# Patient Record
Sex: FEMALE | Race: WHITE | Employment: OTHER | URBAN - METROPOLITAN AREA
[De-identification: names, ages, dates, MRNs, and addresses within clinical notes are randomized per-mention and may not be internally consistent; named-entity substitution may affect disease eponyms.]

---

## 2018-03-28 ENCOUNTER — OFFICE VISIT (OUTPATIENT)
Dept: VASCULAR SURGERY | Age: 83
End: 2018-03-28
Payer: MEDICARE

## 2018-03-28 VITALS — SYSTOLIC BLOOD PRESSURE: 122 MMHG | HEART RATE: 76 BPM | DIASTOLIC BLOOD PRESSURE: 76 MMHG

## 2018-03-28 DIAGNOSIS — I83.93 VARICOSE VEINS OF BOTH LOWER EXTREMITIES: Chronic | ICD-10-CM

## 2018-03-28 DIAGNOSIS — I87.2 VENOUS INSUFFICIENCY OF BOTH LOWER EXTREMITIES: Chronic | ICD-10-CM

## 2018-03-28 PROCEDURE — 99213 OFFICE O/P EST LOW 20 MIN: CPT | Performed by: SURGERY

## 2018-04-16 PROBLEM — I83.93 VARICOSE VEINS OF BOTH LOWER EXTREMITIES: Chronic | Status: ACTIVE | Noted: 2018-04-16

## 2018-06-13 PROBLEM — I83.93 VARICOSE VEINS OF BOTH LOWER EXTREMITIES: Chronic | Status: ACTIVE | Noted: 2018-06-13

## 2018-06-13 PROBLEM — I87.2 VENOUS INSUFFICIENCY OF BOTH LOWER EXTREMITIES: Chronic | Status: ACTIVE | Noted: 2018-06-13

## 2018-06-21 ENCOUNTER — OFFICE VISIT (OUTPATIENT)
Dept: CARDIOLOGY CLINIC | Age: 83
End: 2018-06-21
Payer: MEDICARE

## 2018-06-21 VITALS
RESPIRATION RATE: 16 BRPM | WEIGHT: 152.2 LBS | DIASTOLIC BLOOD PRESSURE: 58 MMHG | BODY MASS INDEX: 25.36 KG/M2 | HEIGHT: 65 IN | SYSTOLIC BLOOD PRESSURE: 134 MMHG | HEART RATE: 63 BPM

## 2018-06-21 DIAGNOSIS — R00.2 PALPITATION: Primary | ICD-10-CM

## 2018-06-21 PROCEDURE — 93000 ELECTROCARDIOGRAM COMPLETE: CPT | Performed by: INTERNAL MEDICINE

## 2018-06-21 PROCEDURE — 99213 OFFICE O/P EST LOW 20 MIN: CPT | Performed by: INTERNAL MEDICINE

## 2018-06-21 RX ORDER — METOPROLOL SUCCINATE 50 MG/1
50 TABLET, EXTENDED RELEASE ORAL 2 TIMES DAILY
Qty: 180 TABLET | Refills: 3 | Status: SHIPPED | OUTPATIENT
Start: 2018-06-21 | End: 2019-05-28 | Stop reason: SDUPTHER

## 2018-06-21 RX ORDER — ACETAMINOPHEN AND CODEINE PHOSPHATE 300; 30 MG/1; MG/1
TABLET ORAL
COMMUNITY
Start: 2018-06-01 | End: 2018-07-31

## 2018-07-03 ENCOUNTER — TELEPHONE (OUTPATIENT)
Dept: VASCULAR SURGERY | Age: 83
End: 2018-07-03

## 2018-07-31 NOTE — PROGRESS NOTES
Wendy 38 PRE-ADMISSION TESTING GENERAL INSTRUCTIONS- [facility]-phone number:[phone]    GENERAL INSTRUCTIONS  [x] Antibacterial Soap shower Night before and/or AM of Surgery  [] Rei wipe instruction sheet and wipes given. [x] Nothing by mouth after midnight, including gum, candy, mints, or water. [x] You may brush your teeth, gargle, but do NOT swallow water. []Hibiclens shower  the night before and the morning of surgery. Do not use             Hibiclens on your face or head. [x]No smoking, chewing tobacco, illegal drugs, or alcohol within 24 hours of your surgery. [x] Jewelry, valuables or body piercing's should not be brought to the hospital. All body and/or tongue piercing's must be removed prior to arriving to hospital.  ALL hair pins must be removed. [x] Do not wear makeup, lotions, powders, deodorant. Nail polish as directed by the nurse. [x] Arrange transportation to and from the hospital.  Arrange for someone to be with you for the remainder of the day and for 24 hours after your procedure due to having had anesthesia. [x] Bring insurance card and photo ID.  [] Transfusion Bracelet: Please bring with you to hospital, day of surgery  [] Bring urine specimen day of surgery. Any small container is acceptable. [] Use inhalers the morning of surgery and bring with you to hospital.   []Bring copy of living will or healthcare power of  papers to be placed in your electronic record. [] CPAP/BI-PAP: Please bring your machine if you are to spend the night in the hospital.     ENDOSCOPY INSTRUCTIONS:   [] Bowel prep instructions reviewed. [] Nothing by mouth after midnight, including gum, candy, mints, or water.  [] You may brush your teeth, gargle, but do NOT swallow water. [] Do not wear makeup, lotions, powders, deodorant. Nail polish as directed by the nurse.   [] Arrange transportation to and from the hospital.  Arrange for someone to be with you for the

## 2018-08-10 ENCOUNTER — ANESTHESIA (OUTPATIENT)
Dept: OPERATING ROOM | Age: 83
End: 2018-08-10
Payer: MEDICARE

## 2018-08-10 ENCOUNTER — ANESTHESIA EVENT (OUTPATIENT)
Dept: OPERATING ROOM | Age: 83
End: 2018-08-10
Payer: MEDICARE

## 2018-08-10 ENCOUNTER — HOSPITAL ENCOUNTER (OUTPATIENT)
Age: 83
Setting detail: OUTPATIENT SURGERY
Discharge: HOME | End: 2018-08-10
Attending: SURGERY | Admitting: SURGERY
Payer: MEDICARE

## 2018-08-10 VITALS
RESPIRATION RATE: 16 BRPM | BODY MASS INDEX: 26.66 KG/M2 | TEMPERATURE: 98.1 F | SYSTOLIC BLOOD PRESSURE: 170 MMHG | HEART RATE: 65 BPM | OXYGEN SATURATION: 98 % | WEIGHT: 160 LBS | DIASTOLIC BLOOD PRESSURE: 80 MMHG | HEIGHT: 65 IN

## 2018-08-10 VITALS — SYSTOLIC BLOOD PRESSURE: 111 MMHG | OXYGEN SATURATION: 96 % | DIASTOLIC BLOOD PRESSURE: 55 MMHG

## 2018-08-10 DIAGNOSIS — G89.18 POST-OP PAIN: ICD-10-CM

## 2018-08-10 DIAGNOSIS — I83.93 VARICOSE VEINS OF BOTH LOWER EXTREMITIES: Primary | Chronic | ICD-10-CM

## 2018-08-10 LAB
ANION GAP SERPL CALCULATED.3IONS-SCNC: 11 MMOL/L (ref 7–16)
BUN BLDV-MCNC: 19 MG/DL (ref 8–23)
CALCIUM SERPL-MCNC: 9.4 MG/DL (ref 8.6–10.2)
CHLORIDE BLD-SCNC: 99 MMOL/L (ref 98–107)
CO2: 25 MMOL/L (ref 22–29)
CREAT SERPL-MCNC: 0.8 MG/DL (ref 0.5–1)
GFR AFRICAN AMERICAN: >60
GFR NON-AFRICAN AMERICAN: >60 ML/MIN/1.73
GLUCOSE BLD-MCNC: 119 MG/DL (ref 74–109)
HCT VFR BLD CALC: 43.8 % (ref 34–48)
HEMOGLOBIN: 15.3 G/DL (ref 11.5–15.5)
INR BLD: 1
MCH RBC QN AUTO: 31 PG (ref 26–35)
MCHC RBC AUTO-ENTMCNC: 34.9 % (ref 32–34.5)
MCV RBC AUTO: 88.7 FL (ref 80–99.9)
PDW BLD-RTO: 13.7 FL (ref 11.5–15)
PLATELET # BLD: 219 E9/L (ref 130–450)
PMV BLD AUTO: 10.3 FL (ref 7–12)
POTASSIUM REFLEX MAGNESIUM: 4.2 MMOL/L (ref 3.5–5)
PROTHROMBIN TIME: 11.4 SEC (ref 9.3–12.4)
RBC # BLD: 4.94 E12/L (ref 3.5–5.5)
SODIUM BLD-SCNC: 135 MMOL/L (ref 132–146)
WBC # BLD: 10 E9/L (ref 4.5–11.5)

## 2018-08-10 PROCEDURE — 6360000002 HC RX W HCPCS

## 2018-08-10 PROCEDURE — 3700000001 HC ADD 15 MINUTES (ANESTHESIA): Performed by: SURGERY

## 2018-08-10 PROCEDURE — 3700000000 HC ANESTHESIA ATTENDED CARE: Performed by: SURGERY

## 2018-08-10 PROCEDURE — 2709999900 HC NON-CHARGEABLE SUPPLY: Performed by: SURGERY

## 2018-08-10 PROCEDURE — 6360000002 HC RX W HCPCS: Performed by: SURGERY

## 2018-08-10 PROCEDURE — 7100000011 HC PHASE II RECOVERY - ADDTL 15 MIN: Performed by: SURGERY

## 2018-08-10 PROCEDURE — 2580000003 HC RX 258: Performed by: SURGERY

## 2018-08-10 PROCEDURE — C1894 INTRO/SHEATH, NON-LASER: HCPCS | Performed by: SURGERY

## 2018-08-10 PROCEDURE — 7100000010 HC PHASE II RECOVERY - FIRST 15 MIN: Performed by: SURGERY

## 2018-08-10 PROCEDURE — 3600000012 HC SURGERY LEVEL 2 ADDTL 15MIN: Performed by: SURGERY

## 2018-08-10 PROCEDURE — 2580000003 HC RX 258

## 2018-08-10 PROCEDURE — 80048 BASIC METABOLIC PNL TOTAL CA: CPT

## 2018-08-10 PROCEDURE — 85610 PROTHROMBIN TIME: CPT

## 2018-08-10 PROCEDURE — 88304 TISSUE EXAM BY PATHOLOGIST: CPT

## 2018-08-10 PROCEDURE — 37765 STAB PHLEB VEINS XTR 10-20: CPT | Performed by: SURGERY

## 2018-08-10 PROCEDURE — 85027 COMPLETE CBC AUTOMATED: CPT

## 2018-08-10 PROCEDURE — 2500000003 HC RX 250 WO HCPCS

## 2018-08-10 PROCEDURE — 3600000002 HC SURGERY LEVEL 2 BASE: Performed by: SURGERY

## 2018-08-10 PROCEDURE — 36415 COLL VENOUS BLD VENIPUNCTURE: CPT

## 2018-08-10 RX ORDER — OXYCODONE HYDROCHLORIDE AND ACETAMINOPHEN 5; 325 MG/1; MG/1
1 TABLET ORAL EVERY 6 HOURS PRN
Qty: 12 TABLET | Refills: 0 | Status: SHIPPED | OUTPATIENT
Start: 2018-08-10 | End: 2018-08-15

## 2018-08-10 RX ORDER — SODIUM CHLORIDE 9 MG/ML
INJECTION, SOLUTION INTRAVENOUS CONTINUOUS PRN
Status: DISCONTINUED | OUTPATIENT
Start: 2018-08-10 | End: 2018-08-10 | Stop reason: SDUPTHER

## 2018-08-10 RX ORDER — PROPOFOL 10 MG/ML
INJECTION, EMULSION INTRAVENOUS PRN
Status: DISCONTINUED | OUTPATIENT
Start: 2018-08-10 | End: 2018-08-10 | Stop reason: SDUPTHER

## 2018-08-10 RX ORDER — SODIUM CHLORIDE 0.9 % (FLUSH) 0.9 %
10 SYRINGE (ML) INJECTION EVERY 12 HOURS SCHEDULED
Status: DISCONTINUED | OUTPATIENT
Start: 2018-08-10 | End: 2018-08-10 | Stop reason: HOSPADM

## 2018-08-10 RX ORDER — PROPOFOL 10 MG/ML
INJECTION, EMULSION INTRAVENOUS CONTINUOUS PRN
Status: DISCONTINUED | OUTPATIENT
Start: 2018-08-10 | End: 2018-08-10 | Stop reason: SDUPTHER

## 2018-08-10 RX ORDER — FENTANYL CITRATE 50 UG/ML
INJECTION, SOLUTION INTRAMUSCULAR; INTRAVENOUS PRN
Status: DISCONTINUED | OUTPATIENT
Start: 2018-08-10 | End: 2018-08-10 | Stop reason: SDUPTHER

## 2018-08-10 RX ORDER — MIDAZOLAM HYDROCHLORIDE 1 MG/ML
INJECTION INTRAMUSCULAR; INTRAVENOUS PRN
Status: DISCONTINUED | OUTPATIENT
Start: 2018-08-10 | End: 2018-08-10 | Stop reason: SDUPTHER

## 2018-08-10 RX ORDER — SODIUM CHLORIDE 0.9 % (FLUSH) 0.9 %
10 SYRINGE (ML) INJECTION PRN
Status: DISCONTINUED | OUTPATIENT
Start: 2018-08-10 | End: 2018-08-10 | Stop reason: HOSPADM

## 2018-08-10 RX ADMIN — MIDAZOLAM HYDROCHLORIDE 1 MG: 1 INJECTION, SOLUTION INTRAMUSCULAR; INTRAVENOUS at 07:32

## 2018-08-10 RX ADMIN — PROPOFOL 30 MG: 10 INJECTION, EMULSION INTRAVENOUS at 07:50

## 2018-08-10 RX ADMIN — Medication 2 G: at 07:47

## 2018-08-10 RX ADMIN — PROPOFOL 40 MG: 10 INJECTION, EMULSION INTRAVENOUS at 07:35

## 2018-08-10 RX ADMIN — SODIUM CHLORIDE: 9 INJECTION, SOLUTION INTRAVENOUS at 07:32

## 2018-08-10 RX ADMIN — FENTANYL CITRATE 50 MCG: 50 INJECTION, SOLUTION INTRAMUSCULAR; INTRAVENOUS at 07:40

## 2018-08-10 RX ADMIN — PROPOFOL 50 MCG/KG/MIN: 10 INJECTION, EMULSION INTRAVENOUS at 07:30

## 2018-08-10 RX ADMIN — FENTANYL CITRATE 25 MCG: 50 INJECTION, SOLUTION INTRAMUSCULAR; INTRAVENOUS at 07:55

## 2018-08-10 ASSESSMENT — PULMONARY FUNCTION TESTS
PIF_VALUE: 0
PIF_VALUE: 1
PIF_VALUE: 1
PIF_VALUE: 0
PIF_VALUE: 1
PIF_VALUE: 0
PIF_VALUE: 1
PIF_VALUE: 0

## 2018-08-10 ASSESSMENT — PAIN - FUNCTIONAL ASSESSMENT: PAIN_FUNCTIONAL_ASSESSMENT: 0-10

## 2018-08-10 NOTE — ANESTHESIA PRE PROCEDURE
Department of Anesthesiology  Preprocedure Note       Name:  Sharran Severs   Age:  82 y.o.  :  1935                                          MRN:  [id number]         Date:  8/10/2018      Surgeon: Katina Walls):  Jenn Hu MD    Procedure: Procedure(s):  RIGHT GREASTER SAPHENOUS VEIN ABLATION RADIOFREQUENCY  WITH STAB PHLEBECTOMIES    Medications prior to admission:   Prior to Admission medications    Medication Sig Start Date End Date Taking? Authorizing Provider   metoprolol succinate (TOPROL XL) 50 MG extended release tablet Take 1 tablet by mouth 2 times daily 18  Yes Jerry Bee MD   calcitonin (MIACALCIN) 200 UNIT/ACT nasal spray 1 spray by Nasal route daily  16  Yes Historical Provider, MD   levothyroxine (SYNTHROID) 75 MCG tablet Take 75 mcg by mouth Daily    Yes Historical Provider, MD   amitriptyline (ELAVIL) 50 MG tablet Take 25 mg by mouth nightly    Yes Historical Provider, MD   Calcium-Magnesium-Vitamin D (CITRACAL CALCIUM+D PO) Take 500 mg by mouth 2 times daily. Historical Provider, MD   vitamin D (CHOLECALCIFEROL) 1000 UNIT TABS tablet Take 1,000 Int'l Units by mouth daily Takes two tabs daily    Historical Provider, MD       Current medications:    Current Facility-Administered Medications   Medication Dose Route Frequency Provider Last Rate Last Dose    sodium chloride flush 0.9 % injection 10 mL  10 mL Intravenous 2 times per day Jenn Hu MD        sodium chloride flush 0.9 % injection 10 mL  10 mL Intravenous PRN Jenn Hu MD        ceFAZolin (ANCEF) 2 g in dextrose 5 % 50 mL IVPB  2 g Intravenous On Call to [address], MD           Allergies:     Allergies   Allergen Reactions    Iv Dye [Iodides] Hives    Pcn [Penicillins] Rash    Aspirin Other (See Comments)     tingling    Bactrim Other (See Comments)     Yeast infection    Drixoral [Dexbrompheniramine-Pseudoeph] Other (See Comments)     hyper    Morphine Other (See 3 Encounters:   08/10/18 160 lb (72.6 kg)   06/21/18 152 lb 3.2 oz (69 kg)   06/08/17 171 lb 12.8 oz (77.9 kg)     Body mass index is 26.63 kg/m². CBC:   Lab Results   Component Value Date    WBC 10.0 08/10/2018    RBC 4.94 08/10/2018    HGB 15.3 08/10/2018    HCT 43.8 08/10/2018    MCV 88.7 08/10/2018    RDW 13.7 08/10/2018     08/10/2018       CMP:   Lab Results   Component Value Date     08/10/2018    K 4.2 08/10/2018    CL 99 08/10/2018    CO2 25 08/10/2018    BUN 19 08/10/2018    CREATININE 0.8 08/10/2018    GFRAA >60 08/10/2018    LABGLOM >60 08/10/2018    GLUCOSE 119 08/10/2018    GLUCOSE 82 05/15/2012    PROT 6.1 05/15/2012    CALCIUM 9.4 08/10/2018    BILITOT 0.4 05/15/2012    ALKPHOS 97 05/15/2012    AST 19 05/15/2012    ALT 14 05/15/2012       POC Tests: No results for input(s): POCGLU, POCNA, POCK, POCCL, POCBUN, POCHEMO, POCHCT in the last 72 hours. Coags:   Lab Results   Component Value Date    PROTIME 11.4 08/10/2018    INR 1.0 08/10/2018    APTT 32.3 02/07/2014       HCG (If Applicable): No results found for: PREGTESTUR, PREGSERUM, HCG, HCGQUANT     ABGs: No results found for: PHART, PO2ART, NMT8NKO, DKD0STA, BEART, P8RTJTXB     Type & Screen (If Applicable):  No results found for: LABABO, [address]    Anesthesia Evaluation  Patient summary reviewed   history of anesthetic complications: difficult airway.   Airway: Mallampati: II  TM distance: >3 FB   Neck ROM: full  Mouth opening: > = 3 FB Dental: normal exam         Pulmonary:Negative Pulmonary ROS breath sounds clear to auscultation                             Cardiovascular:    (+) hypertension:,         Rhythm: regular  Rate: normal                    Neuro/Psych:   (+) headaches:,             GI/Hepatic/Renal: Neg GI/Hepatic/Renal ROS            Endo/Other:    (+) hypothyroidism::., .                 Abdominal:           Vascular:                                        Anesthesia Plan      MAC     ASA 3       Induction:

## 2018-08-10 NOTE — PROGRESS NOTES
Pt admitted to same day surgery. Pt alert/oriented x3. Respirations non-labored. Skin warm/dry. No distress noted. Side rails x2. Call light in reach. Will continue to monitor.   David Gonzalez RN

## 2018-08-10 NOTE — H&P
extraocular eye movements intact, conjunctivae normal  ENT: external ear and ear canal normal bilaterally, nose without deformity  Pulmonary/Chest: clear to auscultation bilaterally- no wheezes, rales or rhonchi, normal air movement, no respiratory distress  Cardiovascular: normal rate, regular rhythm, normal S1 and S2, no murmurs, no carotid bruits  Abdomen: soft, non-tender, non-distended, normal bowel sounds, no masses or organomegaly  Musculoskeletal: normal range of motion, no joint swelling, deformity or tenderness  Neurologic: no cranial nerve deficit, gait, coordination and speech normal  Extremities: Positive bilateral lower extremity varicose veins. She has a palpable old thrombus there is no signs of superficial cellulitis or significant pain or discomfort on palpation.     PULSE EXAM      Right      Left   Brachial 3 3   Radial 3 3   Femoral       Popliteal 3 3   Dorsalis Pedis 2 2   Posterior Tibial 2 2   (3=normal, 2=diminished, 1=barely palpable, 4=widened)     RADIOLOGY:          Problem List Items Addressed This Visit      Venous since (Chronic)     Venous insufficiency of both lower extremities (Chronic)             #1 bilateral lower extremity saphenofemoral junction reflux. At this point she has a thrombosed superficial varicose vein in the mid calf on the right side. She has no evidence of deep venous thrombosis. Both legs cause her some pain and discomfort of which she has responded nicely to compression therapy. At this point I think secondary to her varicose vein/thrombus of the varicose vein and the reflux of the saphenofemoral junction I think it is reasonable to proceed with radiofrequency ablation and phlebectomy if needed.     We will proceed. I discussed this case with the patient at length.  Risk benefits and alternatives including bleeding, infection, arterial venous nerve injury, DVT, pulmonary embolus, recurrence, pain or swelling, some sensory or motor disturbance, limb loss and her

## 2018-08-10 NOTE — PROGRESS NOTES
tumescent custom solution of 50 ml of lidocaine 1% with epi  In 500 ml og 0.9% saline given to field none used and was wasted

## 2018-08-10 NOTE — PROGRESS NOTES
Pt admitted to secondary recovery. Pt alert/oriented x3. Respirations non-labored. Skin warm/dry. No distress noted. Side rails x2. Call light in reach. Will continue to monitor.   Ilene Law, RN

## 2018-08-15 ENCOUNTER — HOSPITAL ENCOUNTER (OUTPATIENT)
Dept: CARDIOLOGY | Age: 83
Discharge: HOME | End: 2018-08-15
Payer: MEDICARE

## 2018-08-15 ENCOUNTER — OFFICE VISIT (OUTPATIENT)
Dept: VASCULAR SURGERY | Age: 83
End: 2018-08-15

## 2018-08-15 DIAGNOSIS — I87.2 VENOUS INSUFFICIENCY OF BOTH LOWER EXTREMITIES: Primary | ICD-10-CM

## 2018-08-15 PROCEDURE — 99024 POSTOP FOLLOW-UP VISIT: CPT | Performed by: NURSE PRACTITIONER

## 2018-08-15 PROCEDURE — 93971 EXTREMITY STUDY: CPT

## 2018-08-15 NOTE — PROGRESS NOTES
[facility] Heart [facility] Vascular Lab - [facility]    This is a pre read worksheet - prior to official physician interpretationFrances Janee Pitt    1935  Date of study: 8/15/18    Indication for study:  Leg pain and swelling, varicose veins  Study : Right Lower Extremity Venous Duplex Examination    Duplex examination of the common, deep, superficial femoral, and the popliteal veins of the RIGHT lower extremity identifies spontaneous flow. All scanned veins are compressible and free of echogenic densities. Additional comments: negative DVT. Flow visualized in gsv ( 0.4 cm x 0.4 cm) to mid- thigh.

## 2018-09-28 ENCOUNTER — TELEPHONE (OUTPATIENT)
Dept: VASCULAR SURGERY | Age: 83
End: 2018-09-28

## 2018-09-28 NOTE — TELEPHONE ENCOUNTER
Patient instructed to continue wearing compression stockings, as sclerotherapy not covered under her insurance for the left leg.

## 2019-05-28 RX ORDER — METOPROLOL SUCCINATE 50 MG/1
50 TABLET, EXTENDED RELEASE ORAL 2 TIMES DAILY
Qty: 180 TABLET | Refills: 3 | Status: SHIPPED
Start: 2019-05-28 | End: 2020-04-06 | Stop reason: SDUPTHER

## 2019-05-28 NOTE — TELEPHONE ENCOUNTER
Patient called and needs a refill on metoprolol succinate (TOPROL XL) 50 MG extended release tablet sent to [facility].

## 2019-07-03 ENCOUNTER — OFFICE VISIT (OUTPATIENT)
Dept: CARDIOLOGY CLINIC | Age: 84
End: 2019-07-03
Payer: MEDICARE

## 2019-07-03 VITALS
BODY MASS INDEX: 27.64 KG/M2 | WEIGHT: 165.9 LBS | RESPIRATION RATE: 16 BRPM | HEART RATE: 65 BPM | HEIGHT: 65 IN | DIASTOLIC BLOOD PRESSURE: 72 MMHG | SYSTOLIC BLOOD PRESSURE: 120 MMHG

## 2019-07-03 DIAGNOSIS — R00.2 PALPITATION: Primary | ICD-10-CM

## 2019-07-03 PROCEDURE — 99213 OFFICE O/P EST LOW 20 MIN: CPT | Performed by: INTERNAL MEDICINE

## 2019-07-03 PROCEDURE — 93000 ELECTROCARDIOGRAM COMPLETE: CPT | Performed by: INTERNAL MEDICINE

## 2019-07-03 NOTE — PROGRESS NOTES
OUTPATIENT CARDIOLOGY FOLLOW-UP    Name: Augustine Cisneros    Age: 82 y.o. Primary Care Physician: Myesha Charles MD    Date of Service: 7/3/2019    Chief Complaint:   Chief Complaint   Patient presents with    Palpitations    Chest Pain    1 Year Follow Up       Interim History:   Mrs. Génesis Chairez is a 45-year-old female history of TIA, symptomatic palpitations, migraines, mild hyperlipidemia, hypothyroidism, history of breast cancer status post the mastectomy and reconstructive surgery is here for follow-up visit. She was seen in the office on 6/21/2018. Since her last visit, she has not had any ER visits or hospitalizations for cardiac related problems. However, she was seen in the emergency room on 8/10/2018 secondary to left leg swelling. She still gets occasional palpitations or skipped heartbeats but well controlled on Toprol-XL. She told me that she had 2 episodes of fluttering in her chest that lasted about few seconds in the month of May since then no other episodes. She did not notice any dizziness, lightheadedness, syncope or presyncope or any chest pain or dyspnea with those episodes of fluttering. Her last lipid profile done on 4/30/2019 showed total cholesterol of 164 LDL 88 triglycerides 74 and HDL 61. No new cardiac complaints since last cardiology evaluation. She denies recent chest pain, SOB, palpitations, lightheadedness, dizziness, syncope, PND, or orthopnea. SR on EKG.     Review of Systems:   Cardiac: As per HPI  General: No fever, chills  Pulmonary: As per HPI  HEENT: No visual disturbances, difficult swallowing  GI: No nausea, vomiting  Endocrine: No thyroid disease or DM  Musculoskeletal: GRANDE x 4, no focal motor deficits  Skin: Intact, no rashes  Neuro/Psych: No headache or seizures    Past Medical History:  Past Medical History:   Diagnosis Date    Arthritis     l knee injections with dr Iram Frazier    Chest pain     resolved    Difficult intubation 1-    document on chart    Migraine headache     Osteopenia     Palpitation     resolved    Plantar fasciitis     Skipped heart beats     Thyroid disease     hypothyroidism       Past Surgical History:  Past Surgical History:   Procedure Laterality Date    BREAST RECONSTRUCTION Bilateral 2004, 2007    COLONOSCOPY      DILATION AND CURETTAGE OF UTERUS      EYE SURGERY Bilateral     cataract removal    INCONTINENCE SURGERY      MASTECTOMY Bilateral 1982    for fibrocystic breast disease; has saline implants    SC ENDOVENOUS RF, 1ST VEIN Right 8/10/2018    RIGHT GREASTER SAPHENOUS VEIN STAB PHLEBECTOMIES performed by Laura Díaz MD at [facility] 22 TONSILLECTOMY         Family History:  Family History   Problem Relation Age of Onset    Heart Disease Father        Social History:  Social History     Socioeconomic History    Marital status:      Spouse name: Not on file    Number of children: Not on file    Years of education: Not on file    Highest education level: Not on file   Occupational History    Not on file   Social Needs    Financial resource strain: Not on file    Food insecurity:     Worry: Not on file     Inability: Not on file    Transportation needs:     Medical: Not on file     Non-medical: Not on file   Tobacco Use    Smoking status: Never Smoker    Smokeless tobacco: Never Used   Substance and Sexual Activity    Alcohol use: No    Drug use: No    Sexual activity: Not on file   Lifestyle    Physical activity:     Days per week: Not on file     Minutes per session: Not on file    Stress: Not on file   Relationships    Social connections:     Talks on phone: Not on file     Gets together: Not on file     Attends Jew service: Not on file     Active member of club or organization: Not on file     Attends meetings of clubs or organizations: Not on file     Relationship status: Not on file    Intimate partner violence:     Fear of current or ex partner: Not on file     Emotionally sounds  Extremities: Moves all extremities x 4, no lower extremity edema  Neurologic: No focal motor deficits apparent, normal mood and affect, alert and oriented x 3  Peripheral Pulses: Intact posterior tibial pulses bilaterally    Laboratory Tests:  Lab Results   Component Value Date    CREATININE 0.8 08/10/2018    BUN 19 08/10/2018     08/10/2018    K 4.2 08/10/2018    CL 99 08/10/2018    CO2 25 08/10/2018     No results found for: MG  Lab Results   Component Value Date    WBC 10.0 08/10/2018    HGB 15.3 08/10/2018    HCT 43.8 08/10/2018    MCV 88.7 08/10/2018     08/10/2018     Lab Results   Component Value Date    ALT 14 05/15/2012    AST 19 05/15/2012    ALKPHOS 97 05/15/2012    BILITOT 0.4 05/15/2012     Lab Results   Component Value Date    CKTOTAL 21 02/08/2014    CKMB 1.3 02/08/2014    TROPONINI <0.01 02/08/2014    TROPONINI <0.01 02/08/2014    TROPONINI <0.01 02/07/2014     Lab Results   Component Value Date    INR 1.0 08/10/2018    INR 1.0 02/07/2014    PROTIME 11.4 08/10/2018    PROTIME 11.8 02/07/2014     Lab Results   Component Value Date    TSH 0.662 05/15/2012     No results found for: LABA1C  No results found for: EAG  Lab Results   Component Value Date    CHOL 162 05/15/2012    CHOL 185 05/03/2011     Lab Results   Component Value Date    TRIG 111 05/15/2012    TRIG 139 05/03/2011     Lab Results   Component Value Date    HDL 46.0 05/15/2012    HDL 50.0 05/03/2011     Lab Results   Component Value Date    LDLCALC 94 05/15/2012    LDLCALC 107 (H) 05/03/2011     No results found for: LABVLDL, VLDL  No results found for: CHOLHDLRATIO    Cardiac Tests:  ECG: normal sinus rhythm, nonspecific T-wave changes otherwise normal EKG. Echocardiogram: 8/15/2011-LVEF 15%, stage I diastolic dysfunction, mild TR. Stress test:  2/8/2014-no reversible ischemia, no defect or infarction. LVEF 77%.       The ASCVD Risk score (Liset Stuart, et al., 2013) failed to calculate for the following reasons:

## 2020-02-19 ENCOUNTER — HOSPITAL ENCOUNTER (OUTPATIENT)
Age: 85
Setting detail: OBSERVATION
Discharge: HOME | End: 2020-02-20
Attending: EMERGENCY MEDICINE | Admitting: INTERNAL MEDICINE
Payer: MEDICARE

## 2020-02-19 ENCOUNTER — APPOINTMENT (OUTPATIENT)
Dept: CT IMAGING | Age: 85
End: 2020-02-19
Payer: MEDICARE

## 2020-02-19 LAB
ANION GAP SERPL CALCULATED.3IONS-SCNC: 11 MMOL/L (ref 7–16)
BASOPHILS ABSOLUTE: 0.03 E9/L (ref 0–0.2)
BASOPHILS RELATIVE PERCENT: 0.4 % (ref 0–2)
BUN BLDV-MCNC: 18 MG/DL (ref 8–23)
CALCIUM SERPL-MCNC: 9.2 MG/DL (ref 8.6–10.2)
CHLORIDE BLD-SCNC: 101 MMOL/L (ref 98–107)
CK MB: 1.4 NG/ML (ref 0–4.3)
CO2: 25 MMOL/L (ref 22–29)
CREAT SERPL-MCNC: 1 MG/DL (ref 0.5–1)
EOSINOPHILS ABSOLUTE: 0.17 E9/L (ref 0.05–0.5)
EOSINOPHILS RELATIVE PERCENT: 2 % (ref 0–6)
GFR AFRICAN AMERICAN: >60
GFR NON-AFRICAN AMERICAN: 53 ML/MIN/1.73
GLUCOSE BLD-MCNC: 140 MG/DL (ref 74–99)
HCT VFR BLD CALC: 40.8 % (ref 34–48)
HEMOGLOBIN: 13.4 G/DL (ref 11.5–15.5)
IMMATURE GRANULOCYTES #: 0.04 E9/L
IMMATURE GRANULOCYTES %: 0.5 % (ref 0–5)
LIPASE: 39 U/L (ref 13–60)
LYMPHOCYTES ABSOLUTE: 2.31 E9/L (ref 1.5–4)
LYMPHOCYTES RELATIVE PERCENT: 27.7 % (ref 20–42)
MCH RBC QN AUTO: 30.5 PG (ref 26–35)
MCHC RBC AUTO-ENTMCNC: 32.8 % (ref 32–34.5)
MCV RBC AUTO: 92.9 FL (ref 80–99.9)
MONOCYTES ABSOLUTE: 0.95 E9/L (ref 0.1–0.95)
MONOCYTES RELATIVE PERCENT: 11.4 % (ref 2–12)
NEUTROPHILS ABSOLUTE: 4.85 E9/L (ref 1.8–7.3)
NEUTROPHILS RELATIVE PERCENT: 58 % (ref 43–80)
PDW BLD-RTO: 13.5 FL (ref 11.5–15)
PLATELET # BLD: 227 E9/L (ref 130–450)
PMV BLD AUTO: 10.2 FL (ref 7–12)
POTASSIUM REFLEX MAGNESIUM: 3.6 MMOL/L (ref 3.5–5)
RBC # BLD: 4.39 E12/L (ref 3.5–5.5)
SODIUM BLD-SCNC: 137 MMOL/L (ref 132–146)
TOTAL CK: 24 U/L (ref 20–180)
TROPONIN: <0.01 NG/ML (ref 0–0.03)
WBC # BLD: 8.4 E9/L (ref 4.5–11.5)

## 2020-02-19 PROCEDURE — 96375 TX/PRO/DX INJ NEW DRUG ADDON: CPT

## 2020-02-19 PROCEDURE — 85025 COMPLETE CBC W/AUTO DIFF WBC: CPT

## 2020-02-19 PROCEDURE — 99285 EMERGENCY DEPT VISIT HI MDM: CPT

## 2020-02-19 PROCEDURE — 82550 ASSAY OF CK (CPK): CPT

## 2020-02-19 PROCEDURE — 82553 CREATINE MB FRACTION: CPT

## 2020-02-19 PROCEDURE — 84484 ASSAY OF TROPONIN QUANT: CPT

## 2020-02-19 PROCEDURE — 36415 COLL VENOUS BLD VENIPUNCTURE: CPT

## 2020-02-19 PROCEDURE — 80048 BASIC METABOLIC PNL TOTAL CA: CPT

## 2020-02-19 PROCEDURE — 71275 CT ANGIOGRAPHY CHEST: CPT

## 2020-02-19 PROCEDURE — 2580000003 HC RX 258: Performed by: EMERGENCY MEDICINE

## 2020-02-19 PROCEDURE — 93005 ELECTROCARDIOGRAM TRACING: CPT | Performed by: EMERGENCY MEDICINE

## 2020-02-19 PROCEDURE — 83690 ASSAY OF LIPASE: CPT

## 2020-02-19 PROCEDURE — 6360000002 HC RX W HCPCS: Performed by: EMERGENCY MEDICINE

## 2020-02-19 PROCEDURE — 2580000003 HC RX 258: Performed by: RADIOLOGY

## 2020-02-19 PROCEDURE — 96374 THER/PROPH/DIAG INJ IV PUSH: CPT

## 2020-02-19 PROCEDURE — 6360000004 HC RX CONTRAST MEDICATION: Performed by: RADIOLOGY

## 2020-02-19 RX ORDER — DIPHENHYDRAMINE HYDROCHLORIDE 50 MG/ML
50 INJECTION INTRAMUSCULAR; INTRAVENOUS ONCE
Status: COMPLETED | OUTPATIENT
Start: 2020-02-19 | End: 2020-02-19

## 2020-02-19 RX ORDER — METHYLPREDNISOLONE SODIUM SUCCINATE 125 MG/2ML
125 INJECTION, POWDER, LYOPHILIZED, FOR SOLUTION INTRAMUSCULAR; INTRAVENOUS ONCE
Status: COMPLETED | OUTPATIENT
Start: 2020-02-19 | End: 2020-02-19

## 2020-02-19 RX ORDER — SODIUM CHLORIDE 0.9 % (FLUSH) 0.9 %
10 SYRINGE (ML) INJECTION 2 TIMES DAILY
Status: DISCONTINUED | OUTPATIENT
Start: 2020-02-19 | End: 2020-02-20 | Stop reason: HOSPADM

## 2020-02-19 RX ORDER — KETOROLAC TROMETHAMINE 30 MG/ML
15 INJECTION, SOLUTION INTRAMUSCULAR; INTRAVENOUS ONCE
Status: COMPLETED | OUTPATIENT
Start: 2020-02-19 | End: 2020-02-19

## 2020-02-19 RX ORDER — 0.9 % SODIUM CHLORIDE 0.9 %
1000 INTRAVENOUS SOLUTION INTRAVENOUS ONCE
Status: COMPLETED | OUTPATIENT
Start: 2020-02-19 | End: 2020-02-19

## 2020-02-19 RX ADMIN — IOPAMIDOL 70 ML: 755 INJECTION, SOLUTION INTRAVENOUS at 23:44

## 2020-02-19 RX ADMIN — DIPHENHYDRAMINE HYDROCHLORIDE 50 MG: 50 INJECTION, SOLUTION INTRAMUSCULAR; INTRAVENOUS at 22:20

## 2020-02-19 RX ADMIN — SODIUM CHLORIDE 1000 ML: 9 INJECTION, SOLUTION INTRAVENOUS at 22:26

## 2020-02-19 RX ADMIN — METHYLPREDNISOLONE SODIUM SUCCINATE 125 MG: 125 INJECTION, POWDER, FOR SOLUTION INTRAMUSCULAR; INTRAVENOUS at 22:20

## 2020-02-19 RX ADMIN — KETOROLAC TROMETHAMINE 15 MG: 30 INJECTION, SOLUTION INTRAMUSCULAR; INTRAVENOUS at 22:20

## 2020-02-19 RX ADMIN — Medication 10 ML: at 23:38

## 2020-02-19 ASSESSMENT — PAIN DESCRIPTION - FREQUENCY: FREQUENCY: INTERMITTENT

## 2020-02-19 ASSESSMENT — PAIN DESCRIPTION - PROGRESSION: CLINICAL_PROGRESSION: GRADUALLY WORSENING

## 2020-02-19 ASSESSMENT — PAIN DESCRIPTION - LOCATION: LOCATION: BACK

## 2020-02-19 ASSESSMENT — PAIN DESCRIPTION - PAIN TYPE: TYPE: ACUTE PAIN

## 2020-02-19 ASSESSMENT — PAIN SCALES - GENERAL
PAINLEVEL_OUTOF10: 1
PAINLEVEL_OUTOF10: 5

## 2020-02-19 ASSESSMENT — PAIN DESCRIPTION - ONSET: ONSET: GRADUAL

## 2020-02-19 ASSESSMENT — PAIN DESCRIPTION - DESCRIPTORS: DESCRIPTORS: ACHING;TIGHTNESS

## 2020-02-19 ASSESSMENT — PAIN DESCRIPTION - ORIENTATION: ORIENTATION: UPPER

## 2020-02-20 ENCOUNTER — APPOINTMENT (OUTPATIENT)
Dept: ULTRASOUND IMAGING | Age: 85
End: 2020-02-20
Payer: MEDICARE

## 2020-02-20 ENCOUNTER — APPOINTMENT (OUTPATIENT)
Dept: NUCLEAR MEDICINE | Age: 85
End: 2020-02-20
Payer: MEDICARE

## 2020-02-20 ENCOUNTER — APPOINTMENT (OUTPATIENT)
Dept: NON INVASIVE DIAGNOSTICS | Age: 85
End: 2020-02-20
Payer: MEDICARE

## 2020-02-20 VITALS
BODY MASS INDEX: 27.32 KG/M2 | RESPIRATION RATE: 16 BRPM | TEMPERATURE: 98.7 F | HEIGHT: 66 IN | WEIGHT: 170 LBS | DIASTOLIC BLOOD PRESSURE: 84 MMHG | SYSTOLIC BLOOD PRESSURE: 186 MMHG | HEART RATE: 97 BPM | OXYGEN SATURATION: 99 %

## 2020-02-20 PROBLEM — I83.93 VARICOSE VEINS OF BOTH LOWER EXTREMITIES: Chronic | Status: RESOLVED | Noted: 2018-06-13 | Resolved: 2020-02-20

## 2020-02-20 LAB
CHOLESTEROL, TOTAL: 213 MG/DL (ref 0–199)
EKG ATRIAL RATE: 82 BPM
EKG P AXIS: 62 DEGREES
EKG P-R INTERVAL: 264 MS
EKG Q-T INTERVAL: 382 MS
EKG QRS DURATION: 88 MS
EKG QTC CALCULATION (BAZETT): 446 MS
EKG R AXIS: 14 DEGREES
EKG T AXIS: 70 DEGREES
EKG VENTRICULAR RATE: 82 BPM
HDLC SERPL-MCNC: 60 MG/DL
LDL CHOLESTEROL CALCULATED: 132 MG/DL (ref 0–99)
LV EF: 63 %
LV EF: 89 %
LVEF MODALITY: NORMAL
LVEF MODALITY: NORMAL
TRIGL SERPL-MCNC: 103 MG/DL (ref 0–149)
TROPONIN: <0.01 NG/ML (ref 0–0.03)
VLDLC SERPL CALC-MCNC: 21 MG/DL

## 2020-02-20 PROCEDURE — 93017 CV STRESS TEST TRACING ONLY: CPT

## 2020-02-20 PROCEDURE — 78452 HT MUSCLE IMAGE SPECT MULT: CPT

## 2020-02-20 PROCEDURE — A9500 TC99M SESTAMIBI: HCPCS | Performed by: RADIOLOGY

## 2020-02-20 PROCEDURE — 6360000002 HC RX W HCPCS: Performed by: INTERNAL MEDICINE

## 2020-02-20 PROCEDURE — 84484 ASSAY OF TROPONIN QUANT: CPT

## 2020-02-20 PROCEDURE — G0378 HOSPITAL OBSERVATION PER HR: HCPCS

## 2020-02-20 PROCEDURE — 99214 OFFICE O/P EST MOD 30 MIN: CPT | Performed by: INTERNAL MEDICINE

## 2020-02-20 PROCEDURE — 80061 LIPID PANEL: CPT

## 2020-02-20 PROCEDURE — 3430000000 HC RX DIAGNOSTIC RADIOPHARMACEUTICAL: Performed by: RADIOLOGY

## 2020-02-20 PROCEDURE — 93010 ELECTROCARDIOGRAM REPORT: CPT | Performed by: INTERNAL MEDICINE

## 2020-02-20 PROCEDURE — 6370000000 HC RX 637 (ALT 250 FOR IP): Performed by: INTERNAL MEDICINE

## 2020-02-20 PROCEDURE — 96372 THER/PROPH/DIAG INJ SC/IM: CPT

## 2020-02-20 PROCEDURE — 93018 CV STRESS TEST I&R ONLY: CPT | Performed by: INTERNAL MEDICINE

## 2020-02-20 PROCEDURE — 36415 COLL VENOUS BLD VENIPUNCTURE: CPT

## 2020-02-20 PROCEDURE — 2580000003 HC RX 258: Performed by: INTERNAL MEDICINE

## 2020-02-20 PROCEDURE — 93880 EXTRACRANIAL BILAT STUDY: CPT

## 2020-02-20 PROCEDURE — APPSS60 APP SPLIT SHARED TIME 46-60 MINUTES: Performed by: NURSE PRACTITIONER

## 2020-02-20 PROCEDURE — 93306 TTE W/DOPPLER COMPLETE: CPT

## 2020-02-20 PROCEDURE — 93016 CV STRESS TEST SUPVJ ONLY: CPT | Performed by: INTERNAL MEDICINE

## 2020-02-20 RX ORDER — ACETAMINOPHEN 325 MG/1
650 TABLET ORAL EVERY 4 HOURS PRN
Status: DISCONTINUED | OUTPATIENT
Start: 2020-02-20 | End: 2020-02-20 | Stop reason: HOSPADM

## 2020-02-20 RX ORDER — PROMETHAZINE HYDROCHLORIDE 25 MG/1
12.5 TABLET ORAL EVERY 6 HOURS PRN
Status: DISCONTINUED | OUTPATIENT
Start: 2020-02-20 | End: 2020-02-20 | Stop reason: HOSPADM

## 2020-02-20 RX ORDER — ATORVASTATIN CALCIUM 40 MG/1
40 TABLET, FILM COATED ORAL NIGHTLY
Status: DISCONTINUED | OUTPATIENT
Start: 2020-02-20 | End: 2020-02-20 | Stop reason: HOSPADM

## 2020-02-20 RX ORDER — CLOPIDOGREL BISULFATE 75 MG/1
75 TABLET ORAL ONCE
Status: COMPLETED | OUTPATIENT
Start: 2020-02-20 | End: 2020-02-20

## 2020-02-20 RX ORDER — CLOPIDOGREL BISULFATE 75 MG/1
75 TABLET ORAL DAILY
Status: DISCONTINUED | OUTPATIENT
Start: 2020-02-21 | End: 2020-02-20 | Stop reason: HOSPADM

## 2020-02-20 RX ORDER — SODIUM CHLORIDE 0.9 % (FLUSH) 0.9 %
10 SYRINGE (ML) INJECTION PRN
Status: DISCONTINUED | OUTPATIENT
Start: 2020-02-20 | End: 2020-02-20 | Stop reason: HOSPADM

## 2020-02-20 RX ORDER — ACETAMINOPHEN 650 MG/1
650 SUPPOSITORY RECTAL EVERY 6 HOURS PRN
Status: DISCONTINUED | OUTPATIENT
Start: 2020-02-20 | End: 2020-02-20 | Stop reason: HOSPADM

## 2020-02-20 RX ORDER — METOPROLOL SUCCINATE 50 MG/1
50 TABLET, EXTENDED RELEASE ORAL 2 TIMES DAILY
Status: DISCONTINUED | OUTPATIENT
Start: 2020-02-20 | End: 2020-02-20 | Stop reason: HOSPADM

## 2020-02-20 RX ORDER — ACETAMINOPHEN 325 MG/1
650 TABLET ORAL EVERY 6 HOURS PRN
Status: DISCONTINUED | OUTPATIENT
Start: 2020-02-20 | End: 2020-02-20 | Stop reason: HOSPADM

## 2020-02-20 RX ORDER — ONDANSETRON 2 MG/ML
4 INJECTION INTRAMUSCULAR; INTRAVENOUS EVERY 6 HOURS PRN
Status: DISCONTINUED | OUTPATIENT
Start: 2020-02-20 | End: 2020-02-20 | Stop reason: HOSPADM

## 2020-02-20 RX ORDER — SODIUM CHLORIDE 0.9 % (FLUSH) 0.9 %
10 SYRINGE (ML) INJECTION EVERY 12 HOURS SCHEDULED
Status: DISCONTINUED | OUTPATIENT
Start: 2020-02-20 | End: 2020-02-20 | Stop reason: HOSPADM

## 2020-02-20 RX ADMIN — REGADENOSON 0.4 MG: 0.08 INJECTION, SOLUTION INTRAVENOUS at 15:25

## 2020-02-20 RX ADMIN — CLOPIDOGREL 75 MG: 75 TABLET, FILM COATED ORAL at 07:51

## 2020-02-20 RX ADMIN — SODIUM CHLORIDE, PRESERVATIVE FREE 10 ML: 5 INJECTION INTRAVENOUS at 08:37

## 2020-02-20 RX ADMIN — METOPROLOL SUCCINATE 50 MG: 50 TABLET, EXTENDED RELEASE ORAL at 17:22

## 2020-02-20 RX ADMIN — Medication 10 MILLICURIE: at 14:15

## 2020-02-20 RX ADMIN — ENOXAPARIN SODIUM 40 MG: 40 INJECTION SUBCUTANEOUS at 08:37

## 2020-02-20 RX ADMIN — Medication 30 MILLICURIE: at 15:30

## 2020-02-20 ASSESSMENT — PAIN DESCRIPTION - LOCATION
LOCATION: BACK
LOCATION: BACK

## 2020-02-20 ASSESSMENT — PAIN DESCRIPTION - ONSET: ONSET: GRADUAL

## 2020-02-20 ASSESSMENT — PAIN DESCRIPTION - DESCRIPTORS
DESCRIPTORS: ACHING
DESCRIPTORS: ACHING

## 2020-02-20 ASSESSMENT — PAIN DESCRIPTION - PROGRESSION
CLINICAL_PROGRESSION: GRADUALLY IMPROVING
CLINICAL_PROGRESSION: GRADUALLY IMPROVING

## 2020-02-20 ASSESSMENT — PAIN DESCRIPTION - FREQUENCY: FREQUENCY: CONTINUOUS

## 2020-02-20 ASSESSMENT — PAIN SCALES - GENERAL
PAINLEVEL_OUTOF10: 0
PAINLEVEL_OUTOF10: 3

## 2020-02-20 ASSESSMENT — PAIN DESCRIPTION - PAIN TYPE
TYPE: ACUTE PAIN
TYPE: ACUTE PAIN

## 2020-02-20 ASSESSMENT — PAIN - FUNCTIONAL ASSESSMENT: PAIN_FUNCTIONAL_ASSESSMENT: ACTIVITIES ARE NOT PREVENTED

## 2020-02-20 NOTE — ED PROVIDER NOTES
4.00 E9/L    Monocytes Absolute 0.95 0.10 - 0.95 E9/L    Eosinophils Absolute 0.17 0.05 - 0.50 E9/L    Basophils Absolute 0.03 0.00 - 0.20 Z9/X   Basic Metabolic Panel w/ Reflex to MG   Result Value Ref Range    Sodium 137 132 - 146 mmol/L    Potassium reflex Magnesium 3.6 3.5 - 5.0 mmol/L    Chloride 101 98 - 107 mmol/L    CO2 25 22 - 29 mmol/L    Anion Gap 11 7 - 16 mmol/L    Glucose 140 (H) 74 - 99 mg/dL    BUN 18 8 - 23 mg/dL    CREATININE 1.0 0.5 - 1.0 mg/dL    GFR Non-African American 53 >=60 mL/min/1.73    GFR African American >60     Calcium 9.2 8.6 - 10.2 mg/dL   Lipase   Result Value Ref Range    Lipase 39 13 - 60 U/L   Troponin   Result Value Ref Range    Troponin <0.01 0.00 - 0.03 ng/mL   CK   Result Value Ref Range    Total CK 24 20 - 180 U/L   CK MB   Result Value Ref Range    CK-MB 1.4 0.0 - 4.3 ng/mL   EKG 12 Lead   Result Value Ref Range    Ventricular Rate 82 BPM    Atrial Rate 82 BPM    P-R Interval 264 ms    QRS Duration 88 ms    Q-T Interval 382 ms    QTc Calculation (Bazett) 446 ms    P Axis 62 degrees    R Axis 14 degrees    T Axis 70 degrees       RADIOLOGY:  Interpreted by Radiologist.  CTA PULMONARY W CONTRAST   Final Result   1. Negative for pulmonary embolus. 2. Borderline mild cardiomegaly. 3. Pulmonary arterial hypertension. 4. Lung base atelectasis. This report has been electronically signed by Cody Ricci MD.          EKG: This EKG is signed and interpreted by the EP. Time: 2130  Rate: 82  Rhythm: Sinus  Interpretation: no acute changes  Comparison: stable as compared to patient's most recent EKG    ------------------------- NURSING NOTES AND VITALS REVIEWED ---------------------------   The nursing notes within the ED encounter and vital signs as below have been reviewed by myself.   BP (!) 169/75   Pulse 71   Temp 98 °F (36.7 °C) (Oral)   Resp 18   Ht 5' 5.5\" (1.664 m)   Wt 166 lb (75.3 kg)   SpO2 96%   BMI 27.20 kg/m²   Oxygen Saturation Interpretation: Normal    The patients available past medical records and past encounters were reviewed. ------------------------------ ED COURSE/MEDICAL DECISION MAKING----------------------  Medications   sodium chloride flush 0.9 % injection 10 mL (10 mLs Intravenous Given 2/19/20 2338)   sodium chloride flush 0.9 % injection 10 mL (has no administration in time range)   sodium chloride flush 0.9 % injection 10 mL (has no administration in time range)   acetaminophen (TYLENOL) tablet 650 mg (has no administration in time range)   enoxaparin (LOVENOX) injection 40 mg (has no administration in time range)   diphenhydrAMINE (BENADRYL) injection 50 mg (50 mg Intravenous Given 2/19/20 2220)   methylPREDNISolone sodium (SOLU-MEDROL) injection 125 mg (125 mg Intravenous Given 2/19/20 2220)   0.9 % sodium chloride bolus (0 mLs Intravenous Stopped 2/19/20 2320)   ketorolac (TORADOL) injection 15 mg (15 mg Intravenous Given 2/19/20 2220)   iopamidol (ISOVUE-370) 76 % injection 70 mL (70 mLs Intravenous Given 2/19/20 2344)       Medical Decision Making:    Pt presents for arm pain. Iopamidol, benadryl, solumedrol, and toradol medication given. Labs and imaging obtained, reviewed;  results discussed with patient. Pt to be admitted. This patient's ED course included: a personal history and physicial examination, re-evaluation prior to disposition and multiple bedside re-evaluations    This patient has remained hemodynamically stable and been closely monitored during their ED course. Re-Evaluations:           Re-evaluation. Patients symptoms are improving    Consultations:             12:28 AM - Spoke with Dr Shea Kiran, accepted patient. Counseling: The emergency provider has spoken with the patient and discussed todays results, in addition to providing specific details for the plan of care and counseling regarding the diagnosis and prognosis. Questions are answered at this time and they are agreeable with the plan. --------------------------------- IMPRESSION AND DISPOSITION ---------------------------------    IMPRESSION  1. Left arm pain New Problem       DISPOSITION  Disposition: Discharge to home  Patient condition is good    2/19/20, 9:57 PM.    This note is prepared by Reina Baxter, acting as Scribe for Patt Garcia MD.    Patt Garcia MD:  The scribe's documentation has been prepared under my direction and personally reviewed by me in its entirety. I confirm that the note above accurately reflects all work, treatment, procedures, and medical decision making performed by me.         Patt Garcia MD  02/20/20 [id number]

## 2020-02-20 NOTE — DISCHARGE SUMMARY
The patient was discharged on the same day as history and physical.  Please see history and physical as well as imaging studies, consult and evaluations, and nurse's notes.     Electronically signed by Liset Aguayo DO on 2/20/2020 at 6:46 PM

## 2020-02-20 NOTE — H&P
Internal Medicine History & Physical     Name: Hetal Alvarez  : 1935  Chief Complaint: Arm Pain (left, intermittent); Back Pain (intermittent between the shoulder blades); and Tingling (face)  Primary Care Physician: See Zuniga MD  Admission date: 2020  Date of service: 2020     History of Present Illness  Sara Trammell is a 82y.o. year old female. She presented to the hospital with a chief complaint of chest pain as well as tingling in the left arm and jaw as well as back pain. She states the pain started yesterday when she was getting dressed for the day. She is never had anything like this before but she did have some chest pressure in  and had a negative stress test.  Nothing in particular seem to make her symptoms better or worse. She denies any other issues or problems at this time other than she is undergoing a lot of stress due to issues medically with her . She has an allergy with aspirin that she describes as tingling in her face. She denied any other issues including no nausea or vomiting, no diaphoresis, no shortness of breath. Symptoms were described as moderate in severity. There were no family or friends present at bedside. History is provided by the patient. She is felt to be good historian. ED course:   Initial blood work and imaging studies performed. Admission recommended by ED physician. Case discussed with ED provider.  Meds in ED consisted of the following:  Medications   sodium chloride flush 0.9 % injection 10 mL (10 mLs Intravenous Given 20 4578)   sodium chloride flush 0.9 % injection 10 mL (has no administration in time range)   sodium chloride flush 0.9 % injection 10 mL (has no administration in time range)   acetaminophen (TYLENOL) tablet 650 mg (has no administration in time range)   enoxaparin (LOVENOX) injection 40 mg (has no administration in time range)   diphenhydrAMINE (BENADRYL) injection 50 mg (50 mg Intravenous Given 20 2220)   methylPREDNISolone sodium (SOLU-MEDROL) injection 125 mg (125 mg Intravenous Given 2/19/20 2220)   0.9 % sodium chloride bolus (0 mLs Intravenous Stopped 2/19/20 2320)   ketorolac (TORADOL) injection 15 mg (15 mg Intravenous Given 2/19/20 2220)   iopamidol (ISOVUE-370) 76 % injection 70 mL (70 mLs Intravenous Given 2/19/20 2344)       Past Medical History:   Diagnosis Date    Arthritis     l knee injections with dr Carlene Rae    Chest pain     resolved    Difficult intubation 1-    document on chart    Hypothyroidism     Migraine headache     Osteopenia     Palpitation     resolved    Plantar fasciitis     Skipped heart beats        Past Surgical History:   Procedure Laterality Date    BREAST RECONSTRUCTION Bilateral 2004, 2007    COLONOSCOPY      DILATION AND CURETTAGE OF UTERUS      EYE SURGERY Bilateral     cataract removal    INCONTINENCE SURGERY      MASTECTOMY Bilateral 1982    for fibrocystic breast disease; has saline implants    [place] ENDOVENOUS RF, 1ST VEIN Right 8/10/2018    RIGHT GREASTER SAPHENOUS VEIN STAB PHLEBECTOMIES performed by Rom Alarcon [facility] at [facility]         Family History   Problem Relation Age of Onset    Heart Disease Father        Social History  Patient lives at home with her . Employment: Retired  Illicit drug use- denies  TOBACCO:   reports that she has never smoked. She has never used smokeless tobacco.  ETOH:   reports no history of alcohol use. Home Medications  Prior to Admission medications    Medication Sig Start Date End Date Taking?  Authorizing Provider   metoprolol succinate (TOPROL XL) 50 MG extended release tablet Take 1 tablet by mouth 2 times daily 5/28/19  Yes Shamar Ladd DO   calcitonin (MIACALCIN) 200 UNIT/ACT nasal spray 1 spray by Nasal route daily  4/6/16  Yes Historical Provider, MD   vitamin D (CHOLECALCIFEROL) 1000 UNIT TABS tablet Take 1,000 Int'l Units by mouth daily Takes two tabs daily   Yes Historical Provider, MD   levothyroxine (SYNTHROID) 75 MCG tablet Take 75 mcg by mouth Daily    Yes Historical Provider, MD   amitriptyline (ELAVIL) 25 MG tablet Take 25 mg by mouth nightly    Yes Historical Provider, MD   Calcium-Magnesium-Vitamin D (CITRACAL CALCIUM+D PO) Take 500 mg by mouth 2 times daily. Historical Provider, MD       Allergies  Allergies   Allergen Reactions    Iv Dye [Iodides] Hives    Pcn [Penicillins] Rash    Aspirin Other (See Comments)     tingling    Bactrim Other (See Comments)     Yeast infection    Drixoral [Dexbrompheniramine-Pseudoeph] Other (See Comments)     hyper    Morphine Other (See Comments)     Iv line vein reddened       Review of Systems  Please see HPI above. All bolded are positive. All un-bolded are negative.   Constitutional Symptoms: fever, chills, fatigue, generalized weakness, diaphoresis, increase in thirst, loss of appetite  Eyes: vision change   Ears, Nose, Mouth, Throat: hearing loss, nasal congestion, sores in the mouth  Cardiovascular: chest pain, chest heaviness, palpitations  Respiratory: shortness of breath, wheezing, coughing  Gastrointestinal: abdominal pain, nausea, vomiting, diarrhea, constipation, melena, hematochezia, hematemesis  Genitourinary: dysuria, hematuria, increase in frequency  Musculoskeletal: lower extremity edema, myalgias, arthralgias, back pain  Integumentary: rashes, itching   Neurological: headache, lightheadedness, dizziness, confusion, syncope, numbness, tingling, focal weakness  Psychiatric: depression, suicidal ideation, anxiety  Endocrine: unintentional weight change  Hematologic/Lymphatic: lymphadenopathy, easy bruising, easy bleeding   Allergic/Immunologic: recurrent infections      Objective  VITALS:  BP (!) 152/68   Pulse 88   Temp 97.8 °F (36.6 °C) (Oral)   Resp 18   Ht 5' 5.5\" (1.664 m)   Wt 170 lb (77.1 kg)   SpO2 99%   BMI 27.86 kg/m²     Physical Exam:  General: awake, alert, oriented to person, place, time, and purpose, appears stated age, cooperative, no acute distress, pleasant, appropriate mood  Eyes: conjunctivae/corneas clear, sclera non icteric, EOMI  Ears: no obvious scars, no lesions, no masses, hearing intact  Mouth: mucous membranes moist, no obvious oral sores  Head: normocephalic, atraumatic  Neck: no JVD, no adenopathy, no thyromegaly, neck is supple, trachea is midline  Back: ROM normal, no CVA tenderness. Chest: no pain on palpation  Lungs: clear to auscultation bilaterally, without rhonchi, crackle, wheezing, or rale, no retractions or use of accessory muscles  Heart: regular rate and regular rhythm, no murmur, normal S1, S2  Abdomen: soft, non-tender; bowel sounds normal; no masses, no organomegaly  : Deferred   Extremities: no lower extremity edema, extremities atraumatic, no cyanosis, no clubbing, 2+ pedal pulses palpated  Skin: normal color, normal texture, normal turgor, no rashes, no lesions  Neurologic:5/5 muscle strength throughout, normal muscle tone throughout, face symmetric, hearing intact, tongue midline, speech appropriate without slurring, sensation to fine touch intact in upper and lower extremities    Labs-   Lab Results   Component Value Date    WBC 8.4 02/19/2020    HGB 13.4 02/19/2020    HCT 40.8 02/19/2020     02/19/2020     02/19/2020    K 3.6 02/19/2020     02/19/2020    CREATININE 1.0 02/19/2020    BUN 18 02/19/2020    CO2 25 02/19/2020    GLUCOSE 140 (H) 02/19/2020    ALT 14 05/15/2012    AST 19 05/15/2012    INR 1.0 08/10/2018     Lab Results   Component Value Date    CKTOTAL 24 02/19/2020    CKMB 1.4 02/19/2020    TROPONINI <0.01 02/20/2020       Recent Radiological Studies:  CTA PULMONARY W CONTRAST   Final Result   1. Negative for pulmonary embolus. 2. Borderline mild cardiomegaly. 3. Pulmonary arterial hypertension. 4. Lung base atelectasis.        This report has been electronically signed by Lucie Caldera MD.          Assessment  Active Hospital Problems    Diagnosis    Chest pain [R07.9]     Priority: High    Hypothyroidism [E03.9]    Left arm pain [M79.602]    Venous insufficiency of both lower extremities [I87.2]    Hyperlipidemia [E78.5]       Patient Active Problem List    Diagnosis Date Noted    Chest pain      Priority: High    Hypothyroidism     Left arm pain     Venous insufficiency of both lower extremities 06/13/2018    Varicose veins of both lower extremities 04/16/2018    Hyperlipidemia     Migraine headache     Plantar fasciitis     Osteopenia        Plan  Arm/back pain--ACS r/o, possible TIA: Observation. Telemetry. CTA chest neg for PE. Cardiology following--defer need for further cardiac testing to cardiology-plans for stress test and echo. CE's. Plavix-- ASA allergy. Lipid panel. Normal neurologic exam.  Carotid ultrasound. · Continue home medications  · Follow labs  · DVT prophylaxis. · Please see orders for further management and care. ·  for discharge planning  · Discharge plan: TBD pending testing     Magdiel Lanza DO  2/20/2020  12:13 PM    I can be reached through [employer]. NOTE:  This report was transcribed using voice recognition software. Every effort was made to ensure accuracy; however, inadvertent computerized transcription errors may be present.

## 2020-02-20 NOTE — CONSULTS
HISTORY:     - Hyperlipidemia   - She denies any history of a TIA   - Hypothyroidism   - Arthritis   - Breast Fibrous s/p bilateral mastectomy         Past Surgical History:    Past Surgical History:   Procedure Laterality Date    BREAST RECONSTRUCTION Bilateral 2004, 2007    COLONOSCOPY      DILATION AND CURETTAGE OF UTERUS      EYE SURGERY Bilateral     cataract removal    INCONTINENCE SURGERY      MASTECTOMY Bilateral 1982    for fibrocystic breast disease; has saline implants    [place] ENDOVENOUS RF, 1ST VEIN Right 8/10/2018    RIGHT GREASTER SAPHENOUS VEIN STAB PHLEBECTOMIES performed by Smith Cooper MD at [facility]         Medications Prior to admit:  Prior to Admission medications    Medication Sig Start Date End Date Taking? Authorizing Provider   metoprolol succinate (TOPROL XL) 50 MG extended release tablet Take 1 tablet by mouth 2 times daily 5/28/19  Yes Mei Greenberg DO   calcitonin (MIACALCIN) 200 UNIT/ACT nasal spray 1 spray by Nasal route daily  4/6/16  Yes Historical Provider, MD   vitamin D (CHOLECALCIFEROL) 1000 UNIT TABS tablet Take 1,000 Int'l Units by mouth daily Takes two tabs daily   Yes Historical Provider, MD   levothyroxine (SYNTHROID) 75 MCG tablet Take 75 mcg by mouth Daily    Yes Historical Provider, MD   amitriptyline (ELAVIL) 25 MG tablet Take 25 mg by mouth nightly    Yes Historical Provider, MD   Calcium-Magnesium-Vitamin D (CITRACAL CALCIUM+D PO) Take 500 mg by mouth 2 times daily.     Historical Provider, MD       Current Medications:    Current Facility-Administered Medications: sodium chloride flush 0.9 % injection 10 mL, 10 mL, Intravenous, 2 times per day  sodium chloride flush 0.9 % injection 10 mL, 10 mL, Intravenous, PRN  acetaminophen (TYLENOL) tablet 650 mg, 650 mg, Oral, Q4H PRN  acetaminophen (TYLENOL) tablet 650 mg, 650 mg, Oral, Q6H PRN  acetaminophen (TYLENOL) suppository 650 mg, 650 mg, Rectal, Q6H PRN  magnesium hydroxide (MILK OF MAGNESIA) 400 MG/5ML suspension 30 mL, 30 mL, Oral, Daily PRN  promethazine (PHENERGAN) tablet 12.5 mg, 12.5 mg, Oral, Q6H PRN  ondansetron (ZOFRAN) injection 4 mg, 4 mg, Intravenous, Q6H PRN  atorvastatin (LIPITOR) tablet 40 mg, 40 mg, Oral, Nightly  enoxaparin (LOVENOX) injection 40 mg, 40 mg, Subcutaneous, Daily  [COMPLETED] clopidogrel (PLAVIX) tablet 75 mg, 75 mg, Oral, Once **FOLLOWED BY** [START ON 2/21/2020] clopidogrel (PLAVIX) tablet 75 mg, 75 mg, Oral, Daily  sodium chloride flush 0.9 % injection 10 mL, 10 mL, Intravenous, BID    Allergies: Iv dye [iodides]; Pcn [penicillins]; Aspirin; Bactrim; Drixoral [dexbrompheniramine-pseudoeph]; and Morphine    Social History:    ETOH- denies  Tobacco- denies  Illicit- denies      Family History: Not pertinent d/t advanced age     REVIEW OF SYSTEMS:     · Constitutional: Denies fatigue, fevers, chills or night sweats. No change in appetite. No recent weight change. · Eyes: Denies visual changes. · ENT: Denies headaches or hearing loss. No epistaxis   · Cardiovascular: See HPI   · Respiratory: Denies RUBIO, cough, orthopnea or PND. No hemoptysis   · Gastrointestinal: Denies hematemesis or anorexia. No hematochezia or melena    · Genitourinary: Denies urgency, dysuria or hematuria. · Musculoskeletal: Denies gait disturbance, weakness. · Integumentary: Denies rash, hives or pruritis   · Neurological: Denies dizziness, headaches or seizures. · Psychiatric: Denies anxiety or depression. · Endocrine: Denies temperature intolerance. No recent weight change. .  · Hematologic/Lymphatic: Denies abnormal bruising or bleeding. No swollen lymph nodes    PHYSICAL EXAM:   /72   Pulse 64   Temp 98.1 °F (36.7 °C) (Oral)   Resp 18   Ht 5' 5.5\" (1.664 m)   Wt 170 lb (77.1 kg)   SpO2 98%   BMI 27.86 kg/m²   CONST:  Well developed, well nourished female who appears of stated age. Awake, alert and cooperative. No apparent distress.    CHEST: Chest symmetrical and non-tender to palpation. No accessory muscle use or intercostal retractions  RESPIRATORY: Lung sounds - clear throughout fields  CARDIOVASCULAR:  Heart Ausculation-  Regular rate and rhythm,No murmur noted   PV: No lower extremity edema. Feet appear to be well perfused   ABDOMEN: Soft, non-tender to light palpation. Bowel sounds present. MS: Good muscle strength and tone. No atrophy or abnormal movements. : Deferred  SKIN: Warm and dry no statis dermatitis or ulcers   NEURO / PSYCH: Oriented to person, place and time. Speech clear and appropriate. Follows all commands. Pleasant affect     12 lead EKG on presentation: reviewed, see above     Tele: Reviewed-- no significant events noted       Intake/Output Summary (Last 24 hours) at 2/20/2020 0820  Last data filed at 2/19/2020 2320  Gross per 24 hour   Intake 1000 ml   Output --   Net 1000 ml         LABS:  CBC:   Recent Labs     02/19/20 2208   WBC 8.4   HGB 13.4   HCT 40.8        BMP:   Recent Labs     02/19/20 2208      K 3.6   CO2 25   BUN 18   CREATININE 1.0   LABGLOM 53   CALCIUM 9.2     Mag: No results for input(s): MG in the last 72 hours. Phos: No results for input(s): PHOS in the last 72 hours. TSH: No results for input(s): TSH in the last 72 hours. HgA1c: No results found for: LABA1C  proBNP: No results for input(s): PROBNP in the last 72 hours. PT/INR: No results for input(s): PROTIME, INR in the last 72 hours. APTT:No results for input(s): APTT in the last 72 hours. CARDIAC ENZYMES:  Recent Labs     02/19/20 2208 02/20/20  0400   CKTOTAL 24  --    CKMB 1.4  --    TROPONINI <0.01 <0.01     FASTING LIPID PANEL:  Lab Results   Component Value Date    CHOL 213 02/20/2020    HDL 60 02/20/2020    LDLCALC 132 02/20/2020    TRIG 103 02/20/2020     LIVER PROFILE:No results for input(s): AST, ALT, LABALBU in the last 72 hours. IMAGING on presentation:    CTA chest  2/19/2020: Impression   1. Negative for pulmonary embolus.     2.

## 2020-02-20 NOTE — DISCHARGE INSTR - DIET

## 2020-02-24 ENCOUNTER — TELEPHONE (OUTPATIENT)
Dept: ADMINISTRATIVE | Age: 85
End: 2020-02-24

## 2020-02-24 NOTE — TELEPHONE ENCOUNTER
Pt called to schedule her HOSP f/u with Carla Nur. Pt was admitted to [facility] for observation. Pt states she was having some tingling and numbness in her L arm. Pt was dc'd 2/20/20 and instructed to f/u with Dr. Maria De Jesus Plaza.

## 2020-02-24 NOTE — TELEPHONE ENCOUNTER
Per Dr. Chelsey Guo note from 2/20/20, patient can F/U in (1) month. Patient notified of Dr. Chelsey Guo recommendation. F/U scheduled for 3/26/20 at 8:20 a.m.

## 2020-03-25 ENCOUNTER — TELEPHONE (OUTPATIENT)
Dept: ADMINISTRATIVE | Age: 85
End: 2020-03-25

## 2020-03-25 NOTE — TELEPHONE ENCOUNTER
Pt called in, wants to RS appt for tomorrow w/ Dr Guerda Rowan, it is a HFU, please advise pt at [phone]

## 2020-04-01 ENCOUNTER — TELEPHONE (OUTPATIENT)
Dept: CARDIOLOGY CLINIC | Age: 85
End: 2020-04-01

## 2020-04-01 NOTE — TELEPHONE ENCOUNTER
Due to the current COVID-19 pandemic, patient was offered virtual visit using a camera-enabled device (smart phone, tablet or computer) and reliable WiFi. Patient was notified that for purposes of billing, this is a virtual visit with their provider for which we will submit a claim for reimbursement with their insurance company. Patient was notified that they will be responsible for any copays, coinsurance amounts or other amounts not covered by their insurance company. Patient agreed to all of the above and is scheduled.

## 2020-04-06 ENCOUNTER — VIRTUAL VISIT (OUTPATIENT)
Dept: CARDIOLOGY CLINIC | Age: 85
End: 2020-04-06
Payer: MEDICARE

## 2020-04-06 VITALS
WEIGHT: 167 LBS | BODY MASS INDEX: 28.51 KG/M2 | HEIGHT: 64 IN | SYSTOLIC BLOOD PRESSURE: 154 MMHG | DIASTOLIC BLOOD PRESSURE: 69 MMHG | HEART RATE: 70 BPM

## 2020-04-06 PROCEDURE — 99214 OFFICE O/P EST MOD 30 MIN: CPT | Performed by: INTERNAL MEDICINE

## 2020-04-06 RX ORDER — METOPROLOL SUCCINATE 50 MG/1
50 TABLET, EXTENDED RELEASE ORAL 2 TIMES DAILY
Qty: 180 TABLET | Refills: 3 | Status: SHIPPED
Start: 2020-04-06 | End: 2021-05-05 | Stop reason: SDUPTHER

## 2020-04-06 RX ORDER — AMLODIPINE BESYLATE 2.5 MG/1
2.5 TABLET ORAL DAILY
COMMUNITY
Start: 2020-03-20 | End: 2020-04-06

## 2020-04-06 RX ORDER — IBUPROFEN 200 MG
1 CAPSULE ORAL DAILY
COMMUNITY
End: 2020-11-02 | Stop reason: ALTCHOICE

## 2020-04-06 RX ORDER — ATORVASTATIN CALCIUM 20 MG/1
20 TABLET, FILM COATED ORAL DAILY
Qty: 30 TABLET | Refills: 3 | Status: SHIPPED
Start: 2020-04-06 | End: 2020-11-02 | Stop reason: SDUPTHER

## 2020-04-06 RX ORDER — METOPROLOL SUCCINATE 50 MG/1
50 TABLET, EXTENDED RELEASE ORAL 2 TIMES DAILY
Qty: 180 TABLET | Refills: 3 | Status: CANCELLED | OUTPATIENT
Start: 2020-04-06

## 2020-04-06 RX ORDER — ACETAMINOPHEN 650 MG/1
SUPPOSITORY RECTAL
COMMUNITY
Start: 2020-02-20 | End: 2021-03-10

## 2020-04-06 RX ORDER — AMLODIPINE BESYLATE 5 MG/1
2.5 TABLET ORAL DAILY
Qty: 30 TABLET | Refills: 3 | Status: SHIPPED
Start: 2020-04-06 | End: 2020-05-04 | Stop reason: DRUGHIGH

## 2020-04-06 NOTE — PROGRESS NOTES
(MIACALCIN) 200 UNIT/ACT nasal spray 1 spray by Nasal route daily  Yes Historical Provider, MD   vitamin D (CHOLECALCIFEROL) 1000 UNIT TABS tablet Take 1,000 Int'l Units by mouth daily Takes two tabs daily Yes Historical Provider, MD   levothyroxine (SYNTHROID) 75 MCG tablet Take 75 mcg by mouth Daily  Yes Historical Provider, MD   amitriptyline (ELAVIL) 25 MG tablet Take 25 mg by mouth nightly  Yes Historical Provider, MD   Calcium-Magnesium-Vitamin D (CITRACAL CALCIUM+D PO) Take 500 mg by mouth 2 times daily. Historical Provider, MD       Social History     Tobacco Use    Smoking status: Never Smoker    Smokeless tobacco: Never Used   Substance Use Topics    Alcohol use: No    Drug use: No      Blood pressure (!) 154/69, pulse 70, height 5' 4\" (1.626 m), weight 167 lb (75.8 kg). PHYSICAL EXAMINATION:  [ INSTRUCTIONS:  \"[x]\" Indicates a positive item  \"[]\" Indicates a negative item  -- DELETE ALL ITEMS NOT EXAMINED]  Vital Signs: (As obtained by patient/caregiver or practitioner observation)    Blood pressure- 154/69 Heart rate-    Respiratory rate-    Temperature-  Pulse oximetry-     Constitutional: [] Appears well-developed and well-nourished [] No apparent distress      [] Abnormal-   Mental status  [x] Alert and awake  [] Oriented to person/place/time []Able to follow commands      Eyes:  EOM    [x]  Normal  [] Abnormal-  Sclera  []  Normal  [] Abnormal -         Discharge []  None visible  [] Abnormal -    HENT:   [x] Normocephalic, atraumatic.   [] Abnormal   [] Mouth/Throat: Mucous membranes are moist.     External Ears [] Normal  [] Abnormal-     Neck: [x] No visualized mass     Pulmonary/Chest: [x] Respiratory effort normal.  [] No visualized signs of difficulty breathing or respiratory distress        [] Abnormal-      Musculoskeletal:   [] Normal gait with no signs of ataxia         [] Normal range of motion of neck        [] Abnormal-       Neurological:        [x] No Facial Asymmetry (Cranial nerve 7 motor function) (limited exam to video visit)          [] No gaze palsy        [] Abnormal-         Skin:        [x] No significant exanthematous lesions or discoloration noted on facial skin         [] Abnormal-            Psychiatric:       [x] Normal Affect [] No Hallucinations        [] Abnormal-     Other pertinent observable physical exam findings-     Due to this being a TeleHealth encounter, evaluation of the following organ systems is limited: Vitals/Constitutional/EENT/Resp/CV/GI//MS/Neuro/Skin/Heme-Lymph-Imm. ASSESSMENT:   · Atypical chest pain, resolved, normal stress test without any scars, ischemia. Normal LV and RV size and function   · Hypertension not well controlled   · HLD, not well controlled. ASCVD score could not be calculated due to her age. · Hypothyroidism on HRT  · Grief reaction due to her 's death      Plan:   · Echo and stress test results discussed with the patient. · Advised to increase Norvasc to 5 mg po daily due to poorly controlled  · Will add low dose statin with Atorvastatin 20 mg po daily for high cholesterol  · Continue rest of the current medications. · Follow up with me in 3 months. An  electronic signature was used to authenticate this note. --Gaby Obrien MD on 4/6/2020 at 2:29 PM        Pursuant to the emergency declaration under the [facility], [facility] waiver authority and the [employer] Act, this Virtual  Visit was conducted, with patient's consent, to reduce the patient's risk of exposure to COVID-19 and provide continuity of care for an established patient. Services were provided through a video synchronous discussion virtually to substitute for in-person clinic visit.

## 2020-05-04 ENCOUNTER — TELEPHONE (OUTPATIENT)
Dept: CARDIOLOGY CLINIC | Age: 85
End: 2020-05-04

## 2020-05-04 RX ORDER — AMLODIPINE BESYLATE 2.5 MG/1
2.5 TABLET ORAL DAILY
COMMUNITY
End: 2020-11-02

## 2020-05-04 RX ORDER — ISOSORBIDE MONONITRATE 30 MG/1
30 TABLET, EXTENDED RELEASE ORAL DAILY
COMMUNITY
End: 2020-05-04 | Stop reason: SDUPTHER

## 2020-05-04 RX ORDER — ISOSORBIDE MONONITRATE 30 MG/1
30 TABLET, EXTENDED RELEASE ORAL DAILY
Qty: 30 TABLET | Refills: 11 | Status: SHIPPED
Start: 2020-05-04 | End: 2021-04-26 | Stop reason: SDUPTHER

## 2020-05-05 ENCOUNTER — HOSPITAL ENCOUNTER (OUTPATIENT)
Age: 85
Discharge: HOME | End: 2020-05-05
Payer: MEDICARE

## 2020-05-05 LAB
ALBUMIN SERPL-MCNC: 4 G/DL (ref 3.5–5.2)
ALP BLD-CCNC: 69 U/L (ref 35–104)
ALT SERPL-CCNC: 22 U/L (ref 0–32)
ANION GAP SERPL CALCULATED.3IONS-SCNC: 10 MMOL/L (ref 7–16)
AST SERPL-CCNC: 17 U/L (ref 0–31)
BASOPHILS ABSOLUTE: 0 E9/L (ref 0–0.2)
BASOPHILS RELATIVE PERCENT: 0 % (ref 0–2)
BILIRUB SERPL-MCNC: 0.6 MG/DL (ref 0–1.2)
BILIRUBIN DIRECT: <0.2 MG/DL (ref 0–0.3)
BILIRUBIN, INDIRECT: NORMAL MG/DL (ref 0–1)
BUN BLDV-MCNC: 18 MG/DL (ref 8–23)
CALCIUM SERPL-MCNC: 9.1 MG/DL (ref 8.6–10.2)
CHLORIDE BLD-SCNC: 104 MMOL/L (ref 98–107)
CHOLESTEROL, TOTAL: 146 MG/DL (ref 0–199)
CO2: 27 MMOL/L (ref 22–29)
CREAT SERPL-MCNC: 0.9 MG/DL (ref 0.5–1)
EOSINOPHILS ABSOLUTE: 0.08 E9/L (ref 0.05–0.5)
EOSINOPHILS RELATIVE PERCENT: 1 % (ref 0–6)
GFR AFRICAN AMERICAN: >60
GFR NON-AFRICAN AMERICAN: 60 ML/MIN/1.73
GLUCOSE BLD-MCNC: 96 MG/DL (ref 74–99)
HCT VFR BLD CALC: 44.6 % (ref 34–48)
HDLC SERPL-MCNC: 63 MG/DL
HEMOGLOBIN: 14.7 G/DL (ref 11.5–15.5)
IRON: 105 MCG/DL (ref 37–145)
LDL CHOLESTEROL CALCULATED: 61 MG/DL (ref 0–99)
LYMPHOCYTES ABSOLUTE: 1.9 E9/L (ref 1.5–4)
LYMPHOCYTES RELATIVE PERCENT: 25 % (ref 20–42)
MCH RBC QN AUTO: 31.1 PG (ref 26–35)
MCHC RBC AUTO-ENTMCNC: 33 % (ref 32–34.5)
MCV RBC AUTO: 94.5 FL (ref 80–99.9)
MONOCYTES ABSOLUTE: 0.84 E9/L (ref 0.1–0.95)
MONOCYTES RELATIVE PERCENT: 11 % (ref 2–12)
NEUTROPHILS ABSOLUTE: 4.79 E9/L (ref 1.8–7.3)
NEUTROPHILS RELATIVE PERCENT: 63 % (ref 43–80)
PDW BLD-RTO: 13.7 FL (ref 11.5–15)
PLATELET # BLD: 192 E9/L (ref 130–450)
PMV BLD AUTO: 10.1 FL (ref 7–12)
POTASSIUM SERPL-SCNC: 4 MMOL/L (ref 3.5–5)
RBC # BLD: 4.72 E12/L (ref 3.5–5.5)
RBC # BLD: NORMAL 10*6/UL
SEDIMENTATION RATE, ERYTHROCYTE: 7 MM/HR (ref 0–20)
SODIUM BLD-SCNC: 141 MMOL/L (ref 132–146)
T4 TOTAL: 7.1 MCG/DL (ref 4.5–11.7)
TOTAL PROTEIN: 6.6 G/DL (ref 6.4–8.3)
TRIGL SERPL-MCNC: 108 MG/DL (ref 0–149)
TSH SERPL DL<=0.05 MIU/L-ACNC: 0.06 UIU/ML (ref 0.27–4.2)
URIC ACID, SERUM: 4.1 MG/DL (ref 2.4–5.7)
VITAMIN D 25-HYDROXY: 32 NG/ML (ref 30–100)
VLDLC SERPL CALC-MCNC: 22 MG/DL
WBC # BLD: 7.6 E9/L (ref 4.5–11.5)

## 2020-05-05 PROCEDURE — 82306 VITAMIN D 25 HYDROXY: CPT

## 2020-05-05 PROCEDURE — 84436 ASSAY OF TOTAL THYROXINE: CPT

## 2020-05-05 PROCEDURE — 82248 BILIRUBIN DIRECT: CPT

## 2020-05-05 PROCEDURE — 84550 ASSAY OF BLOOD/URIC ACID: CPT

## 2020-05-05 PROCEDURE — 80061 LIPID PANEL: CPT

## 2020-05-05 PROCEDURE — 84443 ASSAY THYROID STIM HORMONE: CPT

## 2020-05-05 PROCEDURE — 85025 COMPLETE CBC W/AUTO DIFF WBC: CPT

## 2020-05-05 PROCEDURE — 85651 RBC SED RATE NONAUTOMATED: CPT

## 2020-05-05 PROCEDURE — 83540 ASSAY OF IRON: CPT

## 2020-05-05 PROCEDURE — 36415 COLL VENOUS BLD VENIPUNCTURE: CPT

## 2020-05-05 PROCEDURE — 80053 COMPREHEN METABOLIC PANEL: CPT

## 2020-09-30 ENCOUNTER — OFFICE VISIT (OUTPATIENT)
Dept: VASCULAR SURGERY | Age: 85
End: 2020-09-30
Payer: MEDICARE

## 2020-09-30 VITALS
HEIGHT: 65 IN | WEIGHT: 171 LBS | SYSTOLIC BLOOD PRESSURE: 120 MMHG | BODY MASS INDEX: 28.49 KG/M2 | RESPIRATION RATE: 16 BRPM | DIASTOLIC BLOOD PRESSURE: 72 MMHG

## 2020-09-30 PROCEDURE — 99213 OFFICE O/P EST LOW 20 MIN: CPT | Performed by: PHYSICIAN ASSISTANT

## 2020-09-30 NOTE — PROGRESS NOTES
Vascular Surgery Outpatient Progress Note      Chief Complaint   Patient presents with    Check-Up     complains of bilateral leg tinging       HISTORY OF PRESENT ILLNESS:                The patient is a 82 y.o. female who returns for evaluation of peripheral vascular disease. She was previously known to our practice for venous insufficiency status post stab phlebectomies of the right lower extremity in 2018. She states she has had a several month history of numbness and tingling to bilateral feet that extends to her distal lower legs. She states walking seems to make it worse. She denies any cramping pain to the legs with walking. She denies lower extremity ulcers, rest pain in feet, or disabling claudication. Patient is not diabetic. She is a non-smoker. She has no previous history of low back chronic pain or surgeries. She states she has been on gabapentin in the past for a different issue and it did seem to help alleviate her symptoms at that time. She has not recently had any gabapentin.     Past Medical History:        Diagnosis Date    Abdominal pain     Arthritis     l knee injections with dr Buster Cross    Chest pain     resolved    Difficult intubation 1-    document on chart    Hypothyroidism     Migraine headache     Osteopenia     Palpitation     resolved    Plantar fasciitis     Skipped heart beats     Tingling of both feet      Past Surgical History:        Procedure Laterality Date    BREAST RECONSTRUCTION Bilateral 2004, 2007    COLONOSCOPY      DILATION AND CURETTAGE OF UTERUS      EYE SURGERY Bilateral     cataract removal    INCONTINENCE SURGERY      MASTECTOMY Bilateral 1982    for fibrocystic breast disease; has saline implants    [place] ENDOVENOUS RF, 1ST VEIN Right 8/10/2018    RIGHT GREASTER SAPHENOUS VEIN STAB PHLEBECTOMIES performed by Roula Dawson MD at [facility]       Current Medications:   Prior to Admission medications    Medication Sig Start Date End Date Taking? Authorizing Provider   amLODIPine (NORVASC) 2.5 MG tablet Take 2.5 mg by mouth 2 times daily    Yes Historical Provider, MD   isosorbide mononitrate (IMDUR) 30 MG extended release tablet Take 1 tablet by mouth daily 5/4/20  Yes Claudene Iles, MD   acetaminophen (TYLENOL) 650 MG suppository Take by mouth 2/20/20  Yes Historical Provider, MD   calcium carbonate (OYSTER SHELL CALCIUM 500 MG) 1250 (500 Ca) MG tablet Take 1 tablet by mouth daily   Yes Historical Provider, MD   atorvastatin (LIPITOR) 20 MG tablet Take 1 tablet by mouth daily 4/6/20  Yes Claudene Iles, MD   metoprolol succinate (TOPROL XL) 50 MG extended release tablet Take 1 tablet by mouth 2 times daily 4/6/20  Yes Claudene Iles, MD   calcitonin (MIACALCIN) 200 UNIT/ACT nasal spray 1 spray by Nasal route daily  4/6/16  Yes Historical Provider, MD   Calcium-Magnesium-Vitamin D (CITRACAL CALCIUM+D PO) Take 500 mg by mouth 2 times daily. Yes Historical Provider, MD   vitamin D (CHOLECALCIFEROL) 1000 UNIT TABS tablet Take 1,000 Int'l Units by mouth daily Takes two tabs daily   Yes Historical Provider, MD   levothyroxine (SYNTHROID) 75 MCG tablet Take 75 mcg by mouth Daily    Yes Historical Provider, MD   amitriptyline (ELAVIL) 25 MG tablet Take 25 mg by mouth nightly    Yes Historical Provider, MD     Allergies: Iv dye [iodides]; Pcn [penicillins]; Aspirin; Bactrim; Drixoral [dexbrompheniramine-pseudoeph];  Morphine; and Sulfamethoxazole-trimethoprim    Social History     Socioeconomic History    Marital status:      Spouse name: Not on file    Number of children: Not on file    Years of education: Not on file    Highest education level: Not on file   Occupational History    Not on file   Social Needs    Financial resource strain: Not on file    Food insecurity     Worry: Not on file     Inability: Not on file    Transportation needs     Medical: Not on file     Non-medical: Not on file   Tobacco Use    Smoking status: Never Smoker    Smokeless tobacco: Never Used   Substance and Sexual Activity    Alcohol use: No    Drug use: No    Sexual activity: Not on file   Lifestyle    Physical activity     Days per week: Not on file     Minutes per session: Not on file    Stress: Not on file   Relationships    Social connections     Talks on phone: Not on file     Gets together: Not on file     Attends [employer] service: Not on file     Active member of club or organization: Not on file     Attends meetings of clubs or organizations: Not on file     Relationship status: Not on file    Intimate partner violence     Fear of current or ex partner: Not on file     Emotionally abused: Not on file     Physically abused: Not on file     Forced sexual activity: Not on file   Other Topics Concern    Not on file   Social History Narrative    Not on file        Family History   Problem Relation Age of Onset    Heart Disease Father        REVIEW OF SYSTEMS (New symptoms):    Eyes:      Blurred vision:  No [x]/Yes []               Diplopia:   No [x]/Yes []               Vision loss:       No [x]/Yes []   Ears, nose, throat:             Hearing loss:    No [x]/Yes []      Vertigo:   No [x]/Yes []                       Swallowing problem:  No [x]/Yes []               Nose bleeds:   No [x]/Yes []      Voice hoarseness:  No [x]/Yes []  Respiratory:             Cough:   No [x]/Yes []      Pleuritic chest pain:  No [x]/Yes []                        Dyspnea:   No [x]/Yes []      Wheezing:   No [x]/Yes []  Cardiovascular:             Angina:   No [x]/Yes []      Palpitations:   No [x]/Yes []          Claudication:    No [x]/Yes []      Leg swelling:   No [x]/Yes []  Gastrointestinal:             Nausea or vomiting:  No [x]/Yes []               Abdominal pain:  No [x]/Yes []                     Intestinal bleeding: No [x]/Yes []  Musculoskeletal:             Leg pain:   No [x]/Yes []      Back pain:   No [x]/Yes [] bilaterally with easily palpable distal pulses. Her symptoms are not due to vascular disease. Her symptoms seem to be consistent with neuropathic pain. Recommend PCP management and referral out as warranted. She may benefit from a trial of gabapentin to see if this helps alleviate her symptoms. I asked her to call PRN and we would be happy to see her back. Pt seen and plan reviewed with Dr. Aleah Feliz. Rosey Babinski, PA-C      Return if symptoms worsen or fail to improve.

## 2020-10-13 ENCOUNTER — HOSPITAL ENCOUNTER (OUTPATIENT)
Age: 85
Discharge: HOME | End: 2020-10-15

## 2020-10-13 PROCEDURE — 88305 TISSUE EXAM BY PATHOLOGIST: CPT

## 2020-11-02 ENCOUNTER — HOSPITAL ENCOUNTER (OUTPATIENT)
Dept: ULTRASOUND IMAGING | Age: 85
Discharge: HOME | End: 2020-11-04
Payer: MEDICARE

## 2020-11-02 ENCOUNTER — TELEPHONE (OUTPATIENT)
Dept: CARDIOLOGY CLINIC | Age: 85
End: 2020-11-02

## 2020-11-02 ENCOUNTER — OFFICE VISIT (OUTPATIENT)
Dept: CARDIOLOGY CLINIC | Age: 85
End: 2020-11-02
Payer: MEDICARE

## 2020-11-02 VITALS
HEART RATE: 70 BPM | RESPIRATION RATE: 16 BRPM | SYSTOLIC BLOOD PRESSURE: 136 MMHG | DIASTOLIC BLOOD PRESSURE: 72 MMHG | BODY MASS INDEX: 27.8 KG/M2 | HEIGHT: 66 IN | WEIGHT: 173 LBS

## 2020-11-02 PROCEDURE — 99214 OFFICE O/P EST MOD 30 MIN: CPT | Performed by: INTERNAL MEDICINE

## 2020-11-02 PROCEDURE — 93000 ELECTROCARDIOGRAM COMPLETE: CPT | Performed by: INTERNAL MEDICINE

## 2020-11-02 PROCEDURE — 93970 EXTREMITY STUDY: CPT

## 2020-11-02 RX ORDER — ATORVASTATIN CALCIUM 20 MG/1
20 TABLET, FILM COATED ORAL DAILY
Qty: 90 TABLET | Refills: 3 | Status: SHIPPED
Start: 2020-11-02 | End: 2021-01-28

## 2020-11-02 NOTE — PROGRESS NOTES
Surgical History:   Procedure Laterality Date    BREAST RECONSTRUCTION Bilateral 2004, 2007    COLONOSCOPY  10/13/2020    DILATION AND CURETTAGE OF UTERUS      EYE SURGERY Bilateral     cataract removal    INCONTINENCE SURGERY      MASTECTOMY Bilateral 1982    for fibrocystic breast disease; has saline implants    [place] ENDOVENOUS RF, 1ST VEIN Right 8/10/2018    RIGHT GREASTER SAPHENOUS VEIN STAB PHLEBECTOMIES [facility]         Family History:  Family History   Problem Relation Age of Onset    Heart Disease Father        Social History:  Social History     Socioeconomic History    Marital status:      Spouse name: Not on file    Number of children: Not on file    Years of education: Not on file    Highest education level: Not on file   Occupational History    Not on file   Social Needs    Financial resource strain: Not on file    Food insecurity     Worry: Not on file     Inability: Not on file    Transportation needs     Medical: Not on file     Non-medical: Not on file   Tobacco Use    Smoking status: Never Smoker    Smokeless tobacco: Never Used   Substance and Sexual Activity    Alcohol use: No    Drug use: No    Sexual activity: Not on file   Lifestyle    Physical activity     Days per week: Not on file     Minutes per session: Not on file    Stress: Not on file   Relationships    Social connections     Talks on phone: Not on file     Gets together: Not on file     Attends Mos[employer] service: Not on file     Active member of club or organization: Not on file     Attends meetings of clubs or organizations: Not on file     Relationship status: Not on file    Intimate partner violence     Fear of current or ex partner: Not on file     Emotionally abused: Not on file     Physically abused: Not on file     Forced sexual activity: Not on file   Other Topics Concern    Not on file   Social History Narrative    Not on file Allergies: Allergies   Allergen Reactions    Iv Dye [Iodides] Hives    Pcn [Penicillins] Rash    Aspirin Other (See Comments)     tingling    Bactrim Other (See Comments)     Yeast infection    Drixoral [Dexbrompheniramine-Pseudoeph] Other (See Comments)     hyper    Morphine Other (See Comments)     Iv line vein reddened    Sulfamethoxazole-Trimethoprim Other (See Comments)       Current Medications:  Current Outpatient Medications   Medication Sig Dispense Refill    amLODIPine (NORVASC) 2.5 MG tablet Take 2.5 mg by mouth daily       isosorbide mononitrate (IMDUR) 30 MG extended release tablet Take 1 tablet by mouth daily 30 tablet 11    atorvastatin (LIPITOR) 20 MG tablet Take 1 tablet by mouth daily 30 tablet 3    metoprolol succinate (TOPROL XL) 50 MG extended release tablet Take 1 tablet by mouth 2 times daily 180 tablet 3    Calcium-Magnesium-Vitamin D (CITRACAL CALCIUM+D PO) Take 600 mg by mouth 2 times daily       vitamin D (CHOLECALCIFEROL) 1000 UNIT TABS tablet Take 1,000 Int'l Units by mouth daily Takes two tabs daily      levothyroxine (SYNTHROID) 75 MCG tablet Take 75 mcg by mouth Daily       amitriptyline (ELAVIL) 25 MG tablet Take 25 mg by mouth nightly       acetaminophen (TYLENOL) 650 MG suppository Take by mouth      calcium carbonate (OYSTER SHELL CALCIUM 500 MG) 1250 (500 Ca) MG tablet Take 1 tablet by mouth daily      calcitonin (MIACALCIN) 200 UNIT/ACT nasal spray 1 spray by Nasal route daily        No current facility-administered medications for this visit.         Physical Exam:  /72   Pulse 70   Resp 16   Ht 5' 5.5\" (1.664 m)   Wt 173 lb (78.5 kg)   BMI 28.35 kg/m²   Wt Readings from Last 3 Encounters:   11/02/20 173 lb (78.5 kg)   09/30/20 171 lb (77.6 kg)   04/06/20 167 lb (75.8 kg)     Appearance: Awake, alert and oriented x 3, no acute respiratory distress  Skin: Intact, no rash  Head: Normocephalic, atraumatic  Eyes: EOMI, no conjunctival erythema  ENMT: No pharyngeal erythema, MMM, no rhinorrhea  Neck: Supple, no elevated JVP, no carotid bruits  Lungs: Clear to auscultation bilaterally. No wheezes, rales, or rhonchi.   Cardiac: Regular rate and rhythm, +S1S2, no murmurs apparent  Abdomen: Soft, nontender, +bowel sounds  Extremities: Moves all extremities x 4, trace to 1+ lower extremity edema right more than left  Neurologic: No focal motor deficits apparent, normal mood and affect, alert and oriented x 3  Peripheral Pulses: Intact posterior tibial pulses bilaterally    Laboratory Tests:  Lab Results   Component Value Date    CREATININE 0.9 05/05/2020    BUN 18 05/05/2020     05/05/2020    K 4.0 05/05/2020     05/05/2020    CO2 27 05/05/2020     No results found for: MG  Lab Results   Component Value Date    WBC 7.6 05/05/2020    HGB 14.7 05/05/2020    HCT 44.6 05/05/2020    MCV 94.5 05/05/2020     05/05/2020     Lab Results   Component Value Date    ALT 22 05/05/2020    AST 17 05/05/2020    ALKPHOS 69 05/05/2020    BILITOT 0.6 05/05/2020     Lab Results   Component Value Date    CKTOTAL 24 02/19/2020    CKMB 1.4 02/19/2020    TROPONINI <0.01 02/20/2020    TROPONINI <0.01 02/19/2020    TROPONINI <0.01 02/08/2014     Lab Results   Component Value Date    INR 1.0 08/10/2018    INR 1.0 02/07/2014    PROTIME 11.4 08/10/2018    PROTIME 11.8 02/07/2014     Lab Results   Component Value Date    TSH 0.060 (L) 05/05/2020     No results found for: LABA1C  No results found for: EAG  Lab Results   Component Value Date    CHOL 146 05/05/2020    CHOL 213 (H) 02/20/2020    CHOL 162 05/15/2012     Lab Results   Component Value Date    TRIG 108 05/05/2020    TRIG 103 02/20/2020    TRIG 111 05/15/2012     Lab Results   Component Value Date    HDL 63 05/05/2020    HDL 60 02/20/2020    HDL 46.0 05/15/2012     Lab Results   Component Value Date    LDLCALC 61 05/05/2020    LDLCALC 132 (H) 02/20/2020    LDLCALC 94 05/15/2012     Lab Results   Component Value Date    LABVLDL 22 05/05/2020    LABVLDL 21 02/20/2020     No results found for: CHOLHDREBECCA    Cardiac Tests:  ECG: normal sinus rhythm,  normal EKG. Echocardiogram: 8/15/2011-LVEF 32%, stage I diastolic dysfunction, mild TR. Stress test:  2/8/2014-no reversible ischemia, no defect or infarction. LVEF 77%. The ASCVD Risk score (Wendy Antonio, et al., 2013) failed to calculate for the following reasons: The 2013 ASCVD risk score is only valid for ages 36 to 78    Lipid panel done on 4/30/2019: Total cholesterol 164, LDL 88, HDL 61, triglycerides 74, renal functions were normal with a creatinine of 0.8 and CBC was normal.    ASSESSMENT:  1. Palpitations most likely secondary to premature atrial complexes well controlled on Toprol  2. Chest pain resolved after starting on isosorbide  3. Hyperlipidemia stable and on statins, LDL is well controlled at goal level. 4. History of TIA  5. Hypothyroidism on hormone replacement therapy  6. Peripheral edema right more than left rule out DVT. Plan:   1. Continue metoprolol 50 mg twice a day and no further work at this time. 2. Continue regular exercise   3. Schedule for a bilateral venous Dopplers to rule out DVT  4. We will discontinue Norvasc due to dizziness and peripheral edema. She was advised to monitor blood pressure at home on a regular basis at least once a day and call me if the blood pressure is more than 130/90.  5. She was advised to continue using compression stockings  6. Follow up with me in 3 months. The patient's current medication list, allergies, problem list and results of all previously ordered testing were reviewed at today's visit.   Jennifer Lamb MD  [facility]) Cardiology

## 2020-11-02 NOTE — TELEPHONE ENCOUNTER
Per Dr. Kacie Myles, patient needs LE Duplex U/S re: pain, swelling - R/O DVT. No auth required. Spoke with [facility] in [address] [place] [address]. B/L LE Duplex U/S scheduled for this morning ([address]). Patient to be notified to proceed now and they will fit her in; wear mask; bring list of meds/photo ID/ins cards. Patient notified of appt date/time/location/instructions.

## 2020-11-02 NOTE — PATIENT INSTRUCTIONS
1. Continue metoprolol 50 mg twice a day and no further work at this time. 2. Continue regular exercise   3. Schedule for bilateral venous Dopplers to rule out DVT  4. We will discontinue Norvasc due to dizziness and peripheral edema. She was advised to monitor blood pressure at home on a regular basis at least once a day and call me if the blood pressure is more than 130/90.  5. She was advised to continue using compression stockings  6. Follow up with me in 3 months.

## 2020-12-30 ENCOUNTER — OFFICE VISIT (OUTPATIENT)
Dept: ENT CLINIC | Age: 85
End: 2020-12-30
Payer: MEDICARE

## 2020-12-30 ENCOUNTER — PROCEDURE VISIT (OUTPATIENT)
Dept: AUDIOLOGY | Age: 85
End: 2020-12-30
Payer: MEDICARE

## 2020-12-30 VITALS
WEIGHT: 171.1 LBS | HEIGHT: 66 IN | RESPIRATION RATE: 20 BRPM | HEART RATE: 64 BPM | BODY MASS INDEX: 27.5 KG/M2 | SYSTOLIC BLOOD PRESSURE: 158 MMHG | DIASTOLIC BLOOD PRESSURE: 64 MMHG

## 2020-12-30 PROCEDURE — 99203 OFFICE O/P NEW LOW 30 MIN: CPT | Performed by: OTOLARYNGOLOGY

## 2020-12-30 PROCEDURE — 92557 COMPREHENSIVE HEARING TEST: CPT | Performed by: AUDIOLOGIST

## 2020-12-30 PROCEDURE — 92567 TYMPANOMETRY: CPT | Performed by: AUDIOLOGIST

## 2020-12-30 ASSESSMENT — ENCOUNTER SYMPTOMS
COUGH: 0
SWOLLEN GLANDS: 0
NAUSEA: 0
VISUAL CHANGE: 0
ABDOMINAL PAIN: 0
SORE THROAT: 0
VOMITING: 0

## 2020-12-30 NOTE — PROGRESS NOTES
Department of Otolaryngology  Office Consult Note  12/30/20      Subjective:      Patient ID: Jhoana Tsai is a 82 y.o. female. Chief Complaint   Patient presents with    New Patient    Ear Problem     tinnitis both ears        HPI: Patient presents as  new patient for tinnitis. States its in both ears. She was started on amlodipine in February of this year and noticed a \"hissing\" at that time. She denies hearing loss. She denies a loud occupation. She does not take ASA. She was also started on atorvastatin in February. She was taking off it in Nov and since has noticed it to be quieter. Ear Problem  This is a new problem. Episode onset: 10 months. The problem occurs constantly. The problem has been waxing and waning. Pertinent negatives include no abdominal pain, chest pain, chills, congestion, coughing, fever, headaches, nausea, neck pain, numbness, sore throat, swollen glands, vertigo, visual change, vomiting or weakness. Exacerbated by: amlodipine. She has tried nothing for the symptoms. The treatment provided no relief. Review of Systems   Constitutional: Negative for chills and fever. HENT: Negative for congestion and sore throat. Respiratory: Negative for cough. Cardiovascular: Negative for chest pain. Gastrointestinal: Negative for abdominal pain, nausea and vomiting. Musculoskeletal: Negative for neck pain. Neurological: Negative for vertigo, weakness, numbness and headaches. All other systems reviewed and are negative.         Past Medical History:   Diagnosis Date    Abdominal pain     Arthritis     l knee injections with dr David Ewing    Chest pain     resolved    Difficult intubation 1-    document on chart    Hypertension     Hypothyroidism     Migraine headache     Osteopenia     Palpitation     resolved    Plantar fasciitis     Skipped heart beats     Tingling of both feet     Tinnitus     hissing bilateral     Past Surgical History: Procedure Laterality Date    BREAST RECONSTRUCTION Bilateral 2004, 2007    COLONOSCOPY  10/13/2020    DILATION AND CURETTAGE OF UTERUS      EYE SURGERY Bilateral     cataract removal    INCONTINENCE SURGERY      MASTECTOMY Bilateral 1982    for fibrocystic breast disease; has saline implants    [place] ENDOVENOUS RF, 1ST VEIN Right 8/10/2018    RIGHT GREASTER SAPHENOUS VEIN STAB PHLEBECTOMIES performed by Damian [facility] MD at [facility]         Current Outpatient Medications:     atorvastatin (LIPITOR) 20 MG tablet, Take 1 tablet by mouth daily, Disp: 90 tablet, Rfl: 3    isosorbide mononitrate (IMDUR) 30 MG extended release tablet, Take 1 tablet by mouth daily, Disp: 30 tablet, Rfl: 11    metoprolol succinate (TOPROL XL) 50 MG extended release tablet, Take 1 tablet by mouth 2 times daily, Disp: 180 tablet, Rfl: 3    calcitonin (MIACALCIN) 200 UNIT/ACT nasal spray, 1 spray by Nasal route daily , Disp: , Rfl:     Calcium-Magnesium-Vitamin D (CITRACAL CALCIUM+D PO), Take 600 mg by mouth 2 times daily , Disp: , Rfl:     vitamin D (CHOLECALCIFEROL) 1000 UNIT TABS tablet, Take 1,000 Int'l Units by mouth daily Takes two tabs daily, Disp: , Rfl:     levothyroxine (SYNTHROID) 75 MCG tablet, Take 75 mcg by mouth Daily , Disp: , Rfl:     amitriptyline (ELAVIL) 25 MG tablet, Take 25 mg by mouth nightly , Disp: , Rfl:     acetaminophen (TYLENOL) 650 MG suppository, Take by mouth, Disp: , Rfl:   Iv dye [iodides], Pcn [penicillins], Aspirin, Bactrim, Drixoral [dexbrompheniramine-pseudoeph], Morphine, and Sulfamethoxazole-trimethoprim  Social History     Tobacco Use    Smoking status: Never Smoker    Smokeless tobacco: Never Used   Substance Use Topics    Alcohol use: No    Drug use: No     Family History   Problem Relation Age of Onset    Heart Disease Father            Objective: BP (!) 158/64   Pulse 64   Resp 20   Ht 5' 5.5\" (1.664 m)   Wt 171 lb 1.6 oz (77.6 kg)   Breastfeeding No   BMI 28.04 kg/m²     Physical Exam  Constitutional:       General: She is not in acute distress. Appearance: Normal appearance. She is not ill-appearing or diaphoretic. HENT:      Head: Normocephalic and atraumatic. Right Ear: Tympanic membrane, ear canal and external ear normal.      Left Ear: Tympanic membrane, ear canal and external ear normal.      Nose: Nose normal. No congestion or rhinorrhea. Mouth/Throat:      Mouth: Mucous membranes are moist.      Pharynx: Oropharynx is clear. No oropharyngeal exudate or posterior oropharyngeal erythema. Eyes:      General:         Right eye: No discharge. Left eye: No discharge. Extraocular Movements: Extraocular movements intact. Conjunctiva/sclera: Conjunctivae normal.      Pupils: Pupils are equal, round, and reactive to light. Cardiovascular:      Rate and Rhythm: Normal rate. Pulses: Normal pulses. Pulmonary:      Effort: Pulmonary effort is normal.   Lymphadenopathy:      Cervical: No cervical adenopathy. Skin:     General: Skin is warm and dry. Neurological:      Mental Status: She is alert. Assessment:       Diagnosis Orders   1. Tinnitus of both ears     2. Presbycusis of both ears             Plan:      Audiology results discussed with patient. He symptoms are likely age-related. We discussed preventative measures for her tinnitus. Etiology is unlikely medication related as incidence is <1%. The patient will schedule a follow up appointment with our office PRN.       Electronically signed by Shahana Ye DO on 12/30/20 at10:15 AM ANIVAL [place]  1935 I have discussed the case, including pertinent history and exam findings with the resident. I have seen and examined the patient and the key elements of the encounter have been performed by me. I agree with the assessment, plan and orders as documented by the resident. Patient here for follow up of medical problems. Remainder of medical problems as per resident note.       [id number] [facility], DO  12/30/20

## 2021-01-28 ENCOUNTER — OFFICE VISIT (OUTPATIENT)
Dept: NEUROLOGY | Age: 86
End: 2021-01-28
Payer: MEDICARE

## 2021-01-28 VITALS
WEIGHT: 168 LBS | HEIGHT: 65 IN | DIASTOLIC BLOOD PRESSURE: 60 MMHG | TEMPERATURE: 96.3 F | BODY MASS INDEX: 27.99 KG/M2 | SYSTOLIC BLOOD PRESSURE: 112 MMHG

## 2021-01-28 DIAGNOSIS — R20.2 NUMBNESS AND TINGLING: Primary | ICD-10-CM

## 2021-01-28 DIAGNOSIS — R20.0 NUMBNESS AND TINGLING: Primary | ICD-10-CM

## 2021-01-28 DIAGNOSIS — M54.17 L-S RADICULOPATHY: ICD-10-CM

## 2021-01-28 DIAGNOSIS — M48.061 SPINAL STENOSIS OF LUMBAR REGION WITHOUT NEUROGENIC CLAUDICATION: ICD-10-CM

## 2021-01-28 PROCEDURE — 99203 OFFICE O/P NEW LOW 30 MIN: CPT | Performed by: PHYSICIAN ASSISTANT

## 2021-01-28 RX ORDER — GABAPENTIN 100 MG/1
100 CAPSULE ORAL 3 TIMES DAILY
Qty: 90 CAPSULE | Refills: 0 | Status: SHIPPED
Start: 2021-01-28 | End: 2021-03-03 | Stop reason: SDUPTHER

## 2021-01-28 NOTE — PROGRESS NOTES
[address] [facility]. Jaime Powers M.D., F.A.C.P. Rosalio Leung, DNP, APRN, ACNS-BC  Yamel Grissom. Ever Higuera, MSN, APRN-FNP-C  FLORIDALMA Aguero, PAMarioC  Romayne Dapper, MSN, APRN-FNP-C  [address], [facility]  [place], [address] [address]  Phone: [phone]  Fax: [phone]       Jenny Carreon is a 82 y.o. left handed female       Past Medical History:     Past Medical History:   Diagnosis Date    Abdominal pain     Arthritis     l knee injections with dr Marie Stanford    Chest pain     resolved    Difficult intubation 1-    document on chart    Hypertension     Hypothyroidism     Migraine headache     Osteopenia     Palpitation     resolved    Plantar fasciitis     Skipped heart beats     Tingling of both feet     Tinnitus     hissing bilateral       Past Surgical History:       Past Surgical History:   Procedure Laterality Date    BREAST RECONSTRUCTION Bilateral 2004, 2007    COLONOSCOPY  10/13/2020    DILATION AND CURETTAGE OF UTERUS      EYE SURGERY Bilateral     cataract removal    INCONTINENCE SURGERY      MASTECTOMY Bilateral 1982    for fibrocystic breast disease; has saline implants    DC ENDOVENOUS RF, 1ST VEIN Right 8/10/2018    RIGHT GREASTER SAPHENOUS VEIN STAB PHLEBECTOMIES performed by Yessy Patterson MD at [facility] 22 TONSILLECTOMY         Allergies: Iv dye [iodides], Pcn [penicillins], Aspirin, Bactrim, Drixoral [dexbrompheniramine-pseudoeph], Morphine, and Sulfamethoxazole-trimethoprim    Medications:     Prior to Admission medications    Medication Sig Start Date End Date Taking?  Authorizing Provider   isosorbide mononitrate (IMDUR) 30 MG extended release tablet Take 1 tablet by mouth daily 5/4/20  Yes Lennox Heckler, MD   acetaminophen (TYLENOL) 650 MG suppository Take by mouth 2/20/20  Yes Historical Provider, MD   metoprolol succinate (TOPROL XL) 50 MG extended release tablet Take 1 tablet by mouth 2 times daily 4/6/20  Yes Vance Logan MD   calcitonin (MIACALCIN) 200 UNIT/ACT nasal spray 1 spray by Nasal route daily  4/6/16  Yes Historical Provider, MD   Calcium-Magnesium-Vitamin D (CITRACAL CALCIUM+D PO) Take 600 mg by mouth 2 times daily    Yes Historical Provider, MD   vitamin D (CHOLECALCIFEROL) 1000 UNIT TABS tablet Take 1,000 Int'l Units by mouth daily Takes two tabs daily   Yes Historical Provider, MD   levothyroxine (SYNTHROID) 75 MCG tablet Take 75 mcg by mouth Daily    Yes Historical Provider, MD   amitriptyline (ELAVIL) 25 MG tablet Take 25 mg by mouth nightly    Yes Historical Provider, MD   atorvastatin (LIPITOR) 20 MG tablet Take 1 tablet by mouth daily  Patient not taking: Reported on 1/28/2021 11/2/20   Vance Logna MD       Social History:        reports that she has never smoked. She has never used smokeless tobacco. She reports that she does not drink alcohol or use drugs. She is a retired RN  Review of Systems:     No chest pain or palpitations  No SOB  No vertigo, lightheadedness or loss of consciousness  No falls, tripping or stumbling  No incontinence of bowels or bladder  No itching or bruising appreciated  No focal arm/leg weakness  No back pain, neck pain   No shooting pains in legs or arms   + numbness/tingling/tightness of BLE and occasionally hands    ROS is otherwise negative    Family History:     Family History   Problem Relation Age of Onset    Heart Disease Father         History of Present Illness: The patient presents for additional evaluation of numbness and tingling of the BLE. She is an excellent historian. She first mentioned her symptoms to her PCP in 8/2020. She feels that they started some months before that. She reports numbness, tingling and tightness sensation in her feet that has been progressively worsening over the months -- now involving more of her lower extremities and sometimes her hands. Sitting on a hard surface aggravates her symptoms.  Getting up and moving improves them. She denied weakness, balance difficulties, falls, back pain, neck pain or shooting pains. She was initially sent to vascular for her symptoms and reports her testing was all unrevealing. She then had a MRI of her lumbar spine a couple weeks ago which revealed moderate central canal stenosis and neuroforaminal stenosis. She was subsequently referred to neurology for further evaluation. She currently does not take anything for her symptoms, but was on gabapentin in the past for unsure reason which worked well. Objective:       /60 (Site: Right Upper Arm)   Temp 96.3 °F (35.7 °C)   Ht 5' 5\" (1.651 m)   Wt 168 lb (76.2 kg)   BMI 27.96 kg/m²     General appearance: alert, appears stated age, cooperative and in no distress  Head: normocephalic, without obvious abnormality, atraumatic  Eyes: conjunctivae/corneas clear; no drainage  Neck: Limited ROM. No cervicalgia or spasms   Lungs: clear to auscultation bilaterally  Heart: regular rate and rhythm, S1, S2 normal, no murmur  Extremities: normal, atraumatic, no cyanosis or edema  Pulses: 2+ and symmetric  Skin:  color, texture, turgor normal--no rashes or lesions      Mental Status: alert and oriented.  Thought content appropriate     Appropriate attention/concentration  Intact fundus of knowledge  Repetition intact  Memories intact    Speech: no dysarthria  Language: no aphasias    Cranial Nerves:  I: smell    II: visual acuity     II: visual fields Full    II: pupils NENITA   III,VII: ptosis None   III,IV,VI: extraocular muscles  EOMI without nystagmus   V: mastication Normal   V: facial light touch sensation  Normal   V,VII: corneal reflex     VII: facial muscle function - upper  Normal   VII: facial muscle function - lower Normal   VIII: hearing Normal   IX: soft palate elevation  Normal   IX,X: gag reflex    XI: trapezius strength  5/5   XI: sternocleidomastoid strength 5/5   XI: neck extension strength  5/5   XII: tongue strength  Normal Motor:  5/5 throughout  Normal bulk and tone  No drift   No abnormal movements    Sensory:  LT normal in all extremities   PP decreased in stocking distribution BLE -- normal in arms   Vibration moderately decreased L ankle; markedly R ankle     Coordination:   FN, FFM and MICKY normal  HS normal    Gait:  Normal  Romberg's negative    DTR:   Right Brachioradialis reflex 2+  Left Brachioradialis reflex 2+  Right Biceps reflex 2+  Left Biceps reflex 2+  Right Quadriceps reflex 2+  Left Quadriceps reflex 2+  Right Achilles reflex 1+  Left Achilles reflex 1+    No Babinskis  No Sanchez's    No other pathological reflexes    Laboratory/Radiology:  ry/Radiology: There are no current labs for me to review     MRI L spine report scanned in epic -- moderate central canal stenosis and neuroforaminal narrowing     All labs and images were personally reviewed at the time of this visit    Assessment:     82year old woman with c/o numbness and tingling in the BLE   Based on imaging of lumbar spine with moderate central canal stenosis and neuroforaminal narrowing and c/o worsened sx when sitting on a hard surface for a period of time, I suspect a LS radiculopathy causing her symptoms-- will sent to PT and refer to NSGY    Now with progression to involve the hands, cannot r/o underlying peripheral neuropathy as well and will obtain EMG for evaluation. Possibly from her thyroid disease?  -- will also request copy of recent blood work to review     Plan:     EMG    Obtain labs from PCP   Will add on depending what has been completed     Referral to [address] [place] [address]    Referral to PT     Gabapentin 100 mg TID    Per patient preference, Patient will be scheduled f/u with physician     Call with questions or concerns     Paula Roberts PA-C  9:55 AM  1/28/2021    I spent 35 minutes with this patient obtaining the HPI and discussing the exam with greater than 50% of the time providing counseling and education on medications and other treatment plans. All questions were answered prior to leaving my office.

## 2021-02-02 ENCOUNTER — OFFICE VISIT (OUTPATIENT)
Dept: NEUROLOGY | Age: 86
End: 2021-02-02
Payer: MEDICARE

## 2021-02-02 VITALS
RESPIRATION RATE: 16 BRPM | OXYGEN SATURATION: 97 % | HEART RATE: 65 BPM | DIASTOLIC BLOOD PRESSURE: 62 MMHG | HEIGHT: 65 IN | BODY MASS INDEX: 28.16 KG/M2 | WEIGHT: 169 LBS | SYSTOLIC BLOOD PRESSURE: 151 MMHG | TEMPERATURE: 97.8 F

## 2021-02-02 DIAGNOSIS — R20.2 NUMBNESS AND TINGLING: ICD-10-CM

## 2021-02-02 DIAGNOSIS — R20.0 NUMBNESS AND TINGLING: ICD-10-CM

## 2021-02-02 PROCEDURE — 95912 NRV CNDJ TEST 11-12 STUDIES: CPT | Performed by: PSYCHIATRY & NEUROLOGY

## 2021-02-02 PROCEDURE — 95886 MUSC TEST DONE W/N TEST COMP: CPT | Performed by: PSYCHIATRY & NEUROLOGY

## 2021-03-03 ENCOUNTER — OFFICE VISIT (OUTPATIENT)
Dept: NEUROLOGY | Age: 86
End: 2021-03-03
Payer: MEDICARE

## 2021-03-03 VITALS
SYSTOLIC BLOOD PRESSURE: 160 MMHG | TEMPERATURE: 95.8 F | OXYGEN SATURATION: 99 % | BODY MASS INDEX: 28.32 KG/M2 | DIASTOLIC BLOOD PRESSURE: 67 MMHG | HEART RATE: 73 BPM | WEIGHT: 170 LBS | HEIGHT: 65 IN | RESPIRATION RATE: 16 BRPM

## 2021-03-03 DIAGNOSIS — R20.0 NUMBNESS AND TINGLING: Primary | ICD-10-CM

## 2021-03-03 DIAGNOSIS — M54.16 LUMBAR RADICULOPATHY: ICD-10-CM

## 2021-03-03 DIAGNOSIS — R20.2 NUMBNESS AND TINGLING: Primary | ICD-10-CM

## 2021-03-03 DIAGNOSIS — G62.9 SENSORY NEUROPATHY: ICD-10-CM

## 2021-03-03 DIAGNOSIS — G56.01 CARPAL TUNNEL SYNDROME OF RIGHT WRIST: ICD-10-CM

## 2021-03-03 PROBLEM — E11.9 TYPE 2 DIABETES MELLITUS WITHOUT COMPLICATION, WITHOUT LONG-TERM CURRENT USE OF INSULIN (HCC): Status: ACTIVE | Noted: 2021-03-03

## 2021-03-03 PROCEDURE — 99204 OFFICE O/P NEW MOD 45 MIN: CPT | Performed by: PSYCHIATRY & NEUROLOGY

## 2021-03-03 RX ORDER — GABAPENTIN 100 MG/1
100 CAPSULE ORAL 3 TIMES DAILY
Qty: 90 CAPSULE | Refills: 0 | Status: SHIPPED
Start: 2021-03-03 | End: 2021-03-10 | Stop reason: DRUGHIGH

## 2021-03-03 RX ORDER — M-VIT,TX,IRON,MINS/CALC/FOLIC 27MG-0.4MG
1 TABLET ORAL DAILY
COMMUNITY

## 2021-03-03 ASSESSMENT — ENCOUNTER SYMPTOMS
PHOTOPHOBIA: 0
SHORTNESS OF BREATH: 0
TROUBLE SWALLOWING: 0
VOMITING: 0
NAUSEA: 0

## 2021-03-03 NOTE — PROGRESS NOTES
MASTECTOMY Bilateral 1982    for fibrocystic breast disease; has saline implants    MI ENDOVENOUS RF, 1ST VEIN Right 8/10/2018    RIGHT GREASTER SAPHENOUS VEIN STAB PHLEBECTOMIES performed by Samy Geronimo MD at [address] [place] [address]:   Family History   Problem Relation Age of Onset    Heart Disease Father      SOCIAL HISTORY:   Social History     Socioeconomic History    Marital status:      Spouse name: None    Number of children: None    Years of education: None    Highest education level: None   Occupational History    None   Social Needs    Financial resource strain: None    Food insecurity     Worry: None     Inability: None    Transportation needs     Medical: None     Non-medical: None   Tobacco Use    Smoking status: Never Smoker    Smokeless tobacco: Never Used   Substance and Sexual Activity    Alcohol use: No    Drug use: No    Sexual activity: None   Lifestyle    Physical activity     Days per week: None     Minutes per session: None    Stress: None   Relationships    Social connections     Talks on phone: None     Gets together: None     Attends Mosque service: None     Active member of club or organization: None     Attends meetings of clubs or organizations: None     Relationship status: None    Intimate partner violence     Fear of current or ex partner: None     Emotionally abused: None     Physically abused: None     Forced sexual activity: None   Other Topics Concern    None   Social History Narrative    None      E-Cigarettes/Vaping Use     Questions Responses    E-Cigarette/Vaping Use Never User    Start Date     Passive Exposure     Quit Date     Counseling Given     Comments          Allergy:   Allergies   Allergen Reactions    Iv Dye [Iodides] Hives    Pcn [Penicillins] Rash    Aspirin Other (See Comments)     tingling    Bactrim Other (See Comments)     Yeast infection    Drixoral [Dexbrompheniramine-Pseudoeph] Other (See Comments)     hyper    Morphine Other (See Comments)     Iv line vein reddened    Sulfamethoxazole-Trimethoprim Other (See Comments)     MEDS:   Current Outpatient Medications:     Multiple Vitamins-Minerals (THERAPEUTIC MULTIVITAMIN-MINERALS) tablet, Take 1 tablet by mouth daily, Disp: , Rfl:     gabapentin (NEURONTIN) 100 MG capsule, Take 1 capsule by mouth 3 times daily for 30 days. Intended supply: 30 days, Disp: 90 capsule, Rfl: 0    isosorbide mononitrate (IMDUR) 30 MG extended release tablet, Take 1 tablet by mouth daily, Disp: 30 tablet, Rfl: 11    metoprolol succinate (TOPROL XL) 50 MG extended release tablet, Take 1 tablet by mouth 2 times daily, Disp: 180 tablet, Rfl: 3    calcitonin (MIACALCIN) 200 UNIT/ACT nasal spray, 1 spray by Nasal route daily , Disp: , Rfl:     Calcium-Magnesium-Vitamin D (CITRACAL CALCIUM+D PO), Take 600 mg by mouth 2 times daily , Disp: , Rfl:     vitamin D (CHOLECALCIFEROL) 1000 UNIT TABS tablet, Take 1,000 Int'l Units by mouth daily Takes two tabs daily, Disp: , Rfl:     levothyroxine (SYNTHROID) 75 MCG tablet, Take 75 mcg by mouth Daily , Disp: , Rfl:     amitriptyline (ELAVIL) 25 MG tablet, Take 25 mg by mouth nightly , Disp: , Rfl:     acetaminophen (TYLENOL) 650 MG suppository, Take by mouth, Disp: , Rfl:     REVIEW OF SYSTEMS  Review of Systems   Constitutional: Negative for appetite change, fatigue and unexpected weight change. HENT: Negative for drooling, hearing loss, tinnitus and trouble swallowing. Eyes: Negative for photophobia and visual disturbance. Respiratory: Negative for shortness of breath. Cardiovascular: Negative for palpitations. Gastrointestinal: Negative for nausea and vomiting. Endocrine: Negative for polyuria. Genitourinary: Negative for flank pain. Musculoskeletal: Negative for neck pain and neck stiffness. Skin: Negative for rash. Allergic/Immunologic: Negative for food allergies.    Neurological: Positive for E9/L Final     Hemoglobin   Date Value Ref Range Status   05/05/2020 14.7 11.5 - 15.5 g/dL Final   02/19/2020 13.4 11.5 - 15.5 g/dL Final   08/10/2018 15.3 11.5 - 15.5 g/dL Final     Hematocrit   Date Value Ref Range Status   05/05/2020 44.6 34.0 - 48.0 % Final   02/19/2020 40.8 34.0 - 48.0 % Final   08/10/2018 43.8 34.0 - 48.0 % Final     Platelets   Date Value Ref Range Status   05/05/2020 192 130 - 450 E9/L Corrected     Comment:     Platelet clumps are identified. This may decrease the automated platelet  count artifactually.      02/19/2020 227 130 - 450 E9/L Final   08/10/2018 219 130 - 450 E9/L Final     Neutrophils %   Date Value Ref Range Status   05/05/2020 63.0 43.0 - 80.0 % Final   02/19/2020 58.0 43.0 - 80.0 % Final     Monocytes %   Date Value Ref Range Status   05/05/2020 11.0 2.0 - 12.0 % Final   02/19/2020 11.4 2.0 - 12.0 % Final     Monocytes   Date Value Ref Range Status   05/15/2012 8 2 - 12 % Final   05/03/2011 9 2 - 12 % Final     Total Protein   Date Value Ref Range Status   05/05/2020 6.6 6.4 - 8.3 g/dL Final   05/15/2012 6.1 5.7 - 8.2 g/dL Final   05/03/2011 6.5 5.7 - 8.2 g/dL Final     Total Bilirubin   Date Value Ref Range Status   05/05/2020 0.6 0.0 - 1.2 mg/dL Final     Bilirubin, Total   Date Value Ref Range Status   05/15/2012 0.4 0.3 - 1.2 mg/dL Final   05/03/2011 0.5 0.3 - 1.2 mg/dL Final     Alkaline Phosphatase   Date Value Ref Range Status   05/05/2020 69 35 - 104 U/L Final   05/15/2012 97 45 - 129 U/L Final   05/03/2011 87 45 - 129 U/L Final     ALT   Date Value Ref Range Status   05/05/2020 22 0 - 32 U/L Final   05/15/2012 14 10 - 49 U/L Final   05/03/2011 25 10 - 49 U/L Final     AST   Date Value Ref Range Status   05/05/2020 17 0 - 31 U/L Final   05/15/2012 19 0 - 33 U/L Final   05/03/2011 27 0 - 33 U/L Final     Lab Results   Component Value Date    INR 1.0 08/10/2018     Lab Results   Component Value Date    TRIG 108 05/05/2020    HDL 63 05/05/2020     No components found for: HGBA1C  No results found for: PROTEINCSF, GLUCCSF, WBCCSF    Controlled Substance Monitoring:    Acute and Chronic Pain Monitoring:   No flowsheet data found. MEDICAL DECISION MAKING  ASSESSMENT/PLAN    Larissa Parker was seen today for numbness. Diagnoses and all orders for this visit:    Numbness and tingling    Lumbar radiculopathy    Carpal tunnel syndrome of right wrist    Sensory neuropathy    -     Vitamin B12 & Folate; Future  -     Vitamin B1; Future  -     Sjogren's Ab (SS-A, SS-B); Future  -     Electrophoresis Protein, Serum without Reflex to Immunofixation; Future  -     Methylmalonic Acid, Serum; Future  -     Hemoglobin A1C; Future  -     C-Reactive Protein; Future  -     CK; Future  -     RADHA; Future  -     Sedimentation Rate; Future  -     Rheumatoid Factor; Future    · The patient is coming in with complaints of numbness and tingling started about 5 years ago in her bilateral feet. She also has tingling and numbness in her hands. Reports that the symptoms radiate to her thighs and legs when she is sitting for long period of time or whenever she is bending down. EMG showed bilateral S1 radiculopathy, sensory neuropathy and right sided mild carpal tunnel syndrome. The patient had MRI lumbar spine which showed multilevel degenerative disc disease with moderate stenosis L3-L4 level and bilateral foraminal stenosis at L4-L5 L5-S1 levels. At this time her underlying symptoms appears to be a combination of her lumbosacral radiculopathy and peripheral neuropathy. For her lumbosacral radiculopathy the patient is currently doing physical therapy and scheduled to see neurosurgery. For the underlying sensory polyneuropathy, check neuropathy panel. Okay to continue with gabapentin. Etiology of the neuropathy is currently undetermined however she has longstanding venous sufficiency and it is possible that that could be playing a role in it.       Follow-up as needed    Thank you for involving me in

## 2021-03-04 ENCOUNTER — INITIAL CONSULT (OUTPATIENT)
Dept: NEUROSURGERY | Age: 86
End: 2021-03-04
Payer: MEDICARE

## 2021-03-04 VITALS
WEIGHT: 170 LBS | DIASTOLIC BLOOD PRESSURE: 53 MMHG | HEIGHT: 65 IN | BODY MASS INDEX: 28.32 KG/M2 | TEMPERATURE: 97.1 F | SYSTOLIC BLOOD PRESSURE: 123 MMHG | HEART RATE: 69 BPM

## 2021-03-04 DIAGNOSIS — M48.061 SPINAL STENOSIS OF LUMBAR REGION WITHOUT NEUROGENIC CLAUDICATION: Primary | ICD-10-CM

## 2021-03-04 PROCEDURE — 99204 OFFICE O/P NEW MOD 45 MIN: CPT | Performed by: PHYSICIAN ASSISTANT

## 2021-03-04 ASSESSMENT — ENCOUNTER SYMPTOMS
RESPIRATORY NEGATIVE: 1
GASTROINTESTINAL NEGATIVE: 1
ALLERGIC/IMMUNOLOGIC NEGATIVE: 1
EYES NEGATIVE: 1

## 2021-03-04 NOTE — PROGRESS NOTES
Subjective:      Patient ID: Temo Gresham is a 82 y.o. female. Leg Pain   Incident onset: 6 months of bilateral leg tingling while sitting. no back or leg pain. There was no injury mechanism. The pain is at a severity of 0/10. The patient is experiencing no pain. Treatments tried: gabapentin. The treatment provided mild relief. Review of Systems   Constitutional: Negative. HENT: Negative. Eyes: Negative. Respiratory: Negative. Cardiovascular: Negative. Gastrointestinal: Negative. Endocrine: Negative. Genitourinary: Negative. Musculoskeletal: Negative. Skin: Negative. Allergic/Immunologic: Negative. Neurological: Negative. Hematological: Negative. Psychiatric/Behavioral: Negative. Objective:   Physical Exam  Constitutional:       Appearance: Normal appearance. HENT:      Head: Normocephalic and atraumatic. Mouth/Throat:      Mouth: Mucous membranes are moist.   Eyes:      Pupils: Pupils are equal, round, and reactive to light. Pulmonary:      Effort: Pulmonary effort is normal.   Abdominal:      General: There is no distension. Skin:     General: Skin is warm and dry. Neurological:      Mental Status: She is alert. GCS: GCS eye subscore is 4. GCS verbal subscore is 5. GCS motor subscore is 6. Cranial Nerves: Cranial nerves are intact. Sensory: Sensation is intact. Motor: Motor function is intact. Gait: Gait is intact. Deep Tendon Reflexes: Reflexes are normal and symmetric. Psychiatric:         Mood and Affect: Mood normal.         Assessment:      82year old female with numbness in both legs only while sitting in a chair. No back or leg pain. Lumbar MRI reveals L3-4, L4-5, and L5-S1 DDD and foraminal stenosis. LE EMG suggests bilateral lumbar radiculopathies and peripheral neuropathy. Plan: We will refer for diagnostic lumbar JESENIA. She may continue with PT.           JOSÉ ANTONIO Wilkerson

## 2021-03-05 DIAGNOSIS — R20.0 NUMBNESS AND TINGLING: ICD-10-CM

## 2021-03-05 DIAGNOSIS — R20.2 NUMBNESS AND TINGLING: ICD-10-CM

## 2021-03-05 LAB
C-REACTIVE PROTEIN: 0.3 MG/DL (ref 0–0.4)
FOLATE: >20 NG/ML (ref 4.8–24.2)
HBA1C MFR BLD: 5.9 % (ref 4–5.6)
RHEUMATOID FACTOR: <10 IU/ML (ref 0–13)
SEDIMENTATION RATE, ERYTHROCYTE: 14 MM/HR (ref 0–20)
TOTAL CK: 83 U/L (ref 20–180)
VITAMIN B-12: 406 PG/ML (ref 211–946)

## 2021-03-08 ENCOUNTER — TELEPHONE (OUTPATIENT)
Dept: NEUROLOGY | Age: 86
End: 2021-03-08

## 2021-03-08 DIAGNOSIS — D47.2 MGUS (MONOCLONAL GAMMOPATHY OF UNKNOWN SIGNIFICANCE): Primary | ICD-10-CM

## 2021-03-08 LAB
ALBUMIN SERPL-MCNC: 3.1 G/DL (ref 3.5–4.7)
ALPHA-1-GLOBULIN: 0.2 G/DL (ref 0.2–0.4)
ALPHA-2-GLOBULIN: 1 G/FL (ref 0.5–1)
ANTI-NUCLEAR ANTIBODY (ANA): NEGATIVE
BETA GLOBULIN: 0.9 G/DL (ref 0.8–1.3)
ELECTROPHORESIS: ABNORMAL
GAMMA GLOBULIN: 1 G/DL (ref 0.7–1.6)
IGA: 118 MG/DL (ref 70–400)
IGG: 779 MG/DL (ref 700–1600)
IGM: 106 MG/DL (ref 40–230)
IMMUNOFIXATION RESULT, SERUM: NORMAL
TOTAL PROTEIN: 6.2 G/DL (ref 6.4–8.3)

## 2021-03-08 NOTE — TELEPHONE ENCOUNTER
----- Message from Eleni Guillaume MD sent at 3/8/2021 10:28 AM EST -----  Please inform the patient that her vitamin B12 rheumatoid factor CRP and ESR levels are normal.  Her hemoglobin A1c is mildly elevated to 5.9, nothing to do from neurological standpoint, follow-up with primary care physician.

## 2021-03-08 NOTE — TELEPHONE ENCOUNTER
Spoke with patient and informed her that her vitamin B12 rheumatoid factor CRP and ESR levels are normal.   Her hemoglobin A1c is mildly elevated to 5.9, nothing to do from neurological standpoint, follow-up with primary care physician.

## 2021-03-09 ENCOUNTER — TELEPHONE (OUTPATIENT)
Dept: NEUROLOGY | Age: 86
End: 2021-03-09

## 2021-03-09 LAB
ENA TO SSA (RO) ANTIBODY: NEGATIVE
ENA TO SSB (LA) ANTIBODY: NEGATIVE

## 2021-03-09 NOTE — TELEPHONE ENCOUNTER
Spoke with patient and informed her that her SPEP and immunofixation profile shows increased protein in her blood. This needs further evaluation by hematology. Dr Jody Restrepo will refer to heme-onc. Patient understood.

## 2021-03-09 NOTE — TELEPHONE ENCOUNTER
----- Message from Britt Hatchet, MD sent at 3/8/2021  4:20 PM EST -----  Her SPEP and immunofixation profile shows increased protein in her blood. This needs further evaluation by hematology. We will refer to heme-onc.

## 2021-03-10 ENCOUNTER — OFFICE VISIT (OUTPATIENT)
Dept: PAIN MANAGEMENT | Age: 86
End: 2021-03-10
Payer: MEDICARE

## 2021-03-10 VITALS
WEIGHT: 170 LBS | DIASTOLIC BLOOD PRESSURE: 68 MMHG | HEIGHT: 65 IN | RESPIRATION RATE: 16 BRPM | HEART RATE: 75 BPM | SYSTOLIC BLOOD PRESSURE: 132 MMHG | BODY MASS INDEX: 28.32 KG/M2 | OXYGEN SATURATION: 99 % | TEMPERATURE: 96.9 F

## 2021-03-10 DIAGNOSIS — M51.9 LUMBAR DISC DISORDER: ICD-10-CM

## 2021-03-10 DIAGNOSIS — M79.2 NEURALGIA AND NEURITIS: ICD-10-CM

## 2021-03-10 DIAGNOSIS — M47.816 LUMBAR SPONDYLOSIS: Primary | ICD-10-CM

## 2021-03-10 DIAGNOSIS — M47.816 LUMBAR FACET ARTHROPATHY: ICD-10-CM

## 2021-03-10 DIAGNOSIS — G89.4 CHRONIC PAIN SYNDROME: ICD-10-CM

## 2021-03-10 DIAGNOSIS — M48.061 SPINAL STENOSIS OF LUMBAR REGION WITHOUT NEUROGENIC CLAUDICATION: ICD-10-CM

## 2021-03-10 LAB
METHYLMALONIC ACID: 0.21 UMOL/L (ref 0–0.4)
VITAMIN B1 WHOLE BLOOD: 160 NMOL/L (ref 70–180)

## 2021-03-10 PROCEDURE — 99204 OFFICE O/P NEW MOD 45 MIN: CPT | Performed by: PAIN MEDICINE

## 2021-03-10 RX ORDER — GABAPENTIN 300 MG/1
300 CAPSULE ORAL 2 TIMES DAILY
Qty: 40 CAPSULE | Refills: 0 | Status: SHIPPED
Start: 2021-03-10 | End: 2021-03-23 | Stop reason: SDUPTHER

## 2021-03-10 NOTE — PROGRESS NOTES
Do you currently have any of the following:    Fever: No  Headache:  No  Cough: No  Shortness of breath: No  Exposed to anyone with these symptoms: No                                                                                                                Lisa Danker presents to the [address] [address] on 3/10/2021. Waldemar Bridges is complaining of pain in both legs and feet. . The pain is constant. The pain is described as tightness around ankles, numbness, and tingling in legs and feet. . Pain is rated on her best day at a 2 on her worst day at a 5, and on average at a 4 on the VAS scale. She took her last dose of Neurontin this AM. Waldemar Bridges does not have issues with constipation. Any procedures since your last visit: No,    She is not on NSAIDS and  is not on anticoagulation medications to include none and is managed by NA. Pacemaker or defibrillator: No Physician managing device is NA. Medication Contract and Consent for Opioid Use Documents Filed      No documents found                   /68   Pulse 75   Temp 96.9 °F (36.1 °C) (Infrared)   Resp 16   Ht 5' 5\" (1.651 m)   Wt 170 lb (77.1 kg)   SpO2 99%   BMI 28.29 kg/m²      No LMP recorded.  Patient is postmenopausal.

## 2021-03-10 NOTE — PROGRESS NOTES
[address]        [address] [facility], [address] [facility]      [phone]          Consult Note      Patient:  DEVIN Dawkins 1935    Date of Service:  3/10/21    Requesting Physician:  JOSÉ ANTONIO Rosenthal    Reason for Consult:      Patient presents with complaints of bilateral leg pain that started 2020 and has been progressively getting worse. HISTORY OF PRESENT ILLNESS:      Pain does not radiate. She  has numbness, tingling of the both lower extremities and does not have bladder or bowel dysfunction. She has not been on anticoagulation medications to include none. The patient  has not been on herbal supplements. The patient is not diabetic. Imaging:   Lumbar spine MRI   Multilevel lumbar spondylosis most pronounced at L3-4, there is diffuse disc bulge with facet hypertrophy resulting in moderate central canal and bilateral foraminal narrowing. Mild right foraminal narrowing at L2-3 and mild bilateral foraminal narrowing at L4-5 and L5-S1. 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     The study also reveals evidence of chronic neurogenic changes in the right S1 myotome indicative of a right chronic S1 radiculopathy. Previous treatments: Physical Therapy and medications. .      Opioid Agreement:  Date enacted:N/A  Renewal date:N/A    Past Medical History:   Diagnosis Date    Abdominal pain     Arthritis     l knee injections with dr Duncan Arms    Chest pain     resolved    Difficult intubation 2007    document on chart    Hypertension     Hypothyroidism     Migraine headache     Osteopenia     Palpitation     resolved    Plantar fasciitis     Skipped heart beats     Tingling of both feet     Tinnitus     hissing bilateral     Past Surgical History:   Procedure Laterality Date    BREAST RECONSTRUCTION Bilateral 2004, 2007    COLONOSCOPY  10/13/2020    DILATION AND CURETTAGE OF UTERUS      EYE SURGERY Bilateral     cataract removal    INCONTINENCE SURGERY      MASTECTOMY Bilateral 1982    for fibrocystic breast disease; has saline implants    [place] ENDOVENOUS RF, 1ST VEIN Right 8/10/2018    RIGHT GREASTER SAPHENOUS VEIN STAB PHLEBECTOMIES performed by Lenore Walker MD at [facility]       Prior to Admission medications    Medication Sig Start Date End Date Taking? Authorizing Provider   Ca & Phos-Vit D-Mag [phone] TABS Take by mouth   Yes Historical Provider, MD   Multiple Vitamins-Minerals (THERAPEUTIC MULTIVITAMIN-MINERALS) tablet Take 1 tablet by mouth daily   Yes Historical Provider, MD   gabapentin (NEURONTIN) 100 MG capsule Take 1 capsule by mouth 3 times daily for 30 days. Intended supply: 30 days  Patient taking differently: Take 100 mg by mouth 2 times daily.  Intended supply: 30 days 3/3/21 4/2/21 Yes Myriam Anderson MD   isosorbide mononitrate (IMDUR) 30 MG extended release tablet Take 1 tablet by mouth daily 5/4/20  Yes Gm Rutherford MD   metoprolol succinate (TOPROL XL) 50 MG extended release tablet Take 1 tablet by mouth 2 times daily 4/6/20  Yes Gm Rutherford MD   calcitonin (MIACALCIN) 200 UNIT/ACT nasal spray 1 spray by Nasal route daily  4/6/16  Yes Historical Provider, MD   Calcium-Magnesium-Vitamin D (CITRACAL CALCIUM+D PO) Take 600 mg by mouth 2 times daily    Yes Historical Provider, MD   vitamin D (CHOLECALCIFEROL) 1000 UNIT TABS tablet Take 1,000 Int'l Units by mouth daily Takes two tabs daily   Yes Historical Provider, MD   levothyroxine (SYNTHROID) 75 MCG tablet Take 75 mcg by mouth Daily    Yes Historical Provider, MD   amitriptyline (ELAVIL) 25 MG tablet Take 25 mg by mouth nightly    Yes Historical Provider, MD Allergies   Allergen Reactions    Iv Dye [Iodides] Hives    Pcn [Penicillins] Rash    Aspirin Other (See Comments)     tingling    Bactrim Other (See Comments)     Yeast infection    Drixoral [Dexbrompheniramine-Pseudoeph] Other (See Comments)     hyper    Morphine Other (See Comments)     Iv line vein reddened    Sulfamethoxazole-Trimethoprim Other (See Comments)     Social History     Socioeconomic History    Marital status:      Spouse name: Not on file    Number of children: Not on file    Years of education: Not on file    Highest education level: Not on file   Occupational History    Not on file   Social Needs    Financial resource strain: Not on file    Food insecurity     Worry: Not on file     Inability: Not on file    Transportation needs     Medical: Not on file     Non-medical: Not on file   Tobacco Use    Smoking status: Never Smoker    Smokeless tobacco: Never Used   Substance and Sexual Activity    Alcohol use: No    Drug use: No    Sexual activity: Not Currently     Partners: Male   Lifestyle    Physical activity     Days per week: Not on file     Minutes per session: Not on file    Stress: Not on file   Relationships    Social connections     Talks on phone: Not on file     Gets together: Not on file     Attends Quaker service: Not on file     Active member of club or organization: Not on file     Attends meetings of clubs or organizations: Not on file     Relationship status: Not on file    Intimate partner violence     Fear of current or ex partner: Not on file     Emotionally abused: Not on file     Physically abused: Not on file     Forced sexual activity: Not on file   Other Topics Concern    Not on file   Social History Narrative    Not on file     Family History   Problem Relation Age of Onset    Kidney Disease Mother     Heart Disease Father      REVIEW OF SYSTEMS:     Patient specifically denies fever/chills, chest pain, shortness of breath, new bowel or bladder complaints. All other review of systems was negative. PHYSICAL EXAMINATION:      /68   Pulse 75   Temp 96.9 °F (36.1 °C) (Infrared)   Resp 16   Ht 5' 5\" (1.651 m)   Wt 170 lb (77.1 kg)   SpO2 99%   BMI 28.29 kg/m²     General:      General appearance:   elderly, pleasant and well-hydrated. , in moderate discomfort and A & O x3  Build:Overweight    HEENT:    Head:normocephalic and atraumatic  Sclera: icterus absent,     Lungs:    Breathing:Breathing Pattern: regular, no distress    Abdomen:    Shape:non-distended and normal  Tenderness:none    Lumbar spine:    Spine inspection:scoliosis   CVA tenderness:No   Palpation:tenderness paravertebral muscles, facet loading, left, right, positive and tenderness. Range of motion:abnormal moderately Lateral bending, flexion, extension rotation bilateral and is  painful. Musculoskeletal:    Trigger points in Paraveteral:absent bilaterally  SI joint tenderness:negative right, negative left              TAY test:negative right, negative             left  Piriformis tenderness:negative right, negative left  Trochanteric bursa tenderness:positive right, positive left  SLR:negative right, negative left, sitting     Extremities:    Tremors:None bilaterally upper and lower  Range of motion: pain with internal rotation of hips negative. Intact:Yes  Edema:Normal    Neurological:    Sensory:normal to light touch bilateral lower extremities  Motor:                       Right Quadriceps5/5          Left Quadriceps5/5           Right Gastrocnemius5/5    Left Gastrocnemius5/5  Right Ant Tibialis5/5  Left Ant Tibialis5/5  Reflexes:    Right Quadriceps reflex2+  Left Quadriceps reflex2+  Right Achilles reflex2+  Left Achilles reflex2+  Gait:normal    Dermatology:    Skin:no unusual rashes and no skin lesions    Impression:  Bilateral leg pain. Plan:  Most of her leg pain appears to be related sensory neuropathy.   Reviewed lumbar spine MRI/EMG of bilateral lower extremities. Continue with Gabapentin but will change to 300 mg BID. Will consider adding Cymbalta to her medications. Will consider diagnostic LESI if medication adjustment fails to improve her pain. Cancel urine screen  OARRS report reviewed 03/2021. Patient encouraged to stay active and to lose weight. Treatment plan discussed with the patient including medications and procedure side effects     We discussed with the patient that combining opioids, benzodiazepines, alcohol, illicit drugs or sleep aids increases the risk of respiratory depression including death. We discussed that these medications may cause drowsiness, sedation or dizziness and have counseled the patient not to drive or operate machinery. We have discussed that these medications will be prescribed only by one provider. We have discussed with the patient about age related risk factors and have thoroughly discussed the importance of taking these medications as prescribed. The patient verbalizes understanding. jacqui Brady M.D.

## 2021-03-11 ENCOUNTER — TELEPHONE (OUTPATIENT)
Dept: NEUROLOGY | Age: 86
End: 2021-03-11

## 2021-03-11 NOTE — TELEPHONE ENCOUNTER
----- Message from Nona Valdez MD sent at 3/11/2021 11:21 AM EST -----  Please inform the patient that her vitamin B1 levels are normal.

## 2021-03-23 ENCOUNTER — OFFICE VISIT (OUTPATIENT)
Dept: PAIN MANAGEMENT | Age: 86
End: 2021-03-23
Payer: MEDICARE

## 2021-03-23 VITALS
SYSTOLIC BLOOD PRESSURE: 150 MMHG | WEIGHT: 170 LBS | RESPIRATION RATE: 18 BRPM | HEART RATE: 62 BPM | DIASTOLIC BLOOD PRESSURE: 70 MMHG | TEMPERATURE: 97.1 F | OXYGEN SATURATION: 97 % | BODY MASS INDEX: 28.32 KG/M2 | HEIGHT: 65 IN

## 2021-03-23 DIAGNOSIS — M48.061 SPINAL STENOSIS OF LUMBAR REGION WITHOUT NEUROGENIC CLAUDICATION: Primary | ICD-10-CM

## 2021-03-23 DIAGNOSIS — M79.2 NEURALGIA AND NEURITIS: ICD-10-CM

## 2021-03-23 DIAGNOSIS — M51.9 LUMBAR DISC DISORDER: ICD-10-CM

## 2021-03-23 DIAGNOSIS — M47.816 LUMBAR FACET ARTHROPATHY: ICD-10-CM

## 2021-03-23 DIAGNOSIS — G89.4 CHRONIC PAIN SYNDROME: ICD-10-CM

## 2021-03-23 DIAGNOSIS — M47.816 LUMBAR SPONDYLOSIS: ICD-10-CM

## 2021-03-23 PROCEDURE — 99214 OFFICE O/P EST MOD 30 MIN: CPT | Performed by: PAIN MEDICINE

## 2021-03-23 PROCEDURE — 99213 OFFICE O/P EST LOW 20 MIN: CPT | Performed by: PAIN MEDICINE

## 2021-03-23 RX ORDER — GABAPENTIN 300 MG/1
300 CAPSULE ORAL 3 TIMES DAILY
Qty: 90 CAPSULE | Refills: 0 | Status: SHIPPED
Start: 2021-03-23 | End: 2021-06-23

## 2021-03-23 NOTE — PROGRESS NOTES
[facility]  [address] [facility], [address] [place] [address]  [phone]    Follow up Note      Jenny Carreon     Date of Visit:  3/23/2021    CC:  Patient presents for follow up   Chief Complaint   Patient presents with    Follow-up     HPI:    Pain is unchanged. Appropriate analgesia with current medications regimen: yes - . Change in quality of symptoms:right thigh pain. Medication side effects:none. Recent diagnostic testing:none. Recent interventional procedures:none. .    She has not been on anticoagulation medications to include none. The patient  has not been on herbal supplements. The patient is not diabetic.     Imaging:   Lumbar spine MRI   Multilevel lumbar spondylosis most pronounced at L3-4, there is diffuse disc bulge with facet hypertrophy resulting in moderate central canal and bilateral foraminal narrowing. Mild right foraminal narrowing at L2-3 and mild bilateral foraminal narrowing at L4-5 and L5-S1.     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     The study also reveals evidence of chronic neurogenic changes in the right S1 myotome indicative of a right chronic S1 radiculopathy.     Previous treatments: Physical Therapy and medications. .         Potential Aberrant Drug-Related Behavior:  No      Urine Drug Screening:  None    OARRS report:  03/2021 consistent    Opioid Agreement:  Date enacted:N/A  Renewal date:N/A    Past Medical History:   Diagnosis Date    Abdominal pain     Arthritis     l knee injections with dr Marie Stanford    Chest pain     resolved    Difficult intubation 1-    document on chart    Hypertension     Hypothyroidism     Migraine headache     Osteopenia     Palpitation     resolved  Plantar fasciitis     Skipped heart beats     Tingling of both feet     Tinnitus     hissing bilateral     Past Surgical History:   Procedure Laterality Date    BREAST RECONSTRUCTION Bilateral 2004, 2007    COLONOSCOPY  10/13/2020    DILATION AND CURETTAGE OF UTERUS      EYE SURGERY Bilateral     cataract removal    INCONTINENCE SURGERY      MASTECTOMY Bilateral 1982    for fibrocystic breast disease; has saline implants    [place] ENDOVENOUS RF, 1ST VEIN Right 8/10/2018    RIGHT GREASTER SAPHENOUS VEIN STAB PHLEBECTOMIES performed by Octaviano Pearson MD at [address]       Prior to Admission medications    Medication Sig Start Date End Date Taking? Authorizing Provider   Ca & Phos-Vit D-Mag [phone] TABS Take by mouth   Yes Historical Provider, MD   gabapentin (NEURONTIN) 300 MG capsule Take 1 capsule by mouth 2 times daily for 20 days.  Intended supply: 90 days 3/10/21 3/30/21 Yes Kayce Espinosa MD   Multiple Vitamins-Minerals (THERAPEUTIC MULTIVITAMIN-MINERALS) tablet Take 1 tablet by mouth daily   Yes Historical Provider, MD   isosorbide mononitrate (IMDUR) 30 MG extended release tablet Take 1 tablet by mouth daily 5/4/20  Yes Era Browning MD   metoprolol succinate (TOPROL XL) 50 MG extended release tablet Take 1 tablet by mouth 2 times daily 4/6/20  Yes Era Browning MD   calcitonin (MIACALCIN) 200 UNIT/ACT nasal spray 1 spray by Nasal route daily  4/6/16  Yes Historical Provider, MD   Calcium-Magnesium-Vitamin D (CITRACAL CALCIUM+D PO) Take 600 mg by mouth 2 times daily    Yes Historical Provider, MD   vitamin D (CHOLECALCIFEROL) 1000 UNIT TABS tablet Take 1,000 Int'l Units by mouth daily Takes two tabs daily   Yes Historical Provider, MD   levothyroxine (SYNTHROID) 75 MCG tablet Take 75 mcg by mouth Daily    Yes Historical Provider, MD   amitriptyline (ELAVIL) 25 MG tablet Take 25 mg by mouth nightly    Yes Historical Provider, MD     Allergies   Allergen Reactions    Iv Dye [Iodides] Hives    Pcn [Penicillins] Rash    Aspirin Other (See Comments)     tingling    Bactrim Other (See Comments)     Yeast infection    Drixoral [Dexbrompheniramine-Pseudoeph] Other (See Comments)     hyper    Morphine Other (See Comments)     Iv line vein reddened    Sulfamethoxazole-Trimethoprim Other (See Comments)     Social History     Socioeconomic History    Marital status:      Spouse name: Not on file    Number of children: Not on file    Years of education: Not on file    Highest education level: Not on file   Occupational History    Not on file   Social Needs    Financial resource strain: Not on file    Food insecurity     Worry: Not on file     Inability: Not on file    Transportation needs     Medical: Not on file     Non-medical: Not on file   Tobacco Use    Smoking status: Never Smoker    Smokeless tobacco: Never Used   Substance and Sexual Activity    Alcohol use: No    Drug use: No    Sexual activity: Not Currently     Partners: Male   Lifestyle    Physical activity     Days per week: Not on file     Minutes per session: Not on file    Stress: Not on file   Relationships    Social connections     Talks on phone: Not on file     Gets together: Not on file     Attends Hinduism service: Not on file     Active member of club or organization: Not on file     Attends meetings of clubs or organizations: Not on file     Relationship status: Not on file    Intimate partner violence     Fear of current or ex partner: Not on file     Emotionally abused: Not on file     Physically abused: Not on file     Forced sexual activity: Not on file   Other Topics Concern    Not on file   Social History Narrative    Not on file     Family History   Problem Relation Age of Onset    Kidney Disease Mother     Heart Disease Father      REVIEW OF SYSTEMS:     Yao Bower denies fever/chills, chest pain, shortness of breath, new bowel or bladder complaints.  All other review of systems was negative. PHYSICAL EXAMINATION:      BP (!) 150/70   Pulse 62   Temp 97.1 °F (36.2 °C)   Resp 18   Ht 5' 5\" (1.651 m)   Wt 170 lb (77.1 kg)   SpO2 97%   BMI 28.29 kg/m²     General:       General appearance:   elderly, pleasant and well-hydrated. , in moderate discomfort and A & O x3  Build:Overweight     HEENT:     Head:normocephalic and atraumatic  Sclera: icterus absent,      Lungs:     Breathing:Breathing Pattern: regular, no distress     Abdomen:     Shape:non-distended and normal  Tenderness:none     Lumbar spine:     Spine inspection:scoliosis   CVA tenderness:No   Palpation:tenderness paravertebral muscles, facet loading, left, right, positive and tenderness. Range of motion:abnormal moderately Lateral bending, flexion, extension rotation bilateral and is  painful.     Musculoskeletal:     Trigger points in Paraveteral:absent bilaterally  SI joint tenderness:negative right, negative left              TAY test:negative right, negative             left  Piriformis tenderness:negative right, negative left  Trochanteric bursa tenderness:positive right, positive left  SLR:negative right, negative left, sitting      Extremities:     Tremors:None bilaterally upper and lower  Range of motion: pain with internal rotation of hips negative. Intact:Yes  Edema:Normal     Neurological:     Sensory:normal to light touch bilateral lower extremities  Motor:                       Right Quadriceps5/5          Left Quadriceps5/5           Right Gastrocnemius5/5    Left Gastrocnemius5/5  Right Ant Tibialis5/5  Left Ant Tibialis5/5  Reflexes:    Right Quadriceps reflex2+  Left Quadriceps reflex2+  Right Achilles reflex2+  Left Achilles reflex2+  Gait:normal     Dermatology:     Skin:no unusual rashes and no skin lesions     Impression:  Bilateral leg pain.   Plan:  Follow up on her bilateral leg pain, patient mentioned that her pain is moving up to the lateral aspect of her right thigh consistent with L5

## 2021-03-23 NOTE — PROGRESS NOTES
Do you currently have any of the following:     Fever: No  Headache:  No  Cough: No  Shortness of breath: No  Exposed to anyone with these symptoms: Marycruz                                                                                                                Andrew Collins presents to the [facility] on 3/23/2021. Ellaree Kawasaki is complaining of pain lower leg pain that intermittently radiates upward to bilateral thighs. The pain is persistent. The pain is described as dull and burning. Pain is rated on her best day at a 1, on her worst day at a 3, and on average at a 2 on the VAS scale. She took her last dose of Neurontin  This am at [address] [place] [address] occasionally have issues with constipation. Any procedures since your last visit: No,She is not on NSAIDS and  is not on anticoagulation medications. Pacemaker or defibrillator: No   Medication Contract and Consent for Opioid Use Documents Filed      No documents found                   BP (!) 150/70   Pulse 62   Temp 97.1 °F (36.2 °C)   Resp 18   Ht 5' 5\" (1.651 m)   Wt 170 lb (77.1 kg)   SpO2 97%   BMI 28.29 kg/m²      No LMP recorded.  Patient is postmenopausal.

## 2021-03-30 ENCOUNTER — HOSPITAL ENCOUNTER (OUTPATIENT)
Age: 86
Discharge: HOME | End: 2021-04-01
Payer: MEDICARE

## 2021-03-30 ENCOUNTER — HOSPITAL ENCOUNTER (OUTPATIENT)
Dept: INFUSION THERAPY | Age: 86
Discharge: HOME | End: 2021-03-30
Payer: MEDICARE

## 2021-03-30 ENCOUNTER — HOSPITAL ENCOUNTER (OUTPATIENT)
Dept: GENERAL RADIOLOGY | Age: 86
Discharge: HOME | End: 2021-04-01
Payer: MEDICARE

## 2021-03-30 ENCOUNTER — OFFICE VISIT (OUTPATIENT)
Dept: ONCOLOGY | Age: 86
End: 2021-03-30
Payer: MEDICARE

## 2021-03-30 VITALS
HEART RATE: 67 BPM | BODY MASS INDEX: 28.32 KG/M2 | HEIGHT: 65 IN | OXYGEN SATURATION: 97 % | TEMPERATURE: 98.1 F | SYSTOLIC BLOOD PRESSURE: 168 MMHG | DIASTOLIC BLOOD PRESSURE: 72 MMHG | WEIGHT: 170 LBS

## 2021-03-30 DIAGNOSIS — D47.2 MONOCLONAL GAMMOPATHY: Primary | ICD-10-CM

## 2021-03-30 DIAGNOSIS — D47.2 MONOCLONAL GAMMOPATHY: ICD-10-CM

## 2021-03-30 LAB
ANION GAP SERPL CALCULATED.3IONS-SCNC: 11 MMOL/L (ref 7–16)
BASOPHILS ABSOLUTE: 0.02 E9/L (ref 0–0.2)
BASOPHILS RELATIVE PERCENT: 0.1 % (ref 0–2)
BUN BLDV-MCNC: 18 MG/DL (ref 8–23)
CALCIUM SERPL-MCNC: 9.9 MG/DL (ref 8.6–10.2)
CHLORIDE BLD-SCNC: 102 MMOL/L (ref 98–107)
CO2: 26 MMOL/L (ref 22–29)
CREAT SERPL-MCNC: 0.8 MG/DL (ref 0.5–1)
EOSINOPHILS ABSOLUTE: 0.02 E9/L (ref 0.05–0.5)
EOSINOPHILS RELATIVE PERCENT: 0.1 % (ref 0–6)
GFR AFRICAN AMERICAN: >60
GFR NON-AFRICAN AMERICAN: >60 ML/MIN/1.73
GLUCOSE BLD-MCNC: 109 MG/DL (ref 74–99)
HCT VFR BLD CALC: 45.3 % (ref 34–48)
HEMOGLOBIN: 14.7 G/DL (ref 11.5–15.5)
IMMATURE GRANULOCYTES #: 0.05 E9/L
IMMATURE GRANULOCYTES %: 0.4 % (ref 0–5)
LYMPHOCYTES ABSOLUTE: 1.14 E9/L (ref 1.5–4)
LYMPHOCYTES RELATIVE PERCENT: 8 % (ref 20–42)
MCH RBC QN AUTO: 29.9 PG (ref 26–35)
MCHC RBC AUTO-ENTMCNC: 32.5 % (ref 32–34.5)
MCV RBC AUTO: 92.1 FL (ref 80–99.9)
MONOCYTES ABSOLUTE: 0.69 E9/L (ref 0.1–0.95)
MONOCYTES RELATIVE PERCENT: 4.9 % (ref 2–12)
NEUTROPHILS ABSOLUTE: 12.26 E9/L (ref 1.8–7.3)
NEUTROPHILS RELATIVE PERCENT: 86.5 % (ref 43–80)
PDW BLD-RTO: 13.8 FL (ref 11.5–15)
PLATELET # BLD: 162 E9/L (ref 130–450)
PMV BLD AUTO: 11 FL (ref 7–12)
POTASSIUM SERPL-SCNC: 4.2 MMOL/L (ref 3.5–5)
RBC # BLD: 4.92 E12/L (ref 3.5–5.5)
SODIUM BLD-SCNC: 139 MMOL/L (ref 132–146)
WBC # BLD: 14.2 E9/L (ref 4.5–11.5)

## 2021-03-30 PROCEDURE — 36415 COLL VENOUS BLD VENIPUNCTURE: CPT

## 2021-03-30 PROCEDURE — 77075 RADEX OSSEOUS SURVEY COMPL: CPT

## 2021-03-30 PROCEDURE — 99214 OFFICE O/P EST MOD 30 MIN: CPT | Performed by: INTERNAL MEDICINE

## 2021-03-30 PROCEDURE — 99205 OFFICE O/P NEW HI 60 MIN: CPT | Performed by: INTERNAL MEDICINE

## 2021-03-30 NOTE — PROGRESS NOTES
Yin So  1935 82 y.o. Referring Physician: Dr Anthony Flores     PCP: Rocío Gamino MD    Vitals:    21 1342   BP: (!) 168/72   Pulse: 67   Temp: 98.1 °F (36.7 °C)   SpO2: 97%        Wt Readings from Last 3 Encounters:   21 170 lb (77.1 kg)   21 170 lb (77.1 kg)   03/10/21 170 lb (77.1 kg)        Body mass index is 28.29 kg/m². Chief Complaint:   Chief Complaint   Patient presents with    New Patient         Cancer Staging  No matching staging information was found for the patient. Prior Radiation Therapy? NO    Concurrent Chemo/radiation? NO    Prior Chemotherapy? NO    Prior Hormonal Therapy? NO    Head and Neck Cancer? No, patient does NOT have HN cancer. LMP: 48    Age at first Menses: 17    : 2    Para: 2          Current Outpatient Medications:     gabapentin (NEURONTIN) 300 MG capsule, Take 1 capsule by mouth 3 times daily for 30 days.  Intended supply: 90 days, Disp: 90 capsule, Rfl: 0    Ca & Phos-Vit D-Mag [phone] TABS, Take by mouth, Disp: , Rfl:     Multiple Vitamins-Minerals (THERAPEUTIC MULTIVITAMIN-MINERALS) tablet, Take 1 tablet by mouth daily, Disp: , Rfl:     isosorbide mononitrate (IMDUR) 30 MG extended release tablet, Take 1 tablet by mouth daily, Disp: 30 tablet, Rfl: 11    metoprolol succinate (TOPROL XL) 50 MG extended release tablet, Take 1 tablet by mouth 2 times daily, Disp: 180 tablet, Rfl: 3    calcitonin (MIACALCIN) 200 UNIT/ACT nasal spray, 1 spray by Nasal route daily , Disp: , Rfl:     vitamin D (CHOLECALCIFEROL) 1000 UNIT TABS tablet, Take 1,000 Int'l Units by mouth daily Takes two tabs daily, Disp: , Rfl:     levothyroxine (SYNTHROID) 75 MCG tablet, Take 75 mcg by mouth Daily , Disp: , Rfl:     amitriptyline (ELAVIL) 25 MG tablet, Take 25 mg by mouth nightly , Disp: , Rfl:     Calcium-Magnesium-Vitamin D (CITRACAL CALCIUM+D PO), Take 600 mg by mouth 2 times daily , Disp: , Rfl:        Past Medical History: club or organization: Not on file     Attends meetings of clubs or organizations: Not on file     Relationship status: Not on file    Intimate partner violence     Fear of current or ex partner: Not on file     Emotionally abused: Not on file     Physically abused: Not on file     Forced sexual activity: Not on file   Other Topics Concern    Not on file   Social History Narrative    Not on file           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 REVIEW OF SYSTEMS: <<For Level 5, 10 or more systems>>     Pacemaker/Defibulator/ICD:  No    Mediport: No           FALLS RISK SCREENING ASSESSMENT    Instructions:  Assess the patient and [place] the appropriate indicators that are present for fall risk identification. Total the numbers circled and assign a fall risk score from Table 2.  Reassess patient at a minimum every 12 weeks or with status change. Assessment   Date  3/30/2021     1. Mental Ability: confusion/cognitively impaired No - 0       2. Elimination Issues: incontinence, frequency Yes - 3       3. Ambulatory: use of assistive devices (walker, cane, off-loading devices), attached to equipment (IV pole, oxygen) No - 0     4. Sensory Limitations: dizziness, vertigo, impaired vision No - 0       5. Age 72 years or greater - 1       10. Medication: diuretics, strong analgesics, hypnotics, sedatives, antihypertensive agents   Yes - 3   7. Falls:  recent history of falls within the last 3 months (not to include slipping or tripping)   No - 0   TOTAL 7    If score of 4 or greater was education given? Yes       TABLE 2   Risk Score Risk Level Plan of Care   0-3 Little or  No Risk 1. Provide assistance as indicated for ambulation activities  2. Reorient confused/cognitively impaired patient  3. Call-light/bell within patient's reach  4. Chair/bed in low position, stretcher/bed with siderails up except when performing patient care activities  5.   Educate patient/family/caregiver on falls prevention  6.  Reassess in 12 weeks or with any noted change in patient condition which places them at a risk for a fall   4-6 Moderate Risk 1. Provide assistance as indicated for ambulation activities  2. Reorient confused/cognitively impaired patient  3. Call-light/bell within patient's reach  4. Chair/bed in low position, stretcher/bed with siderails up except when performing patient care activities  5. Educate patient/family/caregiver on falls prevention  6. Falls risk precaution (Yellow sticker Level II) placed on patient chart   7 or   Higher High Risk 1. Place patient in easily observable treatment room  2. Patient attended at all times by family member or staff  3. Provide assistance as indicated for ambulation activities  4. Reorient confused/cognitively impaired patient  5. Call-light/bell within patient's reach  6. Chair/bed in low position, stretcher/bed with siderails up except when performing patient care activities  7. Educate patient/family/caregiver on falls prevention  8. Falls risk precaution (Yellow sticker Level III) placed on patient chart           MALNUTRITION RISK SCREENING ASSESSMENT    Instructions:  Assess the patient and enter the appropriate indicators that are present for nutrition risk identification. Total the numbers entered and assign a risk score. Follow the appropriate action for total score listed below. Assessment   Date  3/30/2021     1. Have you lost weight without trying? 0- No     2. Have you been eating poorly because of a decreased appetite? 0- No   3. Do you have a diagnosis of head and neck cancer?       0- No                                                                                    TOTAL 0        Score of 0-1: No action  Score 2 or greater:  · For Non-Diabetic Patient: Recommend adding Ensure Enlive 2 x daily and provide patient with Ensure wellness bag with coupons  · For Diabetic Patient: Recommend adding Glucerna Shake 2 x daily and provide patient with Glucerna Wellness bag with coupons  · Route to the dietitian via [address]    · Are you having  difficulty performing daily routine tasks  due to fatigue or weakness (ie: bathing/showering, dressing, housework, meal prep, work, child Murleen Bullocks): No     · Do you have any arm flexibility/ROM restrictions, swelling or pain that limit activity: No     · Any changes in memory, attention/focus that impact daily activities: No     · Do you avoid participation in leisure/social activity due weakness, fatigue or pain: No     ARE ANY OF THE ABOVE ARE ANSWERED YES: No          PT ASSESSMENT FOR REFERRAL    · Have you had any recent falls in past 2 months: No     · Do you have difficulty  going up/down stairs: No     · Are you having difficulty walking: No     · Do you often hold onto furniture/environmental supports or feel off balance when you are walking: No     · Do you need to take rest breaks when you are walking: No     · Any pain on scale of 1-10 that limits your mobility: No 0/10    ARE ANY OF THE ABOVE ARE ANSWERED YES: No                     Dash Gee

## 2021-03-30 NOTE — PROGRESS NOTES
Osteopenia    Varicose veins of both lower extremities    Venous insufficiency of both lower extremities    Hypothyroidism    Left arm pain    Neuralgia and neuritis    Carpal tunnel syndrome of right wrist    Lumbar radiculopathy    Type 2 diabetes mellitus without complication, without long-term current use of insulin (HCC)    Numbness and tingling    Chronic pain syndrome    Lumbar facet arthropathy    Lumbar disc disorder    Spinal stenosis of lumbar region without neurogenic claudication        Past Surgical History:      Procedure Laterality Date    BLADDER SURGERY      mesh    BREAST RECONSTRUCTION Bilateral 2004, 2007    COLONOSCOPY  10/13/2020    DILATION AND CURETTAGE OF UTERUS      EYE SURGERY Bilateral     cataract removal    INCONTINENCE SURGERY      MASTECTOMY Bilateral 1982    for fibrocystic breast disease; has saline implants    [place] ENDOVENOUS RF, 1ST VEIN Right 8/10/2018    RIGHT GREASTER SAPHENOUS VEIN STAB PHLEBECTOMIES performed by Clara Osman MD at [facility] 22 TONSILLECTOMY         Family History:  Family History   Problem Relation Age of Onset    Kidney Disease Mother     Heart Disease Father     Coronary Art Dis Father     Coronary Art Dis Maternal Grandfather     Cancer Paternal Uncle         unknown       Medications:  Reviewed and reconciled. Social History:  Social History     Socioeconomic History    Marital status:      Spouse name: Not on file    Number of children: Not on file    Years of education: Not on file    Highest education level: Not on file   Occupational History    Not on file   Social Needs    Financial resource strain: Not on file    Food insecurity     Worry: Not on file     Inability: Not on file    Transportation needs     Medical: Not on file     Non-medical: Not on file   Tobacco Use    Smoking status: Never Smoker    Smokeless tobacco: Never Used   Substance and Sexual Activity    Alcohol use: No    Drug use:  No  Sexual activity: Not Currently     Partners: Male   Lifestyle    Physical activity     Days per week: Not on file     Minutes per session: Not on file    Stress: Not on file   Relationships    Social connections     Talks on phone: Not on file     Gets together: Not on file     Attends Mormon service: Not on file     Active member of club or organization: Not on file     Attends meetings of clubs or organizations: Not on file     Relationship status: Not on file    Intimate partner violence     Fear of current or ex partner: Not on file     Emotionally abused: Not on file     Physically abused: Not on file     Forced sexual activity: Not on file   Other Topics Concern    Not on file   Social History Narrative    Not on file       Allergies: Allergies   Allergen Reactions    Iv Dye [Iodides] Hives    Pcn [Penicillins] Rash    Aspirin Other (See Comments)     tingling    Bactrim Other (See Comments)     Yeast infection    Drixoral [Dexbrompheniramine-Pseudoeph] Other (See Comments)     hyper    Morphine Other (See Comments)     Iv line vein reddened    Sulfamethoxazole-Trimethoprim Other (See Comments)       Physical Exam:  BP (!) 168/72 (Site: Left Upper Arm, Position: Sitting, Cuff Size: Medium Adult)   Pulse 67   Temp 98.1 °F (36.7 °C) (Temporal)   Ht 5' 5\" (1.651 m)   Wt 170 lb (77.1 kg)   SpO2 97%   BMI 28.29 kg/m²   GENERAL: Alert, oriented x 3, not in acute distress. HEENT: PERRLA; EOMI. Oropharynx clear. NECK: Supple. No palpable cervical or supraclavicular lymphadenopathy. LUNGS: Good air entry bilaterally. No wheezing, crackles or rhonchi. CARDIOVASCULAR: Regular rate. No murmurs, rubs or gallops. ABDOMEN: Soft. Non-tender, non-distended. Positive bowel sounds. EXTREMITIES: Without clubbing, cyanosis, or edema. NEUROLOGIC: No focal deficits.    ECOG PS 1    Impression/Plan:        The patient is a very pleasant 29-year-old lady, with a past medical history significant

## 2021-04-01 LAB
ALBUMIN SERPL-MCNC: 3.3 G/DL (ref 3.5–4.7)
ALPHA-1-GLOBULIN: 0.2 G/DL (ref 0.2–0.4)
ALPHA-2-GLOBULIN: 0.8 G/FL (ref 0.5–1)
BETA GLOBULIN: 1.1 G/DL (ref 0.8–1.3)
BETA-2 MICROGLOBULIN: 2.2 MG/L (ref 0.6–2.4)
ELECTROPHORESIS: ABNORMAL
GAMMA GLOBULIN: 1.7 G/DL (ref 0.7–1.6)
IGA: 131 MG/DL (ref 70–400)
IGG: 907 MG/DL (ref 700–1600)
IGM: 114 MG/DL (ref 40–230)
IMMUNOFIXATION RESULT, SERUM: NORMAL
KAPPA FREE LIGHT CHAINS QNT: 18.76 MG/L (ref 3.3–19.4)
KAPPA/LAMBDA FREE LIGHT CHAIN RATIO: 1.52 (ref 0.26–1.65)
LAMBDA FREE LIGHT CHAINS QNT: 12.34 MG/L (ref 5.71–26.3)
TOTAL PROTEIN: 7.1 G/DL (ref 6.4–8.3)

## 2021-04-05 ENCOUNTER — TELEPHONE (OUTPATIENT)
Dept: CARDIOLOGY CLINIC | Age: 86
End: 2021-04-05

## 2021-04-05 DIAGNOSIS — I10 HYPERTENSION, UNSPECIFIED TYPE: Primary | ICD-10-CM

## 2021-04-05 NOTE — TELEPHONE ENCOUNTER
Patient notified of Dr. Benji Waters recommendation. Patient states she has already tried Norvasc and it was discontinued due to LE edema. Please advise.

## 2021-04-05 NOTE — TELEPHONE ENCOUNTER
Patient called with complaints of hypertension for the last month or so. BP running 150-160s/60-70s. Patient denies any chest pain, shortness of breath or LE edema. Scheduled to F/U on 5/5/21 at 3:00 p.m. Please advise.

## 2021-04-06 RX ORDER — LOSARTAN POTASSIUM 50 MG/1
50 TABLET ORAL DAILY
Qty: 30 TABLET | Refills: 11 | Status: SHIPPED
Start: 2021-04-06 | End: 2021-04-27 | Stop reason: DRUGHIGH

## 2021-04-06 NOTE — TELEPHONE ENCOUNTER
Patient notified of Dr. Malcolm's recommendations. Losartan e-scribed to pharmacy. Chart amended to list intolerance to Norvasc. Order placed for BMP. Patient will call with BP/P readings on 4/14/21.

## 2021-04-12 ENCOUNTER — HOSPITAL ENCOUNTER (OUTPATIENT)
Age: 86
Setting detail: OUTPATIENT SURGERY
Discharge: HOME | End: 2021-04-12
Attending: PAIN MEDICINE | Admitting: PAIN MEDICINE
Payer: MEDICARE

## 2021-04-12 ENCOUNTER — HOSPITAL ENCOUNTER (OUTPATIENT)
Dept: OPERATING ROOM | Age: 86
Setting detail: OUTPATIENT SURGERY
Discharge: HOME | End: 2021-04-12
Attending: PAIN MEDICINE
Payer: MEDICARE

## 2021-04-12 VITALS
DIASTOLIC BLOOD PRESSURE: 57 MMHG | RESPIRATION RATE: 16 BRPM | WEIGHT: 170 LBS | BODY MASS INDEX: 27.32 KG/M2 | SYSTOLIC BLOOD PRESSURE: 153 MMHG | HEIGHT: 66 IN | OXYGEN SATURATION: 98 % | HEART RATE: 64 BPM

## 2021-04-12 DIAGNOSIS — M54.16 LUMBAR RADICULOPATHY: ICD-10-CM

## 2021-04-12 PROCEDURE — 2500000003 HC RX 250 WO HCPCS: Performed by: PAIN MEDICINE

## 2021-04-12 PROCEDURE — 62323 NJX INTERLAMINAR LMBR/SAC: CPT | Performed by: PAIN MEDICINE

## 2021-04-12 PROCEDURE — 3600000005 HC SURGERY LEVEL 5 BASE: Performed by: PAIN MEDICINE

## 2021-04-12 PROCEDURE — 7100000010 HC PHASE II RECOVERY - FIRST 15 MIN: Performed by: PAIN MEDICINE

## 2021-04-12 PROCEDURE — A9579 GAD-BASE MR CONTRAST NOS,1ML: HCPCS | Performed by: PAIN MEDICINE

## 2021-04-12 PROCEDURE — 6360000002 HC RX W HCPCS: Performed by: PAIN MEDICINE

## 2021-04-12 PROCEDURE — 2709999900 HC NON-CHARGEABLE SUPPLY: Performed by: PAIN MEDICINE

## 2021-04-12 PROCEDURE — 3209999900 FLUORO FOR SURGICAL PROCEDURES

## 2021-04-12 PROCEDURE — 7100000011 HC PHASE II RECOVERY - ADDTL 15 MIN: Performed by: PAIN MEDICINE

## 2021-04-12 PROCEDURE — 6360000004 HC RX CONTRAST MEDICATION: Performed by: PAIN MEDICINE

## 2021-04-12 RX ORDER — LIDOCAINE HYDROCHLORIDE 5 MG/ML
INJECTION, SOLUTION INFILTRATION; INTRAVENOUS PRN
Status: DISCONTINUED | OUTPATIENT
Start: 2021-04-12 | End: 2021-04-12 | Stop reason: ALTCHOICE

## 2021-04-12 NOTE — OP NOTE
and lateral fluoroscopic imaging is performed to verify that the epidural needle is properly placed. Negative aspiration of blood and CSF was confirmed. 0.5 ml of omnipaque 240 was used for confirmation of even epidural spread under both live and AP fluoroscopy. After negative aspiration, a solution of 0.5 % Lidocaine 2 ml and 80 mg DepoMedrol was easily injected. The needle was gently removed intact . The patient back was cleaned and a Band-Aid was placed over the needle insertion point. Disposition the patient tolerated the procedure well and there were no complications . Vital signs remained stable throughout the procedure. The patient was escorted to the recovery area where they remained until discharge and written discharge instructions for the procedure were given. Plan: Daya Eaton will return to our pain management center as scheduled.      Malika Zamora MD

## 2021-04-12 NOTE — H&P
Stress: Not on file   Relationships    Social connections     Talks on phone: Not on file     Gets together: Not on file     Attends Latter-day service: Not on file     Active member of club or organization: Not on file     Attends meetings of clubs or organizations: Not on file     Relationship status: Not on file    Intimate partner violence     Fear of current or ex partner: Not on file     Emotionally abused: Not on file     Physically abused: Not on file     Forced sexual activity: Not on file   Other Topics Concern    Not on file   Social History Narrative    Not on file       Family History   Problem Relation Age of Onset    Kidney Disease Mother     Heart Disease Father     Coronary Art Dis Father     Coronary Art Dis Maternal Grandfather     Cancer Paternal Uncle         unknown         REVIEW OF SYSTEMS:    CONSTITUTIONAL:  negative for  fevers, chills, sweats and fatigue    RESPIRATORY:  negative for  dry cough, cough with sputum, dyspnea, wheezing and chest pain    CARDIOVASCULAR:  negative for chest pain, dyspnea, palpitations, syncope    GASTROINTESTINAL:  negative for nausea, vomiting, change in bowel habits, diarrhea, constipation and abdominal pain    MUSCULOSKELETAL: negative for muscle weakness    SKIN: negative for itching or rashes. BEHAVIOR/PSYCH:  negative for poor appetite, increased appetite, decreased sleep and poor concentration    All other systems negative      PHYSICAL EXAM:    VITALS:  /62   Pulse 68   Resp 18   Ht 5' 5.5\" (1.664 m)   Wt 170 lb (77.1 kg)   SpO2 99%   BMI 27.86 kg/m²     CONSTITUTIONAL:  awake, alert, cooperative, no apparent distress, and appears stated age    EYES: PERRLA, EOMI    LUNGS:  No increased work of breathing, no audible wheezing    CARDIOVASCULAR:  regular rate and rhythm    ABDOMEN:  Soft non tender non distended     EXTREMITIES: no signs of clubbing or cyanosis.     MUSCULOSKELETAL: negative for flaccid muscle tone or spastic

## 2021-04-14 ENCOUNTER — TELEPHONE (OUTPATIENT)
Dept: CARDIOLOGY CLINIC | Age: 86
End: 2021-04-14

## 2021-04-14 DIAGNOSIS — I10 HYPERTENSION, UNSPECIFIED TYPE: ICD-10-CM

## 2021-04-14 LAB
ANION GAP SERPL CALCULATED.3IONS-SCNC: 12 MMOL/L (ref 7–16)
BUN BLDV-MCNC: 21 MG/DL (ref 8–23)
CALCIUM SERPL-MCNC: 9.5 MG/DL (ref 8.6–10.2)
CHLORIDE BLD-SCNC: 106 MMOL/L (ref 98–107)
CO2: 25 MMOL/L (ref 22–29)
CREAT SERPL-MCNC: 0.9 MG/DL (ref 0.5–1)
GFR AFRICAN AMERICAN: >60
GFR NON-AFRICAN AMERICAN: 60 ML/MIN/1.73
GLUCOSE BLD-MCNC: 92 MG/DL (ref 74–99)
POTASSIUM SERPL-SCNC: 4.4 MMOL/L (ref 3.5–5)
SODIUM BLD-SCNC: 143 MMOL/L (ref 132–146)

## 2021-04-14 NOTE — TELEPHONE ENCOUNTER
Patient notified of BP/P results and Dr. Malcolm's recommendation. HCTZ e-scribed to pharmacy. She will monitor BP at home and call in about a week. If she is able to tolerate we will e-scribe combination pill.

## 2021-04-14 NOTE — TELEPHONE ENCOUNTER
Please add hydrochlorothiazide 12.5 mg p.o. daily. We can combine both losartan and hydrochlorothiazide into a single combination with her next prescriptions.

## 2021-04-14 NOTE — TELEPHONE ENCOUNTER
Patient called with BP at Dr. Ashley Craven request.   Average in the a.m. is 156/67. Average mid day is 138/61. HR 60s. BMP drawn this morning and is pending. Please advise.

## 2021-04-15 RX ORDER — HYDROCHLOROTHIAZIDE 12.5 MG/1
12.5 CAPSULE, GELATIN COATED ORAL EVERY MORNING
Qty: 30 CAPSULE | Refills: 0 | Status: SHIPPED
Start: 2021-04-15 | End: 2021-05-05 | Stop reason: ALTCHOICE

## 2021-04-23 ENCOUNTER — OFFICE VISIT (OUTPATIENT)
Dept: PAIN MANAGEMENT | Age: 86
End: 2021-04-23
Payer: MEDICARE

## 2021-04-23 VITALS
SYSTOLIC BLOOD PRESSURE: 118 MMHG | RESPIRATION RATE: 16 BRPM | BODY MASS INDEX: 27.32 KG/M2 | HEART RATE: 68 BPM | OXYGEN SATURATION: 97 % | TEMPERATURE: 96.9 F | WEIGHT: 170 LBS | HEIGHT: 66 IN | DIASTOLIC BLOOD PRESSURE: 52 MMHG

## 2021-04-23 DIAGNOSIS — M51.9 LUMBAR DISC DISORDER: ICD-10-CM

## 2021-04-23 DIAGNOSIS — M47.816 LUMBAR FACET ARTHROPATHY: Primary | ICD-10-CM

## 2021-04-23 DIAGNOSIS — M47.816 LUMBAR SPONDYLOSIS: ICD-10-CM

## 2021-04-23 DIAGNOSIS — M79.2 NEURALGIA AND NEURITIS: ICD-10-CM

## 2021-04-23 DIAGNOSIS — G89.4 CHRONIC PAIN SYNDROME: ICD-10-CM

## 2021-04-23 DIAGNOSIS — M48.061 SPINAL STENOSIS OF LUMBAR REGION WITHOUT NEUROGENIC CLAUDICATION: ICD-10-CM

## 2021-04-23 PROCEDURE — 99213 OFFICE O/P EST LOW 20 MIN: CPT | Performed by: PAIN MEDICINE

## 2021-04-23 NOTE — PROGRESS NOTES
[address]  [address] [facility], [address] [place] [address] [place]  [phone]    Follow up Note      Ana Potts     Date of Visit:  4/23/2021    CC:  Patient presents for follow up   Chief Complaint   Patient presents with    Follow-up    Back Pain     low back pain    Toe Pain     both feet pain     HPI:    Pain is tolerable  Appropriate analgesia with current medications regimen: yes - . Change in quality of symptoms:none  Medication side effects:none. Recent diagnostic testing:none. Recent interventional procedures:LESI L5-S1 poor outcome    She has not been on anticoagulation medications to include none. The patient  has not been on herbal supplements. The patient is not diabetic.     Imaging:   Lumbar spine MRI   Multilevel lumbar spondylosis most pronounced at L3-4, there is diffuse disc bulge with facet hypertrophy resulting in moderate central canal and bilateral foraminal narrowing. Mild right foraminal narrowing at L2-3 and mild bilateral foraminal narrowing at L4-5 and L5-S1.     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     The study also reveals evidence of chronic neurogenic changes in the right S1 myotome indicative of a right chronic S1 radiculopathy.     Previous treatments: Physical Therapy and medications. .         Potential Aberrant Drug-Related Behavior:  No      Urine Drug Screening:  None    OARRS report:  04/2021 consistent    Opioid Agreement:  Date enacted:N/A  Renewal date:N/A    Past Medical History:   Diagnosis Date    Abdominal pain     Arthritis     l knee injections with dr Marsha Martinez    Chest pain     resolved    Difficult intubation 1-    document on chart    Hypertension     Hypothyroidism mouth 2 times daily    Yes Historical Provider, MD   vitamin D (CHOLECALCIFEROL) 1000 UNIT TABS tablet Take 1,000 Int'l Units by mouth daily Takes two tabs daily   Yes Historical Provider, MD   levothyroxine (SYNTHROID) 75 MCG tablet Take 75 mcg by mouth Daily    Yes Historical Provider, MD   amitriptyline (ELAVIL) 25 MG tablet Take 25 mg by mouth nightly    Yes Historical Provider, MD   gabapentin (NEURONTIN) 300 MG capsule Take 1 capsule by mouth 3 times daily for 30 days.  Intended supply: 90 days 3/23/21 4/22/21  Piedad Valero MD     Allergies   Allergen Reactions    Iv Dye [Iodides] Hives    Pcn [Penicillins] Rash    Norvasc [Amlodipine] Swelling    Aspirin Other (See Comments)     tingling    Bactrim Other (See Comments)     Yeast infection    Drixoral [Dexbrompheniramine-Pseudoeph] Other (See Comments)     hyper    Morphine Other (See Comments)     Iv line vein reddened     Social History     Socioeconomic History    Marital status:      Spouse name: Not on file    Number of children: Not on file    Years of education: Not on file    Highest education level: Not on file   Occupational History    Not on file   Social Needs    Financial resource strain: Not on file    Food insecurity     Worry: Not on file     Inability: Not on file    Transportation needs     Medical: Not on file     Non-medical: Not on file   Tobacco Use    Smoking status: Never Smoker    Smokeless tobacco: Never Used   Substance and Sexual Activity    Alcohol use: No    Drug use: No    Sexual activity: Not Currently     Partners: Male   Lifestyle    Physical activity     Days per week: Not on file     Minutes per session: Not on file    Stress: Not on file   Relationships    Social connections     Talks on phone: Not on file     Gets together: Not on file     Attends Hoahaoism service: Not on file     Active member of club or organization: Not on file     Attends meetings of clubs or organizations: Not on file Relationship status: Not on file    Intimate partner violence     Fear of current or ex partner: Not on file     Emotionally abused: Not on file     Physically abused: Not on file     Forced sexual activity: Not on file   Other Topics Concern    Not on file   Social History Narrative    Not on file     Family History   Problem Relation Age of Onset    Kidney Disease Mother     Heart Disease Father     Coronary Art Dis Father     Coronary Art Dis Maternal Grandfather     Cancer Paternal Uncle         unknown     REVIEW OF SYSTEMS:     Codi Castillo denies fever/chills, chest pain, shortness of breath, new bowel or bladder complaints. All other review of systems was negative. PHYSICAL EXAMINATION:      BP (!) 118/52   Pulse 68   Temp 96.9 °F (36.1 °C) (Temporal)   Resp 16   Ht 5' 5.5\" (1.664 m)   Wt 170 lb (77.1 kg)   SpO2 97%   BMI 27.86 kg/m²     General:       General appearance:   elderly, pleasant and well-hydrated. , in mild discomfort and A & O x3  Build:Overweight     HEENT:     Head:normocephalic and atraumatic  Sclera: icterus absent,      Lungs:     Breathing:Breathing Pattern: regular, no distress     Abdomen:     Shape:non-distended and normal  Tenderness:none     Lumbar spine:     Spine inspection:scoliosis   CVA tenderness:No   Palpation:tenderness paravertebral muscles, facet loading, left, right, positive and tenderness.   Range of motion:abnormal moderately Lateral bending, flexion, extension rotation bilateral and is  painful.     Musculoskeletal:     Trigger points in Paraveteral:absent bilaterally  SI joint tenderness:negative right, negative left              TAY test:negative right, negative             left  Piriformis tenderness:negative right, negative left  Trochanteric bursa tenderness:positive right, positive left  SLR:negative right, negative left, sitting      Extremities:     Tremors:None bilaterally upper and lower  Range of motion: pain with internal rotation of hips negative. Intact:Yes  Edema:Normal     Neurological:     Sensory:normal to light touch bilateral lower extremities  Motor:                       Right Quadriceps5/5          Left Quadriceps5/5           Right Gastrocnemius5/5    Left Gastrocnemius5/5  Right Ant Tibialis5/5  Left Ant Tibialis5/5  Reflexes:    Right Quadriceps reflex2+  Left Quadriceps reflex2+  Right Achilles reflex2+  Left Achilles reflex2+  Gait:normal     Dermatology:     Skin:no unusual rashes and no skin lesions     Impression:  Bilateral leg pain. Plan:  Follow up on her bilateral leg pain with no acute issues, patient mentioned that her pain is tolerable  Patient is s/p LESI L5-S1 with less than expected response, not a candidate for repeating. Continue with Gabapentin 300 mg TID. OARRS report reviewed 04/2021. Patient encouraged to stay active and to lose weight. Treatment plan discussed with the patient including medications side effects. We discussed with the patient that combining opioids, benzodiazepines, alcohol, illicit drugs or sleep aids increases the risk of respiratory depression including death. We discussed that these medications may cause drowsiness, sedation or dizziness and have counseled the patient not to drive or operate machinery. We have discussed that these medications will be prescribed only by one provider. We have discussed with the patient about age related risk factors and have thoroughly discussed the importance of taking these medications as prescribed. The patient verbalizes understanding. jacqui Conrad M.D.

## 2021-04-23 NOTE — PROGRESS NOTES
Do you currently have any of the following:    Fever: No  Headache:  No  Cough: No  Shortness of breath: No  Exposed to anyone with these symptoms: No                                                                                                                Roslyn Azar presents to the [facility] on 4/23/2021. Amairani Aguero is complaining of pain in her low back and into both legs, feet feel tingly. The pain is intermittent. The pain is described as aching, burning and intermittent. Pain is rated on her best day at a 2, on her worst day at a 5, and on average at a 3 on the VAS scale. She took her last dose of Neurontin this morning. Amairani Aguero does not have issues with constipation. Any procedures since your last visit: Yes, 4-12-21-LUMBAR EPIDURAL STEROID INJECTION L5-S1 RIGHT PARAMEDIAN had some relief for 6 days, then developed the pain again    She is not on NSAIDS and  is not on anticoagulation medications to include none. Pacemaker or defibrillator: No.    Medication Contract and Consent for Opioid Use Documents Filed      No documents found                   BP (!) 118/52   Pulse 68   Temp 96.9 °F (36.1 °C) (Temporal)   Resp 16   Ht 5' 5.5\" (1.664 m)   Wt 170 lb (77.1 kg)   SpO2 97%   BMI 27.86 kg/m²      No LMP recorded.  Patient is postmenopausal.

## 2021-04-26 ENCOUNTER — TELEPHONE (OUTPATIENT)
Dept: CARDIOLOGY CLINIC | Age: 86
End: 2021-04-26

## 2021-04-26 DIAGNOSIS — I10 HYPERTENSION, UNSPECIFIED TYPE: Primary | ICD-10-CM

## 2021-04-26 RX ORDER — ISOSORBIDE MONONITRATE 30 MG/1
30 TABLET, EXTENDED RELEASE ORAL DAILY
Qty: 30 TABLET | Refills: 11 | Status: SHIPPED
Start: 2021-04-26 | End: 2022-01-28 | Stop reason: SDUPTHER

## 2021-04-26 NOTE — TELEPHONE ENCOUNTER
[facility], [place]    Patient called stating BP is averaging 145/66 with an average HR of (63) since starting HCTZ 12.5 mg daily on 4/14/21. Please advise. diet     Access: Port  Dispo: Pending completion of chemotherapy    Christiana Cuba MD  Pediatric Resident, PGY-1  Plan of care discussed with Dr. Rosa  Physician Attestation   I, Al Rosa, [facility] protocol [id number] 

## 2021-04-27 ENCOUNTER — HOSPITAL ENCOUNTER (OUTPATIENT)
Dept: INFUSION THERAPY | Age: 86
Discharge: HOME | End: 2021-04-27
Payer: MEDICARE

## 2021-04-27 ENCOUNTER — OFFICE VISIT (OUTPATIENT)
Dept: ONCOLOGY | Age: 86
End: 2021-04-27
Payer: MEDICARE

## 2021-04-27 VITALS
WEIGHT: 169 LBS | TEMPERATURE: 97.2 F | OXYGEN SATURATION: 96 % | HEART RATE: 64 BPM | BODY MASS INDEX: 28.16 KG/M2 | DIASTOLIC BLOOD PRESSURE: 67 MMHG | SYSTOLIC BLOOD PRESSURE: 153 MMHG | HEIGHT: 65 IN

## 2021-04-27 DIAGNOSIS — D47.2 MONOCLONAL GAMMOPATHY: Primary | ICD-10-CM

## 2021-04-27 PROCEDURE — 99212 OFFICE O/P EST SF 10 MIN: CPT

## 2021-04-27 PROCEDURE — 99214 OFFICE O/P EST MOD 30 MIN: CPT | Performed by: INTERNAL MEDICINE

## 2021-04-27 RX ORDER — LOSARTAN POTASSIUM 100 MG/1
100 TABLET ORAL DAILY
COMMUNITY
End: 2021-04-27 | Stop reason: SDUPTHER

## 2021-04-27 RX ORDER — LOSARTAN POTASSIUM 100 MG/1
100 TABLET ORAL DAILY
Qty: 30 TABLET | Refills: 11 | Status: SHIPPED
Start: 2021-04-27 | End: 2021-05-05 | Stop reason: ALTCHOICE

## 2021-04-27 NOTE — TELEPHONE ENCOUNTER
Chart amended to reflect dose adjustment. Losartan e-scribed to pharmacy. Order placed for BMP and will be done at her visit on 5/5/21.

## 2021-04-27 NOTE — PROGRESS NOTES
[address] MED ONCOLOGY  [address] [facility] [address]  Dept: [phone]  Attending Progress Note      Reason for Visit:   Monoclonal gammopathy. Referring Physician:  Mesfin Fried MD    PCP:  Collin Henderson MD    History of Present Illness: The patient is a very pleasant 33-year-old lady, with a past medical history significant for hypertension, hypothyroidism, migraine headache, she is a retired RN, she was seen by neurology for peripheral neuropathy, she had a work-up done, including SPEP with BERE, revealing minor/faint band/minute amount of IgG kappa monoclonal protein, M spike 0.4G/DL. The patient has pain from peripheral neuropathy, also pain in the lateral aspect of the right thigh, she is following with pain management. She had an injection done recently, but did not help with the pain. No history of anemia, kidney disease or hypercalcemia. Review of Systems;  CONSTITUTIONAL: No fever, chills. Fair appetite and energy level. ENMT: Eyes: No diplopia; Nose: No epistaxis. Mouth: No sore throat. RESPIRATORY: No hemoptysis, shortness of breath, cough. CARDIOVASCULAR: No chest pain, palpitations. GASTROINTESTINAL: No nausea/vomiting, abdominal pain, diarrhea/constipation. GENITOURINARY: No dysuria, urinary frequency, hematuria. NEURO: No syncope, presyncope, pos for migraine headache. Peripheral neuropathy.   Remainder:  ROS NEGATIVE    Past Medical History:      Diagnosis Date    Abdominal pain     Arthritis     l knee injections with dr Miguelito Shirley    Chest pain     resolved    Difficult intubation 1-    document on chart    Hypertension     Hypothyroidism     Low back pain     Migraine headache     Neuropathy     sensory in feet     Osteopenia     Palpitation     resolved    Plantar fasciitis     Skipped heart beats     Tingling of both feet     Tinnitus     hissing bilateral     Patient Active Problem List Diagnosis    Chest pain    Hyperlipidemia    Migraine headache    Plantar fasciitis    Osteopenia    Varicose veins of both lower extremities    Venous insufficiency of both lower extremities    Hypothyroidism    Left arm pain    Neuralgia and neuritis    Carpal tunnel syndrome of right wrist    Lumbar radiculopathy    Type 2 diabetes mellitus without complication, without long-term current use of insulin (HCC)    Numbness and tingling    Chronic pain syndrome    Lumbar facet arthropathy    Lumbar disc disorder    Spinal stenosis of lumbar region without neurogenic claudication        Past Surgical History:      Procedure Laterality Date    BLADDER SURGERY      mesh    BREAST RECONSTRUCTION Bilateral 2004, 2007    COLONOSCOPY  10/13/2020    DILATION AND CURETTAGE OF UTERUS      EYE SURGERY Bilateral     cataract removal    INCONTINENCE SURGERY      MASTECTOMY Bilateral 1982    for fibrocystic breast disease; has saline implants    PAIN MANAGEMENT PROCEDURE Right 4/12/2021    LUMBAR EPIDURAL STEROID INJECTION L5-S1 RIGHT PARAMEDIAN WITH 80 DEPO **ALLERGIC TO IV DYE** performed by Dylan Patrick MD at [address] [place] [address] ENDOVENOUS RF, 1ST VEIN Right 8/10/2018    RIGHT GREASTER SAPHENOUS VEIN STAB PHLEBECTOMIES performed by Brayden Flowers MD at [facility] TONSILLECTOMY         Family History:  Family History   Problem Relation Age of Onset    Kidney Disease Mother     Heart Disease Father     Coronary Art Dis Father     Coronary Art Dis Maternal Grandfather     Cancer Paternal Uncle         unknown       Medications:  Reviewed and reconciled.     Social History:  Social History     Socioeconomic History    Marital status:      Spouse name: Not on file    Number of children: Not on file    Years of education: Not on file    Highest education level: Not on file   Occupational History    Not on file   Social Needs    Financial resource strain: Not on file   [place] Soft. Non-tender, non-distended. Positive bowel sounds. EXTREMITIES: Without clubbing, cyanosis, or edema. NEUROLOGIC: No focal deficits. ECOG PS 1    Impression/Plan:        The patient is a very pleasant 53-year-old lady, with a past medical history significant for hypertension, hypothyroidism, migraine headache, she is a retired RN, she was seen by neurology for peripheral neuropathy, she had a work-up done, including SPEP with BERE, revealing minor/faint band/minute amount of IgG kappa monoclonal protein, M spike 0.4G/DL. IgA 118, IgG 779, IgM 106. Patient with IgG kappa monoclonal gammopathy, the patient does not have anemia, hypercalcemia or kidney dysfunction, a work-up was ordered, the M spike is small, 0.3G/DL, IgA 131, IgG 907, IgM 114. Kappa one 8.76, lambda one 2.34, kappa to lambda ratio 1.52, beta-2 microglobulin is 2.2, a skeletal survey was done on 3/30/2021, revealing osteopenia, no focal lytic lesions, small sclerotic lesion in the right iliac bone, likely a bone island. The results were reviewed with the patient. 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 We will follow her closely, she will have SPEP, immunofixation, serum light chain assay, quantitative immunoglobulins and beta-2 microglobulin done in 3 months. RTC in 3 months, with blood work 1 week prior. Thank you for allowing us to participate in the care of Mrs. Nolberto Morton.     Gallo Ramírez MD   HEMATOLOGY/MEDICAL ONCOLOGY  [address] [place] [address] ONCOLOGY  [address] [facility] [address] [facility] [address]  Dept: [phone]

## 2021-05-05 ENCOUNTER — OFFICE VISIT (OUTPATIENT)
Dept: CARDIOLOGY CLINIC | Age: 86
End: 2021-05-05
Payer: MEDICARE

## 2021-05-05 VITALS
HEART RATE: 62 BPM | HEIGHT: 66 IN | SYSTOLIC BLOOD PRESSURE: 130 MMHG | BODY MASS INDEX: 27.76 KG/M2 | RESPIRATION RATE: 16 BRPM | WEIGHT: 172.7 LBS | DIASTOLIC BLOOD PRESSURE: 52 MMHG

## 2021-05-05 DIAGNOSIS — I83.93 VARICOSE VEINS OF BOTH LOWER EXTREMITIES, UNSPECIFIED WHETHER COMPLICATED: ICD-10-CM

## 2021-05-05 DIAGNOSIS — E78.5 HYPERLIPIDEMIA, UNSPECIFIED HYPERLIPIDEMIA TYPE: ICD-10-CM

## 2021-05-05 DIAGNOSIS — R07.9 CHEST PAIN, UNSPECIFIED TYPE: ICD-10-CM

## 2021-05-05 DIAGNOSIS — I25.5 ISCHEMIC CARDIOMYOPATHY: ICD-10-CM

## 2021-05-05 DIAGNOSIS — I25.10 CORONARY ARTERY DISEASE INVOLVING NATIVE CORONARY ARTERY OF NATIVE HEART WITHOUT ANGINA PECTORIS: ICD-10-CM

## 2021-05-05 DIAGNOSIS — I87.2 VENOUS INSUFFICIENCY OF BOTH LOWER EXTREMITIES: Primary | ICD-10-CM

## 2021-05-05 PROCEDURE — 99214 OFFICE O/P EST MOD 30 MIN: CPT | Performed by: INTERNAL MEDICINE

## 2021-05-05 PROCEDURE — 93000 ELECTROCARDIOGRAM COMPLETE: CPT | Performed by: INTERNAL MEDICINE

## 2021-05-05 RX ORDER — METOPROLOL SUCCINATE 50 MG/1
50 TABLET, EXTENDED RELEASE ORAL 2 TIMES DAILY
Qty: 180 TABLET | Refills: 3 | Status: SHIPPED
Start: 2021-05-05 | End: 2022-01-28 | Stop reason: SDUPTHER

## 2021-05-05 RX ORDER — LOSARTAN POTASSIUM AND HYDROCHLOROTHIAZIDE 12.5; 1 MG/1; MG/1
1 TABLET ORAL DAILY
Qty: 90 TABLET | Refills: 3 | Status: SHIPPED
Start: 2021-05-05 | End: 2022-01-28 | Stop reason: SDUPTHER

## 2021-05-05 NOTE — PROGRESS NOTES
OUTPATIENT CARDIOLOGY FOLLOW-UP    Name: Romel Arce    Age: 82 y.o. Primary Care Physician: Mary Kay Reid MD    Date of Service: 5/5/2021    Chief Complaint:   Chief Complaint   Patient presents with    Hyperlipidemia     3 month ov-       Interim History:   Mrs. Gabi Erazo is a 82-year-old female history of TIA, symptomatic palpitations, migraines, mild hyperlipidemia, hypothyroidism, history of breast cancer status post the mastectomy and reconstructive surgery is here for follow-up visit. She was seen in the office on 11/2/2020. Since her last visit, she has not had any ER visits or hospitalizations for cardiac related problems. She had a colonoscopy and was noted to have a polyp which was resected. She has been having swelling of the right leg more than the left without dyspnea, orthopnea, PND. She is feeling much better in terms of chest tightness and palpitations ever since she was started on Imdur. Her last lipid profile done on 5/5/2020 showed total cholesterol of 146 LDL 61 triglycerides 108 and HDL 63. She stopped atorvastatin in January 2021 due to tingling in her feet but not muscle pains. Now her blood pressures have been well controlled. Her losartan was increased to 100 mg daily along with hydrochlorothiazide 12.5 mg. No new cardiac complaints since last cardiology evaluation. She still gets occasional  Chest pain and that lasts for less than 5 min and did not notice any aggravating relieving factors and the pain goes away spontaneously. This happens both at rest as well as with activity. Last episode of chest pain she had was about 2 weeks back. She denies recent chest pain, SOB, palpitations, lightheadedness, dizziness, syncope, PND, or orthopnea. SR on EKG.     Review of Systems:   Cardiac: As per HPI  General: No fever, chills  Pulmonary: As per HPI  HEENT: No visual disturbances, difficult swallowing  GI: No nausea, vomiting  Endocrine: No thyroid disease or Not on file     Inability: Not on file    Transportation needs     Medical: Not on file     Non-medical: Not on file   Tobacco Use    Smoking status: Never Smoker    Smokeless tobacco: Never Used   Substance and Sexual Activity    Alcohol use: No    Drug use: No    Sexual activity: Not Currently     Partners: Male   Lifestyle    Physical activity     Days per week: Not on file     Minutes per session: Not on file    Stress: Not on file   Relationships    Social connections     Talks on phone: Not on file     Gets together: Not on file     Attends Sikhism service: Not on file     Active member of club or organization: Not on file     Attends meetings of clubs or organizations: Not on file     Relationship status: Not on file    Intimate partner violence     Fear of current or ex partner: Not on file     Emotionally abused: Not on file     Physically abused: Not on file     Forced sexual activity: Not on file   Other Topics Concern    Not on file   Social History Narrative    Not on file       Allergies: Allergies   Allergen Reactions    Iv Dye [Iodides] Hives    Pcn [Penicillins] Rash    Norvasc [Amlodipine] Swelling    Aspirin Other (See Comments)     tingling    Bactrim Other (See Comments)     Yeast infection    Drixoral [Dexbrompheniramine-Pseudoeph] Other (See Comments)     hyper    Morphine Other (See Comments)     Iv line vein reddened       Current Medications:  Current Outpatient Medications   Medication Sig Dispense Refill    losartan (COZAAR) 100 MG tablet Take 1 tablet by mouth daily 30 tablet 11    isosorbide mononitrate (IMDUR) 30 MG extended release tablet Take 1 tablet by mouth daily 30 tablet 11    hydroCHLOROthiazide (MICROZIDE) 12.5 MG capsule Take 1 capsule by mouth every morning 30 capsule 0    gabapentin (NEURONTIN) 300 MG capsule Take 1 capsule by mouth 3 times daily for 30 days.  Intended supply: 90 days 90 capsule 0    Ca & Phos-Vit D-Mag [phone] TABS Take 1 tablet by mouth 2 times daily       Multiple Vitamins-Minerals (THERAPEUTIC MULTIVITAMIN-MINERALS) tablet Take 1 tablet by mouth daily      metoprolol succinate (TOPROL XL) 50 MG extended release tablet Take 1 tablet by mouth 2 times daily 180 tablet 3    calcitonin (MIACALCIN) 200 UNIT/ACT nasal spray 1 spray by Nasal route daily       vitamin D (CHOLECALCIFEROL) 1000 UNIT TABS tablet Take 1,000 Int'l Units by mouth daily Takes two tabs daily      levothyroxine (SYNTHROID) 75 MCG tablet Take 75 mcg by mouth Daily       amitriptyline (ELAVIL) 25 MG tablet Take 25 mg by mouth nightly        No current facility-administered medications for this visit. Physical Exam:  BP (!) 130/52   Pulse 62   Resp 16   Ht 5' 5.5\" (1.664 m)   Wt 172 lb 11.2 oz (78.3 kg)   BMI 28.30 kg/m²   Wt Readings from Last 3 Encounters:   05/05/21 172 lb 11.2 oz (78.3 kg)   04/27/21 169 lb (76.7 kg)   04/23/21 170 lb (77.1 kg)     Appearance: Awake, alert and oriented x 3, no acute respiratory distress  Skin: Intact, no rash  Head: Normocephalic, atraumatic  Eyes: EOMI, no conjunctival erythema  ENMT: No pharyngeal erythema, MMM, no rhinorrhea  Neck: Supple, no elevated JVP, no carotid bruits  Lungs: Clear to auscultation bilaterally. No wheezes, rales, or rhonchi.   Cardiac: Regular rate and rhythm, +S1S2, no murmurs apparent  Abdomen: Soft, nontender, +bowel sounds  Extremities: Moves all extremities x 4, trace to 1+ lower extremity edema right more than left  Neurologic: No focal motor deficits apparent, normal mood and affect, alert and oriented x 3  Peripheral Pulses: Intact posterior tibial pulses bilaterally    Laboratory Tests:  Lab Results   Component Value Date    CREATININE 0.9 04/14/2021    BUN 21 04/14/2021     04/14/2021    K 4.4 04/14/2021     04/14/2021    CO2 25 04/14/2021     No results found for: MG  Lab Results   Component Value Date    WBC 14.2 (H) 03/30/2021    HGB 14.7 03/30/2021    HCT 45.3

## 2021-05-05 NOTE — PATIENT INSTRUCTIONS
1. Continue metoprolol 50 mg twice a day, Imdur 30 mg po daily  2. We will change losartan hydrochlorothiazide to losartan/HCTZ 100/12.5 mg 1 p.o. daily for hypertension. 3. She was advised to restart atorvastatin 20 mg p.o. daily. 4. Continue regular exercise   5. She was advised to continue using compression stockings  6. BMP today. 7. Follow up with me in 3 months.

## 2021-05-07 ENCOUNTER — TELEPHONE (OUTPATIENT)
Dept: ADMINISTRATIVE | Age: 86
End: 2021-05-07

## 2021-05-07 DIAGNOSIS — I10 HYPERTENSION, UNSPECIFIED TYPE: ICD-10-CM

## 2021-05-07 LAB
ANION GAP SERPL CALCULATED.3IONS-SCNC: 10 MMOL/L (ref 7–16)
BUN BLDV-MCNC: 20 MG/DL (ref 6–23)
CALCIUM SERPL-MCNC: 9.8 MG/DL (ref 8.6–10.2)
CHLORIDE BLD-SCNC: 97 MMOL/L (ref 98–107)
CO2: 29 MMOL/L (ref 22–29)
CREAT SERPL-MCNC: 0.9 MG/DL (ref 0.5–1)
GFR AFRICAN AMERICAN: >60
GFR NON-AFRICAN AMERICAN: 60 ML/MIN/1.73
GLUCOSE BLD-MCNC: 83 MG/DL (ref 74–99)
POTASSIUM SERPL-SCNC: 4.3 MMOL/L (ref 3.5–5)
SODIUM BLD-SCNC: 136 MMOL/L (ref 132–146)

## 2021-06-23 ENCOUNTER — OFFICE VISIT (OUTPATIENT)
Dept: PAIN MANAGEMENT | Age: 86
End: 2021-06-23
Payer: MEDICARE

## 2021-06-23 VITALS
OXYGEN SATURATION: 98 % | SYSTOLIC BLOOD PRESSURE: 120 MMHG | TEMPERATURE: 95.5 F | RESPIRATION RATE: 16 BRPM | BODY MASS INDEX: 27.64 KG/M2 | DIASTOLIC BLOOD PRESSURE: 54 MMHG | WEIGHT: 172 LBS | HEART RATE: 62 BPM | HEIGHT: 66 IN

## 2021-06-23 DIAGNOSIS — M79.2 NEURALGIA AND NEURITIS: ICD-10-CM

## 2021-06-23 DIAGNOSIS — G89.4 CHRONIC PAIN SYNDROME: Primary | ICD-10-CM

## 2021-06-23 DIAGNOSIS — M47.816 LUMBAR SPONDYLOSIS: ICD-10-CM

## 2021-06-23 DIAGNOSIS — M47.816 LUMBAR FACET ARTHROPATHY: ICD-10-CM

## 2021-06-23 DIAGNOSIS — M51.9 LUMBAR DISC DISORDER: ICD-10-CM

## 2021-06-23 DIAGNOSIS — M48.061 SPINAL STENOSIS OF LUMBAR REGION WITHOUT NEUROGENIC CLAUDICATION: ICD-10-CM

## 2021-06-23 PROCEDURE — 99213 OFFICE O/P EST LOW 20 MIN: CPT | Performed by: PAIN MEDICINE

## 2021-06-23 RX ORDER — PREDNISONE 50 MG/1
TABLET ORAL
COMMUNITY
Start: 2021-04-27 | End: 2021-06-23

## 2021-06-23 RX ORDER — ATORVASTATIN CALCIUM 20 MG/1
20 TABLET, FILM COATED ORAL DAILY
COMMUNITY
Start: 2021-06-21 | End: 2022-01-28 | Stop reason: SDUPTHER

## 2021-06-23 RX ORDER — METHENAMINE, SODIUM PHOSPHATE, MONOBASIC, MONOHYDRATE, PHENYL SALICYLATE, METHYLENE BLUE, AND HYOSCYAMINE SULFATE 120; 40.8; 36; 10; .12 MG/1; MG/1; MG/1; MG/1; MG/1
CAPSULE ORAL
COMMUNITY
Start: 2021-06-21 | End: 2021-06-23

## 2021-06-23 RX ORDER — GABAPENTIN 400 MG/1
400 CAPSULE ORAL 3 TIMES DAILY
Qty: 90 CAPSULE | Refills: 1 | Status: SHIPPED
Start: 2021-06-23 | End: 2021-08-24 | Stop reason: SDUPTHER

## 2021-06-23 RX ORDER — GABAPENTIN 400 MG/1
400 CAPSULE ORAL 3 TIMES DAILY
Qty: 90 CAPSULE | Refills: 0
Start: 2021-06-23 | End: 2021-06-23 | Stop reason: SDUPTHER

## 2021-06-23 NOTE — PROGRESS NOTES
Do you currently have any of the following:    Fever: No  Headache:  No  Cough: No  Shortness of breath: No  Exposed to anyone with these symptoms: No                                                                                                                Brendolyn Ards presents to the [facility] on 6/23/2021. Aracely Kramer is complaining of pain in her lower back. . The pain is constant. The pain is described as feels like ants crawling up her legs. . Pain is rated on her best day at a 1, on her worst day at a 7, and on average at a 4 on the VAS scale. She took her last dose of Neurontin . Aracely Kramer does not have issues with constipation. Any procedures since your last visit: No,     She is not on NSAIDS and  is not on anticoagulation medications to include none and is managed by NA. Pacemaker or defibrillator: No Physician managing device is NA. Medication Contract and Consent for Opioid Use Documents Filed      No documents found                   BP (!) 120/54   Pulse 62   Temp 95.5 °F (35.3 °C) (Infrared)   Resp 16   Ht 5' 5.5\" (1.664 m)   Wt 172 lb (78 kg)   SpO2 98%   BMI 28.19 kg/m²      No LMP recorded.  Patient is postmenopausal.

## 2021-06-23 NOTE — PROGRESS NOTES
[address]  [address] [facility], [address] [place] [address] [place]  [phone]    Follow up Note      Yarelis Rizzo     Date of Visit:  6/23/2021    CC:  Patient presents for follow up   Chief Complaint   Patient presents with    Lower Back Pain     2 month follow up     HPI:    Pain is tolerable  Appropriate analgesia with current medications regimen: yes - . Change in quality of symptoms:none  Medication side effects:none. Recent diagnostic testing:none. Recent interventional procedures:none    She has not been on anticoagulation medications to include none. The patient  has not been on herbal supplements. The patient is not diabetic.     Imaging:   Lumbar spine MRI   Multilevel lumbar spondylosis most pronounced at L3-4, there is diffuse disc bulge with facet hypertrophy resulting in moderate central canal and bilateral foraminal narrowing. Mild right foraminal narrowing at L2-3 and mild bilateral foraminal narrowing at L4-5 and L5-S1.     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     The study also reveals evidence of chronic neurogenic changes in the right S1 myotome indicative of a right chronic S1 radiculopathy.     Previous treatments: Physical Therapy and medications. .         Potential Aberrant Drug-Related Behavior:  No      Urine Drug Screening:  None    OARRS report:  06/2021 consistent    Opioid Agreement:  Date enacted:N/A  Renewal date:N/A    Past Medical History:   Diagnosis Date    Abdominal pain     Arthritis     l knee injections with dr Davin Quevedo    Chest pain     resolved    Difficult intubation 1-    document on chart    Hypertension     Hypothyroidism     Low back pain     Migraine headache     Neuropathy sensory in feet     Osteopenia     Palpitation     resolved    Plantar fasciitis     Skipped heart beats     Tingling of both feet     Tinnitus     hissing bilateral     Past Surgical History:   Procedure Laterality Date    BLADDER SURGERY      mesh    BREAST RECONSTRUCTION Bilateral 2004, 2007    COLONOSCOPY  10/13/2020    DILATION AND CURETTAGE OF UTERUS      EYE SURGERY Bilateral     cataract removal    INCONTINENCE SURGERY      MASTECTOMY Bilateral 1982    for fibrocystic breast disease; has saline implants    PAIN MANAGEMENT PROCEDURE Right 4/12/2021    LUMBAR EPIDURAL STEROID INJECTION L5-S1 RIGHT PARAMEDIAN WITH 80 DEPO **ALLERGIC TO IV DYE** performed by Alvarez Burdick MD at [facility] ENDOVENOUS RF, 1ST VEIN Right 8/10/2018    RIGHT GREASTER SAPHENOUS VEIN STAB PHLEBECTOMIES performed by Foster Hernandez MD at [facility]       Prior to Admission medications    Medication Sig Start Date End Date Taking?  Authorizing Provider   atorvastatin (LIPITOR) 20 MG tablet  6/21/21  Yes Historical Provider, MD   mirabegron (MYRBETRIQ) 50 MG TB24 Take 50 mg by mouth daily   Yes Historical Provider, MD   losartan-hydroCHLOROthiazide (HYZAAR) 100-12.5 MG per tablet Take 1 tablet by mouth daily 5/5/21  Yes Mirian Urias MD   metoprolol succinate (TOPROL XL) 50 MG extended release tablet Take 1 tablet by mouth 2 times daily 5/5/21  Yes Mirian Urias MD   isosorbide mononitrate (IMDUR) 30 MG extended release tablet Take 1 tablet by mouth daily 4/26/21  Yes Mirian Urias MD   Ca & Phos-Vit D-Mag [phone] TABS Take 1 tablet by mouth 2 times daily    Yes Historical Provider, MD   Multiple Vitamins-Minerals (THERAPEUTIC MULTIVITAMIN-MINERALS) tablet Take 1 tablet by mouth daily   Yes Historical Provider, MD   calcitonin (MIACALCIN) 200 UNIT/ACT nasal spray 1 spray by Nasal route daily  4/6/16  Yes Historical Provider, MD   vitamin D (CHOLECALCIFEROL) 1000 UNIT TABS tablet Take 1,000 Int'l Units by mouth daily Takes two tabs daily   Yes Historical Provider, MD   levothyroxine (SYNTHROID) 75 MCG tablet Take 75 mcg by mouth Daily    Yes Historical Provider, MD   amitriptyline (ELAVIL) 25 MG tablet Take 25 mg by mouth nightly    Yes Historical Provider, MD   gabapentin (NEURONTIN) 300 MG capsule Take 1 capsule by mouth 3 times daily for 30 days. Intended supply: 90 days 3/23/21 5/5/21  Lazarus Joseph, MD     Allergies   Allergen Reactions    Iv Dye [Iodides] Hives    Pcn [Penicillins] Rash    Norvasc [Amlodipine] Swelling    Aspirin Other (See Comments)     tingling    Bactrim Other (See Comments)     Yeast infection    Drixoral [Dexbrompheniramine-Pseudoeph] Other (See Comments)     hyper    Morphine Other (See Comments)     Iv line vein reddened     Social History     Socioeconomic History    Marital status:      Spouse name: Not on file    Number of children: Not on file    Years of education: Not on file    Highest education level: Not on file   Occupational History    Not on file   Tobacco Use    Smoking status: Never Smoker    Smokeless tobacco: Never Used   Vaping Use    Vaping Use: Never used   Substance and Sexual Activity    Alcohol use: No    Drug use: No    Sexual activity: Not Currently     Partners: Male   Other Topics Concern    Not on file   Social History Narrative    Not on file     Social Determinants of Health     Financial Resource Strain:     Difficulty of Paying Living Expenses:    Food Insecurity:     Worried About Running Out of Food in the Last Year:     Ran Out of Food in the Last Year:    Transportation Needs:     Lack of Transportation (Medical):      Lack of Transportation (Non-Medical):    Physical Activity:     Days of Exercise per Week:     Minutes of Exercise per Session:    Stress:     Feeling of Stress :    Social Connections:     Frequency of Communication with Friends and Family:     Frequency of Social Gatherings with Friends and Family:     Attends Orthodoxy Services:     Active Member of Clubs or Organizations:     Attends Club or Organization Meetings:     Marital Status:    Intimate Partner Violence:     Fear of Current or Ex-Partner:     Emotionally Abused:     Physically Abused:     Sexually Abused:      Family History   Problem Relation Age of Onset    Kidney Disease Mother     Heart Disease Father     Coronary Art Dis Father     Coronary Art Dis Maternal Grandfather     Cancer Paternal Uncle         unknown     REVIEW OF SYSTEMS:     Joleen Mcdonnell denies fever/chills, chest pain, shortness of breath, new bowel or bladder complaints. All other review of systems was negative. PHYSICAL EXAMINATION:      BP (!) 120/54   Pulse 62   Temp 95.5 °F (35.3 °C) (Infrared)   Resp 16   Ht 5' 5.5\" (1.664 m)   Wt 172 lb (78 kg)   SpO2 98%   BMI 28.19 kg/m²     General:       General appearance:   elderly, pleasant and well-hydrated. , in mild discomfort and A & O x3  Build:Overweight     HEENT:     Head:normocephalic and atraumatic  Sclera: icterus absent,      Lungs:     Breathing:Breathing Pattern: regular, no distress     Abdomen:     Shape:non-distended and normal  Tenderness:none     Lumbar spine:     Spine inspection:scoliosis   CVA tenderness:No   Palpation:tenderness paravertebral muscles, facet loading, left, right, positive and tenderness. Range of motion:abnormal moderately Lateral bending, flexion, extension rotation bilateral and is  painful.     Musculoskeletal:     Trigger points in Paraveteral:absent bilaterally  SI joint tenderness:negative right, negative left  Trochanteric bursa tenderness:positive right, positive left  SLR:negative right, negative left, sitting      Extremities:     Tremors:None bilaterally upper and lower  Range of motion: pain with internal rotation of hips negative.   Intact:Yes  Edema:+1 pitting bilaterally     Neurological:     Sensory:normal to light touch

## 2021-07-20 DIAGNOSIS — D47.2 MONOCLONAL GAMMOPATHY: ICD-10-CM

## 2021-07-20 LAB
ANION GAP SERPL CALCULATED.3IONS-SCNC: 12 MMOL/L (ref 7–16)
BUN BLDV-MCNC: 24 MG/DL (ref 6–23)
CALCIUM SERPL-MCNC: 9.5 MG/DL (ref 8.6–10.2)
CHLORIDE BLD-SCNC: 99 MMOL/L (ref 98–107)
CO2: 24 MMOL/L (ref 22–29)
CREAT SERPL-MCNC: 0.9 MG/DL (ref 0.5–1)
GFR AFRICAN AMERICAN: >60
GFR NON-AFRICAN AMERICAN: 60 ML/MIN/1.73
GLUCOSE BLD-MCNC: 150 MG/DL (ref 74–99)
POTASSIUM SERPL-SCNC: 4.1 MMOL/L (ref 3.5–5)
SODIUM BLD-SCNC: 135 MMOL/L (ref 132–146)

## 2021-07-21 LAB
ALBUMIN SERPL-MCNC: 3.6 G/DL (ref 3.5–4.7)
ALPHA-1-GLOBULIN: 0.2 G/DL (ref 0.2–0.4)
ALPHA-2-GLOBULIN: 1 G/FL (ref 0.5–1)
BETA GLOBULIN: 0.9 G/DL (ref 0.8–1.3)
ELECTROPHORESIS: NORMAL
GAMMA GLOBULIN: 1.2 G/DL (ref 0.7–1.6)
IGA: 133 MG/DL (ref 70–400)
IGG: 901 MG/DL (ref 700–1600)
IGM: 105 MG/DL (ref 40–230)
IMMUNOFIXATION RESULT, SERUM: NORMAL
TOTAL PROTEIN: 7 G/DL (ref 6.4–8.3)

## 2021-07-22 LAB — BETA-2 MICROGLOBULIN: 2.2 MG/L (ref 0.6–2.4)

## 2021-07-23 LAB
KAPPA FREE LIGHT CHAINS QNT: 21.95 MG/L (ref 3.3–19.4)
KAPPA/LAMBDA FREE LIGHT CHAIN RATIO: 1.55 (ref 0.26–1.65)
LAMBDA FREE LIGHT CHAINS QNT: 14.18 MG/L (ref 5.71–26.3)

## 2021-07-26 DIAGNOSIS — D47.2 MONOCLONAL GAMMOPATHY: Primary | ICD-10-CM

## 2021-07-27 ENCOUNTER — OFFICE VISIT (OUTPATIENT)
Dept: ONCOLOGY | Age: 86
End: 2021-07-27
Payer: MEDICARE

## 2021-07-27 ENCOUNTER — HOSPITAL ENCOUNTER (OUTPATIENT)
Dept: INFUSION THERAPY | Age: 86
Discharge: HOME | End: 2021-07-27
Payer: MEDICARE

## 2021-07-27 VITALS
SYSTOLIC BLOOD PRESSURE: 119 MMHG | HEART RATE: 58 BPM | HEIGHT: 66 IN | BODY MASS INDEX: 28.01 KG/M2 | WEIGHT: 174.3 LBS | TEMPERATURE: 97.3 F | OXYGEN SATURATION: 97 % | RESPIRATION RATE: 16 BRPM | DIASTOLIC BLOOD PRESSURE: 57 MMHG

## 2021-07-27 DIAGNOSIS — D47.2 MONOCLONAL GAMMOPATHY: Primary | ICD-10-CM

## 2021-07-27 DIAGNOSIS — D47.2 MONOCLONAL GAMMOPATHY: ICD-10-CM

## 2021-07-27 LAB
ALBUMIN SERPL-MCNC: 3.9 G/DL (ref 3.5–5.2)
ALP BLD-CCNC: 100 U/L (ref 35–104)
ALT SERPL-CCNC: 19 U/L (ref 0–32)
ANION GAP SERPL CALCULATED.3IONS-SCNC: 7 MMOL/L (ref 7–16)
AST SERPL-CCNC: 16 U/L (ref 0–31)
BASOPHILS ABSOLUTE: 0.04 E9/L (ref 0–0.2)
BASOPHILS RELATIVE PERCENT: 0.4 % (ref 0–2)
BILIRUB SERPL-MCNC: 0.4 MG/DL (ref 0–1.2)
BUN BLDV-MCNC: 24 MG/DL (ref 6–23)
CALCIUM SERPL-MCNC: 9.5 MG/DL (ref 8.6–10.2)
CHLORIDE BLD-SCNC: 101 MMOL/L (ref 98–107)
CO2: 29 MMOL/L (ref 22–29)
CREAT SERPL-MCNC: 1.1 MG/DL (ref 0.5–1)
EOSINOPHILS ABSOLUTE: 0.21 E9/L (ref 0.05–0.5)
EOSINOPHILS RELATIVE PERCENT: 2.1 % (ref 0–6)
GFR AFRICAN AMERICAN: 57
GFR NON-AFRICAN AMERICAN: 47 ML/MIN/1.73
GLUCOSE BLD-MCNC: 83 MG/DL (ref 74–99)
HCT VFR BLD CALC: 38.2 % (ref 34–48)
HEMOGLOBIN: 12.6 G/DL (ref 11.5–15.5)
IMMATURE GRANULOCYTES #: 0.07 E9/L
IMMATURE GRANULOCYTES %: 0.7 % (ref 0–5)
LACTATE DEHYDROGENASE: 168 U/L (ref 135–214)
LYMPHOCYTES ABSOLUTE: 2.69 E9/L (ref 1.5–4)
LYMPHOCYTES RELATIVE PERCENT: 27 % (ref 20–42)
MCH RBC QN AUTO: 30.1 PG (ref 26–35)
MCHC RBC AUTO-ENTMCNC: 33 % (ref 32–34.5)
MCV RBC AUTO: 91.4 FL (ref 80–99.9)
MONOCYTES ABSOLUTE: 1.1 E9/L (ref 0.1–0.95)
MONOCYTES RELATIVE PERCENT: 11 % (ref 2–12)
NEUTROPHILS ABSOLUTE: 5.86 E9/L (ref 1.8–7.3)
NEUTROPHILS RELATIVE PERCENT: 58.8 % (ref 43–80)
PDW BLD-RTO: 13.6 FL (ref 11.5–15)
PLATELET # BLD: 226 E9/L (ref 130–450)
PMV BLD AUTO: 10 FL (ref 7–12)
POTASSIUM SERPL-SCNC: 4.4 MMOL/L (ref 3.5–5)
RBC # BLD: 4.18 E12/L (ref 3.5–5.5)
SODIUM BLD-SCNC: 137 MMOL/L (ref 132–146)
WBC # BLD: 10 E9/L (ref 4.5–11.5)

## 2021-07-27 PROCEDURE — 83615 LACTATE (LD) (LDH) ENZYME: CPT

## 2021-07-27 PROCEDURE — 80053 COMPREHEN METABOLIC PANEL: CPT

## 2021-07-27 PROCEDURE — 99214 OFFICE O/P EST MOD 30 MIN: CPT | Performed by: INTERNAL MEDICINE

## 2021-07-27 PROCEDURE — 82232 ASSAY OF BETA-2 PROTEIN: CPT

## 2021-07-27 PROCEDURE — 82784 ASSAY IGA/IGD/IGG/IGM EACH: CPT

## 2021-07-27 PROCEDURE — 36415 COLL VENOUS BLD VENIPUNCTURE: CPT

## 2021-07-27 PROCEDURE — 86334 IMMUNOFIX E-PHORESIS SERUM: CPT

## 2021-07-27 PROCEDURE — 84165 PROTEIN E-PHORESIS SERUM: CPT

## 2021-07-27 PROCEDURE — 85025 COMPLETE CBC W/AUTO DIFF WBC: CPT

## 2021-07-27 PROCEDURE — 83883 ASSAY NEPHELOMETRY NOT SPEC: CPT

## 2021-07-27 NOTE — PROGRESS NOTES
[address] MED ONCOLOGY  [address] [facility] [address]  Dept: [phone]  Attending Progress Note      Reason for Visit:   Monoclonal gammopathy. Referring Physician:  Reina Wilburn MD    PCP:  Jarod Byers MD    History of Present Illness: The patient is a very pleasant 51-year-old lady, with a past medical history significant for hypertension, hypothyroidism, migraine headache, she is a retired RN, she was seen by neurology for peripheral neuropathy, she had a work-up done, including SPEP with BERE, revealing minor/faint band/minute amount of IgG kappa monoclonal protein, M spike 0.4G/DL. The patient has pain from peripheral neuropathy, overall stable. Also pain in the lateral aspect of the right thigh. No history of anemia, kidney disease or hypercalcemia. New problems. Review of Systems;  CONSTITUTIONAL: No fever, chills. Fair appetite and energy level. ENMT: Eyes: No diplopia; Nose: No epistaxis. Mouth: No sore throat. RESPIRATORY: No hemoptysis, shortness of breath, cough. CARDIOVASCULAR: No chest pain, palpitations. GASTROINTESTINAL: No nausea/vomiting, abdominal pain, diarrhea/constipation. GENITOURINARY: No dysuria, urinary frequency, hematuria. NEURO: No syncope, presyncope, pos for migraine headache. Peripheral neuropathy.   Remainder:  ROS NEGATIVE    Past Medical History:      Diagnosis Date    Abdominal pain     Arthritis     l knee injections with dr Celso Miller    Chest pain     resolved    Difficult intubation 1-    document on chart    Hypertension     Hypothyroidism     Low back pain     Migraine headache     Neuropathy     sensory in feet     Osteopenia     Palpitation     resolved    Plantar fasciitis     Skipped heart beats     Tingling of both feet     Tinnitus     hissing bilateral     Patient Active Problem List   Diagnosis    Chest pain    Hyperlipidemia    Migraine headache    Plantar fasciitis    Osteopenia    Varicose veins of both lower extremities    Venous insufficiency of both lower extremities    Hypothyroidism    Left arm pain    Neuralgia and neuritis    Carpal tunnel syndrome of right wrist    Lumbar radiculopathy    Type 2 diabetes mellitus without complication, without long-term current use of insulin (HCC)    Numbness and tingling    Chronic pain syndrome    Lumbar facet arthropathy    Lumbar disc disorder    Spinal stenosis of lumbar region without neurogenic claudication        Past Surgical History:      Procedure Laterality Date    BLADDER SURGERY      mesh    BREAST RECONSTRUCTION Bilateral 2004, 2007    COLONOSCOPY  10/13/2020    DILATION AND CURETTAGE OF UTERUS      EYE SURGERY Bilateral     cataract removal    INCONTINENCE SURGERY      MASTECTOMY Bilateral 1982    for fibrocystic breast disease; has saline implants    PAIN MANAGEMENT PROCEDURE Right 4/12/2021    LUMBAR EPIDURAL STEROID INJECTION L5-S1 RIGHT PARAMEDIAN WITH 80 DEPO **ALLERGIC TO IV DYE** performed by Lali Babb MD at [address] [place] [address] ENDOVENOUS RF, 1ST VEIN Right 8/10/2018    RIGHT GREASTER SAPHENOUS VEIN STAB PHLEBECTOMIES [facility] TONSILLECTOMY         Family History:  Family History   Problem Relation Age of Onset    Kidney Disease Mother     Heart Disease Father     Coronary Art Dis Father     Coronary Art Dis Maternal Grandfather     Cancer Paternal Uncle         unknown       Medications:  Reviewed and reconciled.     Social History:  Social History     Socioeconomic History    Marital status:      Spouse name: Not on file    Number of children: Not on file    Years of education: Not on file    Highest education level: Not on file   Occupational History    Not on file   Tobacco Use    Smoking status: Never Smoker    Smokeless tobacco: Never Used   Vaping Use    Vaping Use: Never used   Substance and Sexual Positive bowel sounds. EXTREMITIES: Without clubbing, cyanosis, or edema. NEUROLOGIC: No focal deficits. ECOG PS 1    Impression/Plan:        The patient is a very pleasant 74-year-old lady, with a past medical history significant for hypertension, hypothyroidism, migraine headache, she is a retired RN, she was seen by neurology for peripheral neuropathy, she had a work-up done, including SPEP with BERE, revealing minor/faint band/minute amount of IgG kappa monoclonal protein, M spike 0.4G/DL. IgA 118, IgG 779, IgM 106. Patient with IgG kappa monoclonal gammopathy, the patient does not have anemia, hypercalcemia or kidney dysfunction, a work-up was ordered, the M spike is small, 0.3G/DL, IgA 131, IgG 907, IgM 114. Kappa one 8.76, lambda one 2.34, kappa to lambda ratio 1.52, beta-2 microglobulin is 2.2, a skeletal survey was done on 3/30/2021, revealing osteopenia, no focal lytic lesions, small sclerotic lesion in the right iliac bone, likely a bone island. The results were reviewed with the patient. 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 On 7/20/2010 she had repeat SPEP with immunofixation, she has an IgG kappa monoclonal protein, M spike is stable at 0.3G/DL, kappa 21.95, lambda 14.18 with a normal ratio, quantitative immunoglobin's are all within normal range, creatinine is 0.9, calcium 9.5, she does not have anemia, leukocytosis had resolved. Recommended close follow-up, we will follow her closely, she will have SPEP, immunofixation, serum light chain assay, quantitative immunoglobulins and beta-2 microglobulin done in 3 months. RTC in 3 months, with blood work 1 week prior. Thank you for allowing us to participate in the care of Mrs. Kayley Hays.     Emanuel Hendricks MD   HEMATOLOGY/MEDICAL [address] [facility], [address] [place]  MED ONCOLOGY  [address] [phone]  Dept: [phone]

## 2021-07-28 LAB
ALBUMIN SERPL-MCNC: 3.3 G/DL (ref 3.5–4.7)
ALPHA-1-GLOBULIN: 0.2 G/DL (ref 0.2–0.4)
ALPHA-2-GLOBULIN: 0.9 G/FL (ref 0.5–1)
BETA GLOBULIN: 0.8 G/DL (ref 0.8–1.3)
ELECTROPHORESIS: ABNORMAL
GAMMA GLOBULIN: 0.9 G/DL (ref 0.7–1.6)
IGA: 107 MG/DL (ref 70–400)
IGG: 755 MG/DL (ref 700–1600)
IGM: 99 MG/DL (ref 40–230)
IMMUNOFIXATION RESULT, SERUM: NORMAL
TOTAL PROTEIN: 6.7 G/DL (ref 6.4–8.3)

## 2021-07-29 LAB
BETA-2 MICROGLOBULIN: 2.2 MG/L (ref 0.6–2.4)
KAPPA FREE LIGHT CHAINS QNT: 26.11 MG/L (ref 3.3–19.4)
KAPPA/LAMBDA FREE LIGHT CHAIN RATIO: 1.73 (ref 0.26–1.65)
LAMBDA FREE LIGHT CHAINS QNT: 15.05 MG/L (ref 5.71–26.3)

## 2021-07-30 DIAGNOSIS — D47.2 MONOCLONAL GAMMOPATHY: Primary | ICD-10-CM

## 2021-08-23 ENCOUNTER — OFFICE VISIT (OUTPATIENT)
Dept: CARDIOLOGY CLINIC | Age: 86
End: 2021-08-23
Payer: MEDICARE

## 2021-08-23 VITALS
HEIGHT: 65 IN | DIASTOLIC BLOOD PRESSURE: 60 MMHG | RESPIRATION RATE: 16 BRPM | SYSTOLIC BLOOD PRESSURE: 118 MMHG | HEART RATE: 64 BPM | BODY MASS INDEX: 29.29 KG/M2 | WEIGHT: 175.8 LBS

## 2021-08-23 DIAGNOSIS — E78.5 HYPERLIPIDEMIA, UNSPECIFIED HYPERLIPIDEMIA TYPE: Primary | ICD-10-CM

## 2021-08-23 DIAGNOSIS — R07.89 CHEST PAIN, ATYPICAL: ICD-10-CM

## 2021-08-23 PROCEDURE — 93000 ELECTROCARDIOGRAM COMPLETE: CPT | Performed by: INTERNAL MEDICINE

## 2021-08-23 PROCEDURE — 99214 OFFICE O/P EST MOD 30 MIN: CPT | Performed by: INTERNAL MEDICINE

## 2021-08-23 RX ORDER — VIBEGRON 75 MG/1
TABLET, FILM COATED ORAL DAILY
COMMUNITY

## 2021-08-23 NOTE — PROGRESS NOTES
Smoker    Smokeless tobacco: Never Used   Vaping Use    Vaping Use: Never used   Substance and Sexual Activity    Alcohol use: No    Drug use: No    Sexual activity: Not Currently     Partners: Male   Other Topics Concern    Not on file   Social History Narrative    Not on file     Social Determinants of Health     Financial Resource Strain:     Difficulty of Paying Living Expenses:    Food Insecurity:     Worried About Running Out of Food in the Last Year:     [address] in the Last Year:    Transportation Needs:     Lack of Transportation (Medical):  Lack of Transportation (Non-Medical):    Physical Activity:     Days of Exercise per Week:     Minutes of Exercise per Session:    Stress:     Feeling of Stress :    Social Connections:     Frequency of Communication with Friends and Family:     Frequency of Social Gatherings with Friends and Family:     Attends Jewish Services:     Active Member of Clubs or Organizations:     Attends Club or Organization Meetings:     Marital Status:    Intimate Partner Violence:     Fear of Current or Ex-Partner:     Emotionally Abused:     Physically Abused:     Sexually Abused: Allergies: Allergies   Allergen Reactions    Iv Dye [Iodides] Hives    Pcn [Penicillins] Rash    Norvasc [Amlodipine] Swelling    Aspirin Other (See Comments)     tingling    Bactrim Other (See Comments)     Yeast infection    Drixoral [Dexbrompheniramine-Pseudoeph] Other (See Comments)     hyper    Morphine Other (See Comments)     Iv line vein reddened       Current Medications:  Current Outpatient Medications   Medication Sig Dispense Refill    Vibegron (GEMTESA) 75 MG TABS Take by mouth daily      atorvastatin (LIPITOR) 20 MG tablet 20 mg daily       NEURONTIN 400 MG capsule Take 1 capsule by mouth 3 times daily for 30 days.  90 capsule 1    losartan-hydroCHLOROthiazide (HYZAAR) 100-12.5 MG per tablet Take 1 tablet by mouth daily 90 tablet 3    metoprolol succinate (TOPROL XL) 50 MG extended release tablet Take 1 tablet by mouth 2 times daily 180 tablet 3    isosorbide mononitrate (IMDUR) 30 MG extended release tablet Take 1 tablet by mouth daily 30 tablet 11    Ca & Phos-Vit D-Mag [phone] TABS Take 1 tablet by mouth 2 times daily       Multiple Vitamins-Minerals (THERAPEUTIC MULTIVITAMIN-MINERALS) tablet Take 1 tablet by mouth daily      calcitonin (MIACALCIN) 200 UNIT/ACT nasal spray 1 spray by Nasal route daily       vitamin D (CHOLECALCIFEROL) 1000 UNIT TABS tablet Take 1,000 Int'l Units by mouth daily Takes two tabs daily      levothyroxine (SYNTHROID) 75 MCG tablet Take 75 mcg by mouth Daily       amitriptyline (ELAVIL) 25 MG tablet Take 25 mg by mouth nightly       mirabegron (MYRBETRIQ) 50 MG TB24 Take 50 mg by mouth daily (Patient not taking: Reported on 8/23/2021)       No current facility-administered medications for this visit. Physical Exam:  /60   Pulse 64   Resp 16   Ht 5' 5\" (1.651 m)   Wt 175 lb 12.8 oz (79.7 kg)   BMI 29.25 kg/m²   Wt Readings from Last 3 Encounters:   08/23/21 175 lb 12.8 oz (79.7 kg)   07/27/21 174 lb 4.8 oz (79.1 kg)   06/23/21 172 lb (78 kg)     Appearance: Awake, alert and oriented x 3, no acute respiratory distress  Skin: Intact, no rash  Head: Normocephalic, atraumatic  Eyes: EOMI, no conjunctival erythema  ENMT: No pharyngeal erythema, MMM, no rhinorrhea  Neck: Supple, no elevated JVP, no carotid bruits  Lungs: Clear to auscultation bilaterally. No wheezes, rales, or rhonchi.   Cardiac: Regular rate and rhythm, +S1S2, no murmurs apparent  Abdomen: Soft, nontender, +bowel sounds  Extremities: Moves all extremities x 4, trace to 1+ lower extremity edema right more than left  Neurologic: No focal motor deficits apparent, normal mood and affect, alert and oriented x 3  Peripheral Pulses: Intact posterior tibial pulses bilaterally    Laboratory Tests:  Lab Results   Component Value Date CREATININE 1.1 (H) 07/27/2021    BUN 24 (H) 07/27/2021     07/27/2021    K 4.4 07/27/2021     07/27/2021    CO2 29 07/27/2021     No results found for: MG  Lab Results   Component Value Date    WBC 10.0 07/27/2021    HGB 12.6 07/27/2021    HCT 38.2 07/27/2021    MCV 91.4 07/27/2021     07/27/2021     Lab Results   Component Value Date    ALT 19 07/27/2021    AST 16 07/27/2021    ALKPHOS 100 07/27/2021    BILITOT 0.4 07/27/2021     Lab Results   Component Value Date    CKTOTAL 83 03/05/2021    CKMB 1.4 02/19/2020    TROPONINI <0.01 02/20/2020    TROPONINI <0.01 02/19/2020    TROPONINI <0.01 02/08/2014     Lab Results   Component Value Date    INR 1.0 08/10/2018    INR 1.0 02/07/2014    PROTIME 11.4 08/10/2018    PROTIME 11.8 02/07/2014     Lab Results   Component Value Date    TSH 0.060 (L) 05/05/2020     Lab Results   Component Value Date    LABA1C 5.9 (H) 03/05/2021     No results found for: EAG  Lab Results   Component Value Date    CHOL 146 05/05/2020    CHOL 213 (H) 02/20/2020    CHOL 162 05/15/2012     Lab Results   Component Value Date    TRIG 108 05/05/2020    TRIG 103 02/20/2020    TRIG 111 05/15/2012     Lab Results   Component Value Date    HDL 63 05/05/2020    HDL 60 02/20/2020    HDL 46.0 05/15/2012     Lab Results   Component Value Date    LDLCALC 61 05/05/2020    LDLCALC 132 (H) 02/20/2020    LDLCALC 94 05/15/2012     Lab Results   Component Value Date    LABVLDL 22 05/05/2020    LABVLDL 21 02/20/2020     No results found for: CHOLHDLRATIO    Cardiac Tests:  ECG: normal sinus rhythm, first-degree AV block,  normal EKG. Echocardiogram: 8/15/2011-LVEF 40%, stage I diastolic dysfunction, mild TR.    TTE-2/20/2020:  Normal left ventricle size and systolic function. Ejection fraction is visually estimated at 60-65%. No regional wall motion abnormalities seen. Normal left ventricle wall thickness. Normal diastolic function. Normal right ventricular size and function.  TAPSE 18 mm.   No hemodynamically significant aortic stenosis is present. No significant valvular abnormalities. RVSP is 20 mmHg. Normal estimated PA systolic pressure. No evidence for hemodynamically significant pericardial effusion. No previous echo for comparison    Stress test:  2/8/2014-no reversible ischemia, no defect or infarction. LVEF 77%. Lexiscan nuclear stress test-2/20/2020: LVEF 89%, no reversible perfusion defect, normal wall motion. The ASCVD Risk score (Lyndsey Mason., et al., 2013) failed to calculate for the following reasons: The 2013 ASCVD risk score is only valid for ages 36 to 78    Lipid panel done on 4/30/2019: Total cholesterol 164, LDL 88, HDL 61, triglycerides 74, renal functions were normal with a creatinine of 0.8 and CBC was normal.    ASSESSMENT:  1. Palpitations most likely secondary to premature atrial complexes well controlled on Toprol  2. Atypical chest pain, rule out ischemia. 3. Hyperlipidemia stable and on statins, LDL is well controlled at goal level. 4. Hypertension, well controlled. 5. History of TIA  6. Hypothyroidism on hormone replacement therapy  7. Peripheral edema right more than left rule out DVT. 8. Allergic aspirin. Plan:   1. Continue metoprolol 50 mg twice a day, Imdur 30 mg po daily  2. Continue  losartan hydrochlorothiazide to losartan/HCTZ 100/12.5 mg 1 p.o. daily for hypertension. 3. Continue atorvastatin 20 mg p.o. daily. 4. Continue regular exercise   5. She was advised to continue using compression stockings  6. Echo results were reviewed with the patient and showed normal LV function without any significant valvular heart disease. 7. We will schedule for another Lexiscan nuclear stress test to rule out ischemia due to recurrent episodes of chest pain. 8. She was advised to get lipid panel from primary doctor. 9. Follow up with me in 6 months.     The patient's current medication list, allergies, problem list and results of all previously ordered testing were reviewed at today's visit.   Kian Hill MD  [facility] ([facility]) Cardiology

## 2021-08-23 NOTE — PATIENT INSTRUCTIONS
1. Continue metoprolol 50 mg twice a day, Imdur 30 mg po daily  2. Continue  losartan hydrochlorothiazide to losartan/HCTZ 100/12.5 mg 1 p.o. daily for hypertension. 3. Continue atorvastatin 20 mg p.o. daily. 4. Continue regular exercise   5. She was advised to continue using compression stockings  6. We will schedule for another Lexiscan nuclear stress test to rule out ischemia due to recurrent episodes of chest pain. 7. She was advised to get lipid panel from primary doctor. 8. Follow up with me in 6 months.

## 2021-08-24 ENCOUNTER — TELEPHONE (OUTPATIENT)
Dept: CARDIOLOGY | Age: 86
End: 2021-08-24

## 2021-08-24 ENCOUNTER — OFFICE VISIT (OUTPATIENT)
Dept: PAIN MANAGEMENT | Age: 86
End: 2021-08-24
Payer: MEDICARE

## 2021-08-24 VITALS
SYSTOLIC BLOOD PRESSURE: 120 MMHG | OXYGEN SATURATION: 97 % | WEIGHT: 175 LBS | HEART RATE: 65 BPM | DIASTOLIC BLOOD PRESSURE: 52 MMHG | TEMPERATURE: 97.5 F | HEIGHT: 65 IN | BODY MASS INDEX: 29.16 KG/M2 | RESPIRATION RATE: 18 BRPM

## 2021-08-24 DIAGNOSIS — G89.4 CHRONIC PAIN SYNDROME: ICD-10-CM

## 2021-08-24 DIAGNOSIS — M47.816 LUMBAR SPONDYLOSIS: ICD-10-CM

## 2021-08-24 DIAGNOSIS — M48.061 SPINAL STENOSIS OF LUMBAR REGION WITHOUT NEUROGENIC CLAUDICATION: ICD-10-CM

## 2021-08-24 DIAGNOSIS — M51.9 LUMBAR DISC DISORDER: ICD-10-CM

## 2021-08-24 DIAGNOSIS — M79.2 NEURALGIA AND NEURITIS: ICD-10-CM

## 2021-08-24 DIAGNOSIS — M47.816 LUMBAR FACET ARTHROPATHY: Primary | ICD-10-CM

## 2021-08-24 PROCEDURE — 99213 OFFICE O/P EST LOW 20 MIN: CPT | Performed by: PAIN MEDICINE

## 2021-08-24 RX ORDER — GABAPENTIN 400 MG/1
400 CAPSULE ORAL 3 TIMES DAILY
Qty: 90 CAPSULE | Refills: 3 | Status: SHIPPED
Start: 2021-08-24 | End: 2021-12-17 | Stop reason: SDUPTHER

## 2021-08-24 NOTE — PROGRESS NOTES
Do you currently have any of the following:    Fever: No  Headache:  No  Cough: No  Shortness of breath: No  Exposed to anyone with these symptoms: No                                                                                                                Cristina Marx presents to the [address] on 8/24/2021. Pauline Clark is complaining of pain 2  . The pain is constant. The pain is described as tingling. Pain is rated on her best day at a 3, on her worst day at a 5, and on average at a 3 on the VAS scale. She took her last dose of Neurontin this am around 9. Pauline Clark does have issues with constipation. Any procedures since your last visit: No    She is not on NSAIDS and  is not on anticoagulation medications to include none and is managed by NA. Pacemaker or defibrillator: No Physician managing device is NA. Medication Contract and Consent for Opioid Use Documents Filed      No documents found                   BP (!) 120/52   Pulse 65   Temp 97.5 °F (36.4 °C)   Resp 18   Ht 5' 5\" (1.651 m)   Wt 175 lb (79.4 kg)   SpO2 97%   BMI 29.12 kg/m²      No LMP recorded.  Patient is postmenopausal.

## 2021-08-24 NOTE — PROGRESS NOTES
[address]  [address] [facility], [address] [place] [address]  [phone]    Follow up Note      Murali Caldera     Date of Visit:  8/24/2021    CC:  Patient presents for follow up   Chief Complaint   Patient presents with    Leg Pain     bilateral extremities     HPI:    Pain is tolerable  Appropriate analgesia with current medications regimen: yes - . Change in quality of symptoms:none  Medication side effects:none. Recent diagnostic testing:none. Recent interventional procedures:none    She has not been on anticoagulation medications to include none. The patient  has not been on herbal supplements. The patient is not diabetic.     Imaging:   Lumbar spine MRI   Multilevel lumbar spondylosis most pronounced at L3-4, there is diffuse disc bulge with facet hypertrophy resulting in moderate central canal and bilateral foraminal narrowing. Mild right foraminal narrowing at L2-3 and mild bilateral foraminal narrowing at L4-5 and L5-S1.     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     The study also reveals evidence of chronic neurogenic changes in the right S1 myotome indicative of a right chronic S1 radiculopathy.     Previous treatments: Physical Therapy and medications. .         Potential Aberrant Drug-Related Behavior:  No      Urine Drug Screening:  None    OARRS report:  08/2021 consistent    Opioid Agreement:  Date enacted:N/A  Renewal date:N/A    Past Medical History:   Diagnosis Date    Abdominal pain     Arthritis     l knee injections with dr Mary Lehman    Chest pain     resolved    Difficult intubation 1-    document on chart    Hypertension     Hypothyroidism     Low back pain     Migraine headache     Neuropathy sensory in feet     Osteopenia     Palpitation     resolved    Plantar fasciitis     Skipped heart beats     Tingling of both feet     Tinnitus     hissing bilateral     Past Surgical History:   Procedure Laterality Date    BLADDER SURGERY      mesh    BREAST RECONSTRUCTION Bilateral 2004, 2007    COLONOSCOPY  10/13/2020    DILATION AND CURETTAGE OF UTERUS      EYE SURGERY Bilateral     cataract removal    INCONTINENCE SURGERY      MASTECTOMY Bilateral 1982    for fibrocystic breast disease; has saline implants    PAIN MANAGEMENT PROCEDURE Right 4/12/2021    LUMBAR EPIDURAL STEROID INJECTION L5-S1 RIGHT PARAMEDIAN WITH 80 DEPO **ALLERGIC TO IV DYE** performed by Lexus Boyd MD at [facility] ENDOVENOUS RF, 1ST VEIN Right 8/10/2018    RIGHT GREASTER SAPHENOUS VEIN STAB PHLEBECTOMIES performed by Arlington Angelucci, MD at [address] [place] [address]       Prior to Admission medications    Medication Sig Start Date End Date Taking? Authorizing Provider   Vibegron (GEMTESA) 75 MG TABS Take by mouth daily    Historical Provider, MD   atorvastatin (LIPITOR) 20 MG tablet 20 mg daily  6/21/21   Historical Provider, MD   NEURONTIN 400 MG capsule Take 1 capsule by mouth 3 times daily for 30 days.  6/23/21 8/23/21  Lexus Boyd MD   losartan-hydroCHLOROthiazide [facility]) 100-12.5 MG per tablet Take 1 tablet by mouth daily 5/5/21   Sandee Moyer MD   metoprolol succinate (TOPROL XL) 50 MG extended release tablet Take 1 tablet by mouth 2 times daily 5/5/21   Sandee Moyer MD   isosorbide mononitrate (IMDUR) 30 MG extended release tablet Take 1 tablet by mouth daily 4/26/21   Sandee Moyer MD   Ca & Phos-Vit D-Mag [phone] TABS Take 1 tablet by mouth 2 times daily     Historical Provider, MD   Multiple Vitamins-Minerals (THERAPEUTIC MULTIVITAMIN-MINERALS) tablet Take 1 tablet by mouth daily    Historical Provider, MD   calcitonin (MIACALCIN) 200 UNIT/ACT nasal spray 1 spray by Nasal route daily  4/6/16   Historical Provider, MD   vitamin D (CHOLECALCIFEROL) 1000 UNIT TABS tablet Take 1,000 Int'l Units by mouth daily Takes two tabs daily    Historical Provider, MD   levothyroxine (SYNTHROID) 75 MCG tablet Take 75 mcg by mouth Daily     Historical Provider, MD   amitriptyline (ELAVIL) 25 MG tablet Take 25 mg by mouth nightly     Historical Provider, MD     Allergies   Allergen Reactions    Iv Dye [Iodides] Hives    Pcn [Penicillins] Rash    Norvasc [Amlodipine] Swelling    Aspirin Other (See Comments)     tingling    Bactrim Other (See Comments)     Yeast infection    Drixoral [Dexbrompheniramine-Pseudoeph] Other (See Comments)     hyper    Morphine Other (See Comments)     Iv line vein reddened     Social History     Socioeconomic History    Marital status:      Spouse name: Not on file    Number of children: Not on file    Years of education: Not on file    Highest education level: Not on file   Occupational History    Not on file   Tobacco Use    Smoking status: Never Smoker    Smokeless tobacco: Never Used   Vaping Use    Vaping Use: Never used   Substance and Sexual Activity    Alcohol use: No    Drug use: No    Sexual activity: Not Currently     Partners: Male   Other Topics Concern    Not on file   Social History Narrative    Not on file     Social Determinants of Health     Financial Resource Strain:     Difficulty of Paying Living Expenses:    Food Insecurity:     Worried About Running Out of Food in the Last Year:     Ran Out of Food in the Last Year:    Transportation Needs:     Lack of Transportation (Medical):      Lack of Transportation (Non-Medical):    Physical Activity:     Days of Exercise per Week:     Minutes of Exercise per Session:    Stress:     Feeling of Stress :    Social Connections:     Frequency of Communication with Friends and Family:     Frequency of Social Gatherings with Friends and Family:     Attends Zoroastrian Services:     Active Member of Clubs or Organizations:     Attends Club or Organization Meetings:     Marital Status:    Intimate Partner Violence:     Fear of Current or Ex-Partner:     Emotionally Abused:     Physically Abused:     Sexually Abused:      Family History   Problem Relation Age of Onset    Kidney Disease Mother     Heart Disease Father     Coronary Art Dis Father     Coronary Art Dis Maternal Grandfather     Cancer Paternal Uncle         unknown     REVIEW OF SYSTEMS:     Vernon Baeza denies fever/chills, chest pain, shortness of breath, new bowel or bladder complaints. All other review of systems was negative. PHYSICAL EXAMINATION:      BP (!) 120/52   Pulse 65   Temp 97.5 °F (36.4 °C)   Resp 18   Ht 5' 5\" (1.651 m)   Wt 175 lb (79.4 kg)   SpO2 97%   BMI 29.12 kg/m²     General:       General appearance:   elderly, pleasant and well-hydrated. , in mild discomfort and A & O x3  Build:Overweight     HEENT:     Head:normocephalic and atraumatic  Sclera: icterus absent,      Lungs:     Breathing:Breathing Pattern: regular, no distress     Abdomen:     Shape:non-distended and normal  Tenderness:none     Lumbar spine:     Spine inspection:scoliosis   CVA tenderness:No   Palpation:tenderness paravertebral muscles, facet loading, left, right, positive and tenderness. Range of motion:abnormal moderately Lateral bending, flexion, extension rotation bilateral and is  painful.     Musculoskeletal:     Trigger points in Paraveteral:absent bilaterally  SI joint tenderness:negative right, negative left  Trochanteric bursa tenderness:positive right, positive left  SLR:negative right, negative left, sitting      Extremities:     Tremors:None bilaterally upper and lower  Range of motion: pain with internal rotation of hips negative.   Intact:Yes  Edema:+1 pitting bilaterally     Neurological:     Sensory:normal to light touch bilateral lower extremities  Motor:                       Right Quadriceps5/5 Left Quadriceps5/5           Right Gastrocnemius5/5    Left Gastrocnemius5/5  Right Ant Tibialis5/5  Left Ant Tibialis5/5  Reflexes:    Right Quadriceps reflex2+  Left Quadriceps reflex2+  Right Achilles reflex2+  Left Achilles reflex2+  Gait:normal     Dermatology:     Skin:no unusual rashes and no skin lesions    Exam unchanged       Impression:  Bilateral leg pain. Failed LESI. Plan:  Follow up on her bilateral leg pain with no acute issues. Continue with Gabapentin  400 mg TID, per patient her pain is well managed with current medications regimen. OARRS report reviewed 08/2021. Patient encouraged to stay active and to lose weight. Treatment plan discussed with the patient including medications side effects. We discussed with the patient that combining opioids, benzodiazepines, alcohol, illicit drugs or sleep aids increases the risk of respiratory depression including death. We discussed that these medications may cause drowsiness, sedation or dizziness and have counseled the patient not to drive or operate machinery. We have discussed that these medications will be prescribed only by one provider. We have discussed with the patient about age related risk factors and have thoroughly discussed the importance of taking these medications as prescribed. The patient verbalizes understanding. jacqui Delatorre M.D.

## 2021-08-27 ENCOUNTER — TELEPHONE (OUTPATIENT)
Dept: CARDIOLOGY | Age: 86
End: 2021-08-27

## 2021-08-27 NOTE — TELEPHONE ENCOUNTER
8-27-21 @ 1340. Called and spoke with the patient with a reminder regarding her stress test on 8-31-21 @ 0900. Reviewed instructions including to take her morning medications as she normally would and reviewed Covid checklist. Patient voiced understanding to all instructions.   Leroyjalen Aguilar RN  [facility] V[facility]

## 2021-08-31 ENCOUNTER — HOSPITAL ENCOUNTER (OUTPATIENT)
Dept: CARDIOLOGY | Age: 86
Discharge: HOME | End: 2021-08-31
Payer: MEDICARE

## 2021-08-31 ENCOUNTER — TELEPHONE (OUTPATIENT)
Dept: CARDIOLOGY CLINIC | Age: 86
End: 2021-08-31

## 2021-08-31 VITALS
BODY MASS INDEX: 29.16 KG/M2 | DIASTOLIC BLOOD PRESSURE: 68 MMHG | WEIGHT: 175 LBS | HEART RATE: 51 BPM | HEIGHT: 65 IN | SYSTOLIC BLOOD PRESSURE: 136 MMHG

## 2021-08-31 DIAGNOSIS — R07.89 CHEST PAIN, ATYPICAL: ICD-10-CM

## 2021-08-31 PROCEDURE — A9500 TC99M SESTAMIBI: HCPCS | Performed by: INTERNAL MEDICINE

## 2021-08-31 PROCEDURE — 2580000003 HC RX 258: Performed by: INTERNAL MEDICINE

## 2021-08-31 PROCEDURE — 6360000002 HC RX W HCPCS: Performed by: INTERNAL MEDICINE

## 2021-08-31 PROCEDURE — 78452 HT MUSCLE IMAGE SPECT MULT: CPT

## 2021-08-31 PROCEDURE — 93017 CV STRESS TEST TRACING ONLY: CPT

## 2021-08-31 PROCEDURE — 3430000000 HC RX DIAGNOSTIC RADIOPHARMACEUTICAL: Performed by: INTERNAL MEDICINE

## 2021-08-31 RX ORDER — SODIUM CHLORIDE 0.9 % (FLUSH) 0.9 %
10 SYRINGE (ML) INJECTION PRN
Status: DISCONTINUED | OUTPATIENT
Start: 2021-08-31 | End: 2021-09-01 | Stop reason: HOSPADM

## 2021-08-31 RX ADMIN — Medication 10.9 MILLICURIE: at 09:12

## 2021-08-31 RX ADMIN — REGADENOSON 0.4 MG: 0.08 INJECTION, SOLUTION INTRAVENOUS at 11:53

## 2021-08-31 RX ADMIN — SODIUM CHLORIDE, PRESERVATIVE FREE 10 ML: 5 INJECTION INTRAVENOUS at 09:11

## 2021-08-31 RX ADMIN — Medication 28.2 MILLICURIE: at 11:53

## 2021-08-31 RX ADMIN — SODIUM CHLORIDE, PRESERVATIVE FREE 10 ML: 5 INJECTION INTRAVENOUS at 11:53

## 2021-08-31 RX ADMIN — SODIUM CHLORIDE, PRESERVATIVE FREE 10 ML: 5 INJECTION INTRAVENOUS at 11:54

## 2021-08-31 NOTE — PROCEDURES
[address] and Vascular [address]  [phone]                Pharmacologic Stress Nuclear Gated SPECT Study    Name: Mariah Britton [facility] Account Number: [de-identified]    :  1935          Sex: female         Date of Study:  2021    Height: 5' 5\" (165.1 cm)         Weight: 175 lb (79.4 kg)     Ordering Provider: Karlos Sharp MD          PCP: Meaghan Stinson MD      Cardiologist: Karlos Sharp MD             Interpreting Physician: Shereen Samuels MD  _________________________________________________________________________________    Indication:   Detecting the presence and location of coronary artery disease    Clinical History:   Patient has no known history of coronary artery disease. Resting ECG:    Sinus bradycardia    Procedure:   Pharmacologic stress testing was performed with regadenoson 0.4 mg for 15 seconds. The heart rate was 51 at baseline and adolfo to 82 beats during the infusion. This corresponds to 60% of maximum predicted heart rate. The blood pressure at baseline was 136/68 and blood pressure at the end of infusion was 140/70. Blood pressure response was normal during the stress procedure. The patient experienced mild gastric discomfort, sensation around mouth including legs felt different. Symptoms resolved post sips of Diet Port Tobacco. ECG during the infusion did not change. IMAGING: Myocardial perfusion imaging was performed at rest 30-35 minutes following the intravenous injection of 10.9 mCi of (Tc-Sestamibi) followed by 10 ml of Normal Saline. As per infusion protocol, the patient was injected intravenously with 28.2 mCi of (Tc-Sestamibi) followed by 10 ml of Normal Saline. Gated post-stress tomographic imaging was performed 20-25 minutes after stress. FINDINGS: The overall quality of the study was good.      Left ventricular cavity size was noted to be normal.    Rotational analog analysis demonstrated soft tissue diaphragmatic attenuation. The gated SPECT stress imaging in the short, vertical long, and horizontal long axis demonstrated normal homogeneous tracer distribution throughout the myocardium. The resting images are normal.     Gated SPECT left ventricular ejection fraction was calculated to be 85%, with normal myocardial thickening and wall motion. Impression:    1. ECG during the infusion did not change. 2. The myocardial perfusion imaging was normal.    3. Overall left ventricular systolic function was normal without regional wall motion abnormalities. 4. Low risk general pharmacologic stress test.    Thank you for sending your patient to this [employer].      Electronically signed by Collette Blake DO on 8/31/21 at 1:32 PM EDT

## 2021-10-19 DIAGNOSIS — D47.2 MONOCLONAL GAMMOPATHY: ICD-10-CM

## 2021-10-19 LAB
ALBUMIN SERPL-MCNC: 4 G/DL (ref 3.5–5.2)
ALP BLD-CCNC: 98 U/L (ref 35–104)
ALT SERPL-CCNC: 18 U/L (ref 0–32)
ANION GAP SERPL CALCULATED.3IONS-SCNC: 11 MMOL/L (ref 7–16)
AST SERPL-CCNC: 22 U/L (ref 0–31)
BASOPHILS ABSOLUTE: 0.04 E9/L (ref 0–0.2)
BASOPHILS RELATIVE PERCENT: 0.5 % (ref 0–2)
BILIRUB SERPL-MCNC: 0.7 MG/DL (ref 0–1.2)
BUN BLDV-MCNC: 17 MG/DL (ref 6–23)
CALCIUM SERPL-MCNC: 9.9 MG/DL (ref 8.6–10.2)
CHLORIDE BLD-SCNC: 103 MMOL/L (ref 98–107)
CO2: 28 MMOL/L (ref 22–29)
CREAT SERPL-MCNC: 1 MG/DL (ref 0.5–1)
EOSINOPHILS ABSOLUTE: 0.22 E9/L (ref 0.05–0.5)
EOSINOPHILS RELATIVE PERCENT: 2.8 % (ref 0–6)
GFR AFRICAN AMERICAN: >60
GFR NON-AFRICAN AMERICAN: 53 ML/MIN/1.73
GLUCOSE BLD-MCNC: 95 MG/DL (ref 74–99)
HCT VFR BLD CALC: 39.7 % (ref 34–48)
HEMOGLOBIN: 13 G/DL (ref 11.5–15.5)
IMMATURE GRANULOCYTES #: 0.01 E9/L
IMMATURE GRANULOCYTES %: 0.1 % (ref 0–5)
LACTATE DEHYDROGENASE: 156 U/L (ref 135–214)
LYMPHOCYTES ABSOLUTE: 2.64 E9/L (ref 1.5–4)
LYMPHOCYTES RELATIVE PERCENT: 33.8 % (ref 20–42)
MCH RBC QN AUTO: 30.2 PG (ref 26–35)
MCHC RBC AUTO-ENTMCNC: 32.7 % (ref 32–34.5)
MCV RBC AUTO: 92.1 FL (ref 80–99.9)
MONOCYTES ABSOLUTE: 0.61 E9/L (ref 0.1–0.95)
MONOCYTES RELATIVE PERCENT: 7.8 % (ref 2–12)
NEUTROPHILS ABSOLUTE: 4.3 E9/L (ref 1.8–7.3)
NEUTROPHILS RELATIVE PERCENT: 55 % (ref 43–80)
PDW BLD-RTO: 13.8 FL (ref 11.5–15)
PLATELET # BLD: 233 E9/L (ref 130–450)
PMV BLD AUTO: 10.5 FL (ref 7–12)
POTASSIUM SERPL-SCNC: 4.3 MMOL/L (ref 3.5–5)
RBC # BLD: 4.31 E12/L (ref 3.5–5.5)
SODIUM BLD-SCNC: 142 MMOL/L (ref 132–146)
WBC # BLD: 7.8 E9/L (ref 4.5–11.5)

## 2021-10-20 LAB
ALBUMIN SERPL-MCNC: 2.9 G/DL (ref 3.5–4.7)
ALPHA-1-GLOBULIN: 0.2 G/DL (ref 0.2–0.4)
ALPHA-2-GLOBULIN: 0.9 G/FL (ref 0.5–1)
BETA GLOBULIN: 0.8 G/DL (ref 0.8–1.3)
ELECTROPHORESIS: ABNORMAL
GAMMA GLOBULIN: 1 G/DL (ref 0.7–1.6)
IGA: 112 MG/DL (ref 70–400)
IGG: 822 MG/DL (ref 700–1600)
IGM: 105 MG/DL (ref 40–230)
IMMUNOFIXATION RESULT, SERUM: NORMAL
TOTAL PROTEIN: 6.8 G/DL (ref 6.4–8.3)

## 2021-10-21 LAB — BETA-2 MICROGLOBULIN: 2.8 MG/L (ref 0.6–2.4)

## 2021-10-22 LAB
KAPPA FREE LIGHT CHAINS QNT: 31.6 MG/L (ref 3.3–19.4)
KAPPA/LAMBDA FREE LIGHT CHAIN RATIO: 2.04 (ref 0.26–1.65)
LAMBDA FREE LIGHT CHAINS QNT: 15.46 MG/L (ref 5.71–26.3)

## 2021-10-26 ENCOUNTER — OFFICE VISIT (OUTPATIENT)
Dept: ONCOLOGY | Age: 86
End: 2021-10-26
Payer: MEDICARE

## 2021-10-26 ENCOUNTER — HOSPITAL ENCOUNTER (OUTPATIENT)
Dept: INFUSION THERAPY | Age: 86
Discharge: HOME | End: 2021-10-26
Payer: MEDICARE

## 2021-10-26 VITALS
TEMPERATURE: 97.2 F | OXYGEN SATURATION: 98 % | DIASTOLIC BLOOD PRESSURE: 60 MMHG | BODY MASS INDEX: 28.99 KG/M2 | WEIGHT: 174 LBS | SYSTOLIC BLOOD PRESSURE: 133 MMHG | HEIGHT: 65 IN | HEART RATE: 61 BPM

## 2021-10-26 DIAGNOSIS — D47.2 MONOCLONAL GAMMOPATHY: Primary | ICD-10-CM

## 2021-10-26 PROCEDURE — 99212 OFFICE O/P EST SF 10 MIN: CPT

## 2021-10-26 PROCEDURE — 99214 OFFICE O/P EST MOD 30 MIN: CPT | Performed by: INTERNAL MEDICINE

## 2021-10-26 NOTE — PROGRESS NOTES
[address] MED ONCOLOGY  [address] [facility] [address]  Dept: [phone]  Attending Progress Note      Reason for Visit:   Monoclonal gammopathy. Referring Physician:  Arianna Diaz MD    PCP:  Tena Perkins MD    History of Present Illness: The patient is a very pleasant 68-year-old lady, with a past medical history significant for hypertension, hypothyroidism, migraine headache, she is a retired RN, she was seen by neurology for peripheral neuropathy, she had a work-up done, including SPEP with BERE, revealing minor/faint band/minute amount of IgG kappa monoclonal protein, M spike 0.4G/DL. The patient has pain from peripheral neuropathy, overall stable. Also pain in the lateral aspect of the right thigh. Sometimes she has back pain when she bends over. No history of anemia, kidney disease or hypercalcemia. Review of Systems;  CONSTITUTIONAL: No fever, chills. Fair appetite and energy level. ENMT: Eyes: No diplopia; Nose: No epistaxis. Mouth: No sore throat. RESPIRATORY: No hemoptysis, shortness of breath, cough. CARDIOVASCULAR: No chest pain, palpitations. GASTROINTESTINAL: No nausea/vomiting, abdominal pain, diarrhea/constipation. GENITOURINARY: No dysuria, urinary frequency, hematuria. NEURO: No syncope, presyncope, pos for migraine headache. Peripheral neuropathy.   Remainder:  ROS NEGATIVE    Past Medical History:      Diagnosis Date    Abdominal pain     Arthritis     l knee injections with dr Francy Carter    Chest pain     resolved    Difficult intubation 1-    document on chart    Hypertension     Hypothyroidism     Low back pain     Migraine headache     Neuropathy     sensory in feet     Osteopenia     Palpitation     resolved    Plantar fasciitis     Skipped heart beats     Tingling of both feet     Tinnitus     hissing bilateral     Patient Active Problem List   Diagnosis    Chest pain    Hyperlipidemia    Migraine headache    Plantar fasciitis    Osteopenia    Varicose veins of both lower extremities    Venous insufficiency of both lower extremities    Hypothyroidism    Left arm pain    Neuralgia and neuritis    Carpal tunnel syndrome of right wrist    Lumbar radiculopathy    Type 2 diabetes mellitus without complication, without long-term current use of insulin (HCC)    Numbness and tingling    Chronic pain syndrome    Lumbar facet arthropathy    Lumbar disc disorder    Spinal stenosis of lumbar region without neurogenic claudication        Past Surgical History:      Procedure Laterality Date    BLADDER SURGERY      mesh    BREAST RECONSTRUCTION Bilateral 2004, 2007    COLONOSCOPY  10/13/2020    DILATION AND CURETTAGE OF UTERUS      EYE SURGERY Bilateral     cataract removal    INCONTINENCE SURGERY      MASTECTOMY Bilateral 1982    for fibrocystic breast disease; has saline implants Ok to take B/P either arm    PAIN MANAGEMENT PROCEDURE Right 04/12/2021    LUMBAR EPIDURAL STEROID INJECTION L5-S1 RIGHT PARAMEDIAN WITH 80 DEPO **ALLERGIC TO IV DYE** performed by Enrique Cano MD at [address] [place] [address] ENDOVENOUS RF, 1ST VEIN Right 08/10/2018    RIGHT GREASTER SAPHENOUS VEIN STAB PHLEBECTOMIES performed by Dottie Cortez MD at [facility] TONSILLECTOMY         Family History:  Family History   Problem Relation Age of Onset    Kidney Disease Mother     Atrial Fibrillation Father     Coronary Art Dis Maternal Grandfather     Cancer Paternal Uncle         unknown       Medications:  Reviewed and reconciled.     Social History:  Social History     Socioeconomic History    Marital status:      Spouse name: Not on file    Number of children: Not on file    Years of education: Not on file    Highest education level: Not on file   Occupational History    Not on file   Tobacco Use    Smoking status: Never Smoker    Smokeless tobacco: Never Used   Vaping Use    Vaping Use: Never used   Substance and Sexual Activity    Alcohol use: No    Drug use: No    Sexual activity: Not Currently     Partners: Male   Other Topics Concern    Not on file   Social History Narrative    Not on file     Social Determinants of Health     Financial Resource Strain:     Difficulty of Paying Living Expenses:    Food Insecurity:     Worried About Running Out of Food in the Last Year:     [address] in the Last Year:    Transportation Needs:     Lack of Transportation (Medical):  Lack of Transportation (Non-Medical):    Physical Activity:     Days of Exercise per Week:     Minutes of Exercise per Session:    Stress:     Feeling of Stress :    Social Connections:     Frequency of Communication with Friends and Family:     Frequency of Social Gatherings with Friends and Family:     Attends Adventist Services:     Active Member of Clubs or Organizations:     Attends Club or Organization Meetings:     Marital Status:    Intimate Partner Violence:     Fear of Current or Ex-Partner:     Emotionally Abused:     Physically Abused:     Sexually Abused: Allergies: Allergies   Allergen Reactions    Iv Dye [Iodides] Hives    Pcn [Penicillins] Rash    Norvasc [Amlodipine] Swelling    Aspirin Other (See Comments)     tingling    Bactrim Other (See Comments)     Yeast infection    Drixoral [Dexbrompheniramine-Pseudoeph] Other (See Comments)     hyper    Morphine Other (See Comments)     Iv line vein reddened       Physical Exam:  /60   Pulse 61   Temp 97.2 °F (36.2 °C)   Ht 5' 5\" (1.651 m)   Wt 174 lb (78.9 kg)   SpO2 98%   BMI 28.96 kg/m²   GENERAL: Alert, oriented x 3, not in acute distress. HEENT: PERRLA; EOMI. Oropharynx clear. NECK: Supple. No palpable cervical or supraclavicular lymphadenopathy. LUNGS: Good air entry bilaterally. No wheezing, crackles or rhonchi. CARDIOVASCULAR: Regular rate. No murmurs, rubs or gallops. ABDOMEN: Soft.  Non-tender, non-distended. Positive bowel sounds. EXTREMITIES: Without clubbing, cyanosis, or edema. NEUROLOGIC: No focal deficits. ECOG PS 1    Impression/Plan:        The patient is a very pleasant 82-year-old lady, with a past medical history significant for hypertension, hypothyroidism, migraine headache, she is a retired RN, she was seen by neurology for peripheral neuropathy, she had a work-up done, including SPEP with BERE, revealing minor/faint band/minute amount of IgG kappa monoclonal protein, M spike 0.4G/DL. IgA 118, IgG 779, IgM 106. Patient with IgG kappa monoclonal gammopathy, the patient does not have anemia, hypercalcemia or kidney dysfunction, a work-up was ordered, the M spike is small, 0.3G/DL, IgA 131, IgG 907, IgM 114. Kappa one 8.76, lambda one 2.34, kappa to lambda ratio 1.52, beta-2 microglobulin is 2.2, a skeletal survey was done on 3/30/2021, revealing osteopenia, no focal lytic lesions, small sclerotic lesion in the right iliac bone, likely a bone island. The results were reviewed with the patient. 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 On 10/19/2021, she had repeat SPEP with immunofixation, she has an IgG kappa monoclonal protein, M spike is stable at 0.5G/DL, kappa is slightly elevated, with a normal kappa to lambda ratio, quantitative immunoglobin's are all within normal range, creatinine is normal, she does not have hypercalcemia or anemia. Recommended close follow-up, we will follow her closely, she will have SPEP, immunofixation, serum light chain assay, quantitative immunoglobulins and beta-2 microglobulin done in 3 months. She will have a skeletal survey done yearly. RTC in 3 months, with blood work 1 week prior. Thank you for allowing us to participate in the care of Mrs. Arsen Chase.     Nadira Hernandes MD   HEMATOLOGY/MEDICAL ONCOLOGY  [facility] [address] [facility] MED ONCOLOGY  [address] [place] [id number]  Dept: [phone]

## 2021-11-18 NOTE — TELEPHONE ENCOUNTER
lexiscan scheduled.   Electronically signed by Emi Moreno on 8/24/2021 at 10:42 AM limiting exercise due to knee pain

## 2021-12-17 ENCOUNTER — OFFICE VISIT (OUTPATIENT)
Dept: PAIN MANAGEMENT | Age: 86
End: 2021-12-17
Payer: MEDICARE

## 2021-12-17 VITALS
TEMPERATURE: 96.6 F | HEART RATE: 78 BPM | BODY MASS INDEX: 27.8 KG/M2 | RESPIRATION RATE: 16 BRPM | HEIGHT: 66 IN | OXYGEN SATURATION: 98 % | DIASTOLIC BLOOD PRESSURE: 64 MMHG | SYSTOLIC BLOOD PRESSURE: 122 MMHG | WEIGHT: 173 LBS

## 2021-12-17 DIAGNOSIS — G89.4 CHRONIC PAIN SYNDROME: ICD-10-CM

## 2021-12-17 DIAGNOSIS — M48.061 SPINAL STENOSIS OF LUMBAR REGION WITHOUT NEUROGENIC CLAUDICATION: ICD-10-CM

## 2021-12-17 DIAGNOSIS — M47.816 LUMBAR SPONDYLOSIS: ICD-10-CM

## 2021-12-17 DIAGNOSIS — M79.2 NEURALGIA AND NEURITIS: ICD-10-CM

## 2021-12-17 DIAGNOSIS — M47.816 LUMBAR FACET ARTHROPATHY: ICD-10-CM

## 2021-12-17 DIAGNOSIS — M51.9 LUMBAR DISC DISORDER: Primary | ICD-10-CM

## 2021-12-17 PROCEDURE — 99213 OFFICE O/P EST LOW 20 MIN: CPT | Performed by: PAIN MEDICINE

## 2021-12-17 RX ORDER — GABAPENTIN 400 MG/1
400 CAPSULE ORAL 3 TIMES DAILY
Qty: 90 CAPSULE | Refills: 3 | Status: SHIPPED
Start: 2021-12-17 | End: 2021-12-27 | Stop reason: SDUPTHER

## 2021-12-17 NOTE — PROGRESS NOTES
[address]  [address] [facility], [address] [place] [address] [place]  [phone]    Follow up Note      Sanjiv Ramey     Date of Visit:  12/17/2021    CC:  Patient presents for follow up   Chief Complaint   Patient presents with    Follow-up    Other     leg pain and lower back     HPI:    Pain is tolerable  Appropriate analgesia with current medications regimen: yes - . Change in quality of symptoms:none  Medication side effects:none. Recent diagnostic testing:none. Recent interventional procedures:none    She has not been on anticoagulation medications to include none. The patient  has not been on herbal supplements. The patient is not diabetic.     Imaging:   Lumbar spine MRI   Multilevel lumbar spondylosis most pronounced at L3-4, there is diffuse disc bulge with facet hypertrophy resulting in moderate central canal and bilateral foraminal narrowing. Mild right foraminal narrowing at L2-3 and mild bilateral foraminal narrowing at L4-5 and L5-S1.     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     The study also reveals evidence of chronic neurogenic changes in the right S1 myotome indicative of a right chronic S1 radiculopathy.     Previous treatments: Physical Therapy and medications. .         Potential Aberrant Drug-Related Behavior:  No      Urine Drug Screening:  None    OARRS report:  12/2021 consistent    Opioid Agreement:  Date enacted:N/A  Renewal date:N/A    Past Medical History:   Diagnosis Date    Abdominal pain     Arthritis     l knee injections with dr Phu Snyder    Chest pain     resolved    Difficult intubation 1-    document on chart    Hypertension     Hypothyroidism     Low back pain     Migraine headache     Neuropathy     sensory in feet     Osteopenia     Palpitation     resolved    Plantar fasciitis     Skipped heart beats     Tingling of both feet     Tinnitus     hissing bilateral     Past Surgical History:   Procedure Laterality Date    BLADDER SURGERY      mesh    BREAST RECONSTRUCTION Bilateral 2004, 2007    COLONOSCOPY  10/13/2020    DILATION AND CURETTAGE OF UTERUS      EYE SURGERY Bilateral     cataract removal    INCONTINENCE SURGERY      MASTECTOMY Bilateral 1982    for fibrocystic breast disease; has saline implants Ok to take B/P either arm    PAIN MANAGEMENT PROCEDURE Right 04/12/2021    LUMBAR EPIDURAL STEROID INJECTION L5-S1 RIGHT PARAMEDIAN WITH 80 DEPO **ALLERGIC TO IV DYE** performed by Aleksandar Luo MD at [address] [facility] ENDOVENOUS RF, 1ST VEIN Right 08/10/2018    RIGHT GREASTER SAPHENOUS VEIN STAB PHLEBECTOMIES performed by Frederic Eldridge MD at [facility]       Prior to Admission medications    Medication Sig Start Date End Date Taking?  Authorizing Provider   Vibegron (GEMTESA) 75 MG TABS Take by mouth daily   Yes Historical Provider, MD   atorvastatin (LIPITOR) 20 MG tablet 20 mg daily  6/21/21  Yes Historical Provider, MD   losartan-hydroCHLOROthiazide (HYZAAR) 100-12.5 MG per tablet Take 1 tablet by mouth daily 5/5/21  Yes [place] Ver[place]zco MD   metoprolol succinate (TOPROL XL) 50 MG extended release tablet Take 1 tablet by mouth 2 times daily 5/5/21  Yes [place] Verduzco MD   isosorbide mononitrate (IMDUR) 30 MG extended release tablet Take 1 tablet by mouth daily 4/26/21  Yes [place] Verduzco MD   Ca & Phos-Vit D-Mag [id number] TABS Take 1 tablet by mouth 2 times daily    Yes Historical Provider, MD   Multiple Vitamins-Minerals (THERAPEUTIC MULTIVITAMIN-MINERALS) tablet Take 1 tablet by mouth daily   Yes Historical Provider, MD   calcitonin (MIACALCIN) 200 UNIT/ACT nasal spray 1 spray by Nasal route daily  4/6/16  Yes Historical Provider, MD vitamin D (CHOLECALCIFEROL) 1000 UNIT TABS tablet Take 1,000 Int'l Units by mouth daily Takes two tabs daily   Yes Historical Provider, MD   levothyroxine (SYNTHROID) 75 MCG tablet Take 75 mcg by mouth Daily    Yes Historical Provider, MD   amitriptyline (ELAVIL) 25 MG tablet Take 25 mg by mouth nightly    Yes Historical Provider, MD   NEURONTIN 400 MG capsule Take 1 capsule by mouth 3 times daily for 30 days. 8/24/21 9/23/21  Jose Rees MD     Allergies   Allergen Reactions    Iv Dye [Iodides] Hives    Pcn [Penicillins] Rash    Norvasc [Amlodipine] Swelling    Aspirin Other (See Comments)     tingling    Bactrim Other (See Comments)     Yeast infection    Drixoral [Dexbrompheniramine-Pseudoeph] Other (See Comments)     hyper    Morphine Other (See Comments)     Iv line vein reddened     Social History     Socioeconomic History    Marital status:      Spouse name: Not on file    Number of children: Not on file    Years of education: Not on file    Highest education level: Not on file   Occupational History    Not on file   Tobacco Use    Smoking status: Never Smoker    Smokeless tobacco: Never Used   Vaping Use    Vaping Use: Never used   Substance and Sexual Activity    Alcohol use: No    Drug use: No    Sexual activity: Not Currently     Partners: Male   Other Topics Concern    Not on file   Social History Narrative    Not on file     Social Determinants of Health     Financial Resource Strain:     Difficulty of Paying Living Expenses: Not on file   Food Insecurity:     Worried About Running Out of Food in the Last Year: Not on file    Carlota of Food in the Last Year: Not on file   Transportation Needs:     Lack of Transportation (Medical): Not on file    Lack of Transportation (Non-Medical):  Not on file   Physical Activity:     Days of Exercise per Week: Not on file    Minutes of Exercise per Session: Not on file   Stress:     Feeling of Stress : Not on file   Social Connections:     Frequency of Communication with Friends and Family: Not on file    Frequency of Social Gatherings with Friends and Family: Not on file    Attends Mosque Services: Not on file    Active Member of Clubs or Organizations: Not on file    Attends Club or Organization Meetings: Not on file    Marital Status: Not on file   Intimate Partner Violence:     Fear of Current or Ex-Partner: Not on file    Emotionally Abused: Not on file    Physically Abused: Not on file    Sexually Abused: Not on file   Housing Stability:     Unable to Pay for Housing in the Last Year: Not on file    Number of [place] in the Last Year: Not on file    Unstable Housing in the Last Year: Not on file     Family History   Problem Relation Age of Onset    Kidney Disease Mother     Atrial Fibrillation Father     Coronary Art Dis Maternal Grandfather     Cancer Paternal Uncle         unknown     REVIEW OF SYSTEMS:     Allan Arlene denies fever/chills, chest pain, shortness of breath, new bowel or bladder complaints. All other review of systems was negative. PHYSICAL EXAMINATION:      /64   Pulse 78   Temp 96.6 °F (35.9 °C) (Infrared)   Resp 16   Ht 5' 5.5\" (1.664 m)   Wt 173 lb (78.5 kg)   SpO2 98%   BMI 28.35 kg/m²     General:       General appearance:   elderly, pleasant and well-hydrated. , in mild discomfort and A & O x3  Build:Overweight     HEENT:     Head:normocephalic and atraumatic  Sclera: icterus absent,      Lungs:     Breathing:Breathing Pattern: regular, no distress     Abdomen:     Shape:non-distended and normal  Tenderness:none     Lumbar spine:     Spine inspection:scoliosis   CVA tenderness:No   Palpation:tenderness paravertebral muscles, facet loading, left, right, positive and tenderness.   Range of motion:abnormal moderately Lateral bending, flexion, extension rotation bilateral and is  painful.     Musculoskeletal:     Trigger points in Paraveteral:absent bilaterally  SI joint tenderness:negative right, negative left  Trochanteric bursa tenderness:positive right, positive left  SLR:negative right, negative left, sitting      Extremities:     Tremors:None bilaterally upper and lower  Range of motion: pain with internal rotation of hips negative. Intact:Yes  Edema:+1 pitting bilaterally     Neurological:     Sensory:normal to light touch bilateral lower extremities  Motor:                       Right Quadriceps5/5          Left Quadriceps5/5           Right Gastrocnemius5/5    Left Gastrocnemius5/5  Right Ant Tibialis5/5  Left Ant Tibialis5/5  Reflexes:    Right Quadriceps reflex2+  Left Quadriceps reflex2+  Right Achilles reflex2+  Left Achilles reflex2+  Gait:normal     Dermatology:     Skin:no unusual rashes and no skin lesions    Exam unchanged       Impression:  Bilateral leg pain. Failed LESI. Plan:  Follow up on her bilateral leg pain with no acute issues, managed with current regimen. Continue with Gabapentin  400 mg TID  OARRS report reviewed 12/2021. Patient encouraged to stay active and to lose weight. Treatment plan discussed with the patient including medications side effects. We discussed with the patient that combining opioids, benzodiazepines, alcohol, illicit drugs or sleep aids increases the risk of respiratory depression including death. We discussed that these medications may cause drowsiness, sedation or dizziness and have counseled the patient not to drive or operate machinery. We have discussed that these medications will be prescribed only by one provider. We have discussed with the patient about age related risk factors and have thoroughly discussed the importance of taking these medications as prescribed. The patient verbalizes understanding. jacqui Triana M.D.

## 2021-12-17 NOTE — PROGRESS NOTES
Do you currently have any of the following:    Fever: No  Headache:  No  Cough: No  Shortness of breath: No  Exposed to anyone with these symptoms: No                                                                                                                Marshal Schwab presents to the [facility] on 12/17/2021. Denisse Rutherford is complaining of pain no pain today. The pain is intermittent. The pain is described as back pain is an ache, leg pain is tingling . tSaci Farah Pain is rated on her best day at a 3, on her worst day at a 8, and on average at a 4 on the VAS scale. She took her last dose of Neurontin . Denisse Rutherford does have issues with constipation. Any procedures since your last visit: No,     She is not on NSAIDS and  is not on anticoagulation medications to include none . Pacemaker or defibrillator: No.    Medication Contract and Consent for Opioid Use Documents Filed      No documents found                   /64   Pulse 78   Temp 96.6 °F (35.9 °C) (Infrared)   Resp 16   Ht 5' 5.5\" (1.664 m)   Wt 173 lb (78.5 kg)   SpO2 98%   BMI 28.35 kg/m²      No LMP recorded.  Patient is postmenopausal.

## 2021-12-27 ENCOUNTER — TELEPHONE (OUTPATIENT)
Dept: PAIN MANAGEMENT | Age: 86
End: 2021-12-27

## 2021-12-27 RX ORDER — GABAPENTIN 400 MG/1
400 CAPSULE ORAL 3 TIMES DAILY
Qty: 90 CAPSULE | Refills: 3 | Status: SHIPPED
Start: 2021-12-27 | End: 2022-04-13 | Stop reason: SDUPTHER

## 2022-01-19 DIAGNOSIS — D47.2 MONOCLONAL GAMMOPATHY: ICD-10-CM

## 2022-01-19 LAB
ANION GAP SERPL CALCULATED.3IONS-SCNC: 18 MMOL/L (ref 7–16)
BASOPHILS ABSOLUTE: 0.04 E9/L (ref 0–0.2)
BASOPHILS RELATIVE PERCENT: 0.6 % (ref 0–2)
BUN BLDV-MCNC: 19 MG/DL (ref 6–23)
CALCIUM SERPL-MCNC: 9.6 MG/DL (ref 8.6–10.2)
CHLORIDE BLD-SCNC: 103 MMOL/L (ref 98–107)
CO2: 19 MMOL/L (ref 22–29)
CREAT SERPL-MCNC: 1.1 MG/DL (ref 0.5–1)
EOSINOPHILS ABSOLUTE: 0.17 E9/L (ref 0.05–0.5)
EOSINOPHILS RELATIVE PERCENT: 2.4 % (ref 0–6)
GFR AFRICAN AMERICAN: 57
GFR NON-AFRICAN AMERICAN: 47 ML/MIN/1.73
GLUCOSE BLD-MCNC: 105 MG/DL (ref 74–99)
HCT VFR BLD CALC: 39.1 % (ref 34–48)
HEMOGLOBIN: 12.8 G/DL (ref 11.5–15.5)
IMMATURE GRANULOCYTES #: 0.03 E9/L
IMMATURE GRANULOCYTES %: 0.4 % (ref 0–5)
LYMPHOCYTES ABSOLUTE: 2.47 E9/L (ref 1.5–4)
LYMPHOCYTES RELATIVE PERCENT: 35 % (ref 20–42)
MCH RBC QN AUTO: 30.8 PG (ref 26–35)
MCHC RBC AUTO-ENTMCNC: 32.7 % (ref 32–34.5)
MCV RBC AUTO: 94 FL (ref 80–99.9)
MONOCYTES ABSOLUTE: 0.64 E9/L (ref 0.1–0.95)
MONOCYTES RELATIVE PERCENT: 9.1 % (ref 2–12)
NEUTROPHILS ABSOLUTE: 3.7 E9/L (ref 1.8–7.3)
NEUTROPHILS RELATIVE PERCENT: 52.5 % (ref 43–80)
PDW BLD-RTO: 13.6 FL (ref 11.5–15)
PLATELET # BLD: 225 E9/L (ref 130–450)
PMV BLD AUTO: 11.2 FL (ref 7–12)
POTASSIUM SERPL-SCNC: 4.1 MMOL/L (ref 3.5–5)
RBC # BLD: 4.16 E12/L (ref 3.5–5.5)
SODIUM BLD-SCNC: 140 MMOL/L (ref 132–146)
WBC # BLD: 7.1 E9/L (ref 4.5–11.5)

## 2022-01-21 LAB
ALBUMIN SERPL-MCNC: 3.2 G/DL (ref 3.5–4.7)
ALPHA-1-GLOBULIN: 0.2 G/DL (ref 0.2–0.4)
ALPHA-2-GLOBULIN: 1 G/DL (ref 0.5–1)
BETA GLOBULIN: 0.8 G/DL (ref 0.8–1.3)
ELECTROPHORESIS: ABNORMAL
GAMMA GLOBULIN: 1 G/DL (ref 0.7–1.6)
IGA: 118 MG/DL (ref 70–400)
IGG: 775 MG/DL (ref 700–1600)
IGM: 89 MG/DL (ref 40–230)
IMMUNOFIXATION RESULT, SERUM: NORMAL
KAPPA FREE LIGHT CHAINS QNT: 25.56 MG/L (ref 3.3–19.4)
KAPPA/LAMBDA FREE LIGHT CHAIN RATIO: 1.39 (ref 0.26–1.65)
LAMBDA FREE LIGHT CHAINS QNT: 18.4 MG/L (ref 5.71–26.3)
TOTAL PROTEIN: 6.2 G/DL (ref 6.4–8.3)

## 2022-01-24 LAB — BETA-2 MICROGLOBULIN: 3.3 MG/L (ref 0.6–2.4)

## 2022-01-26 ENCOUNTER — TELEPHONE (OUTPATIENT)
Dept: ONCOLOGY | Age: 87
End: 2022-01-26

## 2022-01-26 ENCOUNTER — OFFICE VISIT (OUTPATIENT)
Dept: ONCOLOGY | Age: 87
End: 2022-01-26
Payer: MEDICARE

## 2022-01-26 ENCOUNTER — HOSPITAL ENCOUNTER (OUTPATIENT)
Dept: INFUSION THERAPY | Age: 87
Discharge: HOME | End: 2022-01-26

## 2022-01-26 VITALS
HEIGHT: 65 IN | TEMPERATURE: 98.1 F | OXYGEN SATURATION: 100 % | DIASTOLIC BLOOD PRESSURE: 58 MMHG | SYSTOLIC BLOOD PRESSURE: 146 MMHG | BODY MASS INDEX: 29.99 KG/M2 | HEART RATE: 71 BPM | WEIGHT: 180 LBS

## 2022-01-26 DIAGNOSIS — D47.2 MONOCLONAL GAMMOPATHY: Primary | ICD-10-CM

## 2022-01-26 PROCEDURE — 99212 OFFICE O/P EST SF 10 MIN: CPT

## 2022-01-26 PROCEDURE — 99214 OFFICE O/P EST MOD 30 MIN: CPT | Performed by: INTERNAL MEDICINE

## 2022-01-26 NOTE — PROGRESS NOTES
[address] [facility]  [address]  [place] [address]  Dept: [phone]  Loc: [phone]  Attending Progress Note      Reason for Visit:   Monoclonal gammopathy. Referring Physician:  Kayleen Lennon MD    PCP:  Maxim Kincaid MD    History of Present Illness: The patient is a very pleasant 51-year-old lady, with a past medical history significant for hypertension, hypothyroidism, migraine headache, she is a retired RN, she was seen by neurology for peripheral neuropathy, she had a work-up done, including SPEP with BERE, revealing minor/faint band/minute amount of IgG kappa monoclonal protein, M spike 0.4G/DL. The patient has pain from peripheral neuropathy, overall stable. Also pain in the lateral aspect of the right thigh. Sometimes she has back pain when she bends over. No history of anemia, kidney disease or hypercalcemia. She has left knee pain, she had steroid injection done last month. Review of Systems;  CONSTITUTIONAL: No fever, chills. Fair appetite and energy level. ENMT: Eyes: No diplopia; Nose: No epistaxis. Mouth: No sore throat. RESPIRATORY: No hemoptysis, shortness of breath, cough. CARDIOVASCULAR: No chest pain, palpitations. GASTROINTESTINAL: No nausea/vomiting, abdominal pain, diarrhea/constipation. GENITOURINARY: No dysuria, urinary frequency, hematuria. NEURO: No syncope, presyncope, pos for migraine headache. Peripheral neuropathy.   Remainder:  ROS NEGATIVE    Past Medical History:      Diagnosis Date    Abdominal pain     Arthritis     l knee injections with dr Ary Garland    Chest pain     resolved    Difficult intubation 1-    document on chart    Hypertension     Hypothyroidism     Low back pain     Migraine headache     Neuropathy     sensory in feet     Osteopenia     Palpitation     resolved    Plantar fasciitis     Skipped heart beats     Tingling of both feet  Tinnitus     hissing bilateral     Patient Active Problem List   Diagnosis    Chest pain    Hyperlipidemia    Migraine headache    Plantar fasciitis    Osteopenia    Varicose veins of both lower extremities    Venous insufficiency of both lower extremities    Hypothyroidism    Left arm pain    Neuralgia and neuritis    Carpal tunnel syndrome of right wrist    Lumbar radiculopathy    Type 2 diabetes mellitus without complication, without long-term current use of insulin (HCC)    Numbness and tingling    Chronic pain syndrome    Lumbar facet arthropathy    Lumbar disc disorder    Spinal stenosis of lumbar region without neurogenic claudication        Past Surgical History:      Procedure Laterality Date    BLADDER SURGERY      mesh    BREAST RECONSTRUCTION Bilateral 2004, 2007    COLONOSCOPY  10/13/2020    DILATION AND CURETTAGE OF UTERUS      EYE SURGERY Bilateral     cataract removal    INCONTINENCE SURGERY      MASTECTOMY Bilateral 1982    for fibrocystic breast disease; has saline implants Ok to take B/P either arm    PAIN MANAGEMENT PROCEDURE Right 04/12/2021    LUMBAR EPIDURAL STEROID INJECTION L5-S1 RIGHT PARAMEDIAN WITH 80 DEPO **ALLERGIC TO IV DYE** performed by Rhonda Mackey MD at [address] [place] [address] ENDOVENOUS RF, 1ST VEIN Right 08/10/2018    RIGHT GREASTER SAPHENOUS VEIN STAB PHLEBECTOMIES performed by Daisha Atkinson MD at [facility] TONSILLECTOMY         Family History:  Family History   Problem Relation Age of Onset    Kidney Disease Mother     Atrial Fibrillation Father     Coronary Art Dis Maternal Grandfather     Cancer Paternal Uncle         unknown       Medications:  Reviewed and reconciled.     Social History:  Social History     Socioeconomic History    Marital status:      Spouse name: Not on file    Number of children: Not on file    Years of education: Not on file    Highest education level: Not on file   Occupational History    Not on file Tobacco Use    Smoking status: Never Smoker    Smokeless tobacco: Never Used   Vaping Use    Vaping Use: Never used   Substance and Sexual Activity    Alcohol use: No    Drug use: No    Sexual activity: Not Currently     Partners: Male   Other Topics Concern    Not on file   Social History Narrative    Not on file     Social Determinants of Health     Financial Resource Strain:     Difficulty of Paying Living Expenses: Not on file   Food Insecurity:     Worried About Running Out of Food in the Last Year: Not on file    Carlota of Food in the Last Year: Not on file   Transportation Needs:     Lack of Transportation (Medical): Not on file    Lack of Transportation (Non-Medical): Not on file   Physical Activity:     Days of Exercise per Week: Not on file    Minutes of Exercise per Session: Not on file   Stress:     Feeling of Stress : Not on file   Social Connections:     Frequency of Communication with Friends and Family: Not on file    Frequency of Social Gatherings with Friends and Family: Not on file    Attends Hoahaoism Services: Not on file    Active Member of [address] [place] [address] or Organizations: Not on file    Attends Club or Organization Meetings: Not on file    Marital Status: Not on file   Intimate Partner Violence:     Fear of Current or Ex-Partner: Not on file    Emotionally Abused: Not on file    Physically Abused: Not on file    Sexually Abused: Not on file   Housing Stability:     Unable to Pay for Housing in the Last Year: Not on file    Number of [place] in the Last Year: Not on file    Unstable Housing in the Last Year: Not on file       Allergies:   Allergies   Allergen Reactions    Iv Dye [Iodides] Hives    Pcn [Penicillins] Rash    Norvasc [Amlodipine] Swelling    Aspirin Other (See Comments)     tingling    Bactrim Other (See Comments)     Yeast infection    Drixoral [Dexbrompheniramine-Pseudoeph] Other (See Comments)     hyper    Morphine Other (See Comments)     Iv line vein reddened       Physical Exam:  BP (!) 146/58   Pulse 71   Temp 98.1 °F (36.7 °C)   Ht 5' 5\" (1.651 m)   Wt 180 lb (81.6 kg)   SpO2 100%   BMI 29.95 kg/m²   GENERAL: Alert, oriented x 3, not in acute distress. HEENT: PERRLA; EOMI. Oropharynx clear. NECK: Supple. No palpable cervical or supraclavicular lymphadenopathy. LUNGS: Good air entry bilaterally. No wheezing, crackles or rhonchi. CARDIOVASCULAR: Regular rate. No murmurs, rubs or gallops. ABDOMEN: Soft. Non-tender, non-distended. Positive bowel sounds. EXTREMITIES: Without clubbing, cyanosis, or edema. NEUROLOGIC: No focal deficits. ECOG PS 1    Impression/Plan:        The patient is a very pleasant 77-year-old lady, with a past medical history significant for hypertension, hypothyroidism, migraine headache, she is a retired RN, she was seen by neurology for peripheral neuropathy, she had a work-up done, including SPEP with BERE, revealing minor/faint band/minute amount of IgG kappa monoclonal protein, M spike 0.4G/DL. IgA 118, IgG 779, IgM 106. Patient with IgG kappa monoclonal gammopathy, the patient does not have anemia, hypercalcemia or kidney dysfunction, a work-up was ordered, the M spike is small, 0.3G/DL, IgA 131, IgG 907, IgM 114. Kappa one 8.76, lambda one 2.34, kappa to lambda ratio 1.52, beta-2 microglobulin is 2.2, a skeletal survey was done on 3/30/2021, revealing osteopenia, no focal lytic lesions, small sclerotic lesion in the right iliac bone, likely a bone island. The results were reviewed with the patient. 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   On 1/19/2022, she had repeat SPEP with immunofixation, she has an IgG kappa monoclonal protein, M spike is stable at 0.5G/DL, kappa is slightly elevated, with a normal kappa to lambda ratio, quantitative immunoglobin's are all within normal range, creatinine is 1.1, overall stable,, she does not have hypercalcemia or anemia. Recommended close follow-up, she will have SPEP, immunofixation, serum light chain assay, quantitative immunoglobulins and beta-2 microglobulin done in 3 months. We will order a skeletal survey when she returns for her next visit. RTC in 3 months, with blood work 1 week prior. Thank you for allowing us to participate in the care of Mrs. Cricket Boucher.     Nataliia Ramos MD   HEMATOLOGY/MEDICAL [address] MEDICAL ONCOL[facility]Y  [address] [place] [id number]  Dept: [phone] [address] [place]: [phone]

## 2022-01-28 ENCOUNTER — OFFICE VISIT (OUTPATIENT)
Dept: CARDIOLOGY CLINIC | Age: 87
End: 2022-01-28
Payer: MEDICARE

## 2022-01-28 VITALS
SYSTOLIC BLOOD PRESSURE: 118 MMHG | BODY MASS INDEX: 29.32 KG/M2 | WEIGHT: 176 LBS | DIASTOLIC BLOOD PRESSURE: 60 MMHG | HEIGHT: 65 IN | HEART RATE: 62 BPM | RESPIRATION RATE: 16 BRPM

## 2022-01-28 DIAGNOSIS — E78.5 HYPERLIPIDEMIA, UNSPECIFIED HYPERLIPIDEMIA TYPE: Primary | ICD-10-CM

## 2022-01-28 PROCEDURE — 93000 ELECTROCARDIOGRAM COMPLETE: CPT | Performed by: INTERNAL MEDICINE

## 2022-01-28 PROCEDURE — 99214 OFFICE O/P EST MOD 30 MIN: CPT | Performed by: INTERNAL MEDICINE

## 2022-01-28 RX ORDER — LOSARTAN POTASSIUM AND HYDROCHLOROTHIAZIDE 12.5; 1 MG/1; MG/1
1 TABLET ORAL DAILY
Qty: 90 TABLET | Refills: 3 | Status: SHIPPED | OUTPATIENT
Start: 2022-01-28

## 2022-01-28 RX ORDER — ISOSORBIDE MONONITRATE 30 MG/1
30 TABLET, EXTENDED RELEASE ORAL DAILY
Qty: 90 TABLET | Refills: 3 | Status: SHIPPED | OUTPATIENT
Start: 2022-01-28

## 2022-01-28 RX ORDER — ATORVASTATIN CALCIUM 20 MG/1
20 TABLET, FILM COATED ORAL DAILY
Qty: 90 TABLET | Refills: 3 | Status: SHIPPED | OUTPATIENT
Start: 2022-01-28

## 2022-01-28 RX ORDER — METOPROLOL SUCCINATE 50 MG/1
50 TABLET, EXTENDED RELEASE ORAL 2 TIMES DAILY
Qty: 180 TABLET | Refills: 3 | Status: SHIPPED | OUTPATIENT
Start: 2022-01-28

## 2022-01-28 NOTE — PROGRESS NOTES
OUTPATIENT CARDIOLOGY FOLLOW-UP    Name: Hilton Noel    Age: 82 y.o. Primary Care Physician: Gunnar Riley MD    Date of Service: 1/28/2022    Chief Complaint:   No chief complaint on file. Interim History:   Mrs. Rafaela Escoto is a 42-year-old female history of TIA, symptomatic palpitations, migraines, mild hyperlipidemia, hypothyroidism, history of breast cancer status post the mastectomy and reconstructive surgery is here for follow-up visit. She was seen in the office on 8/23/2021. Since her last visit, she has not had any ER visits or hospitalizations for cardiac related problems. She had a colonoscopy and was noted to have a polyp which was resected. She has been having swelling of the right leg more than the left without dyspnea, orthopnea, PND. She is still having chest tightness without palpitations and did not notice any aggravating or relieving factors. Her last lipid profile done on 5/5/2020 showed total cholesterol of 146 LDL 61 triglycerides 108 and HDL 63. She stopped atorvastatin in January 2021 due to tingling in her feet but not muscle pains. Now,  she is back on atorvastatin 20 mg p.o. daily. She is able to tolerate atorvastatin this time without any side effects. Now her blood pressures have been well controlled. No new cardiac complaints since last cardiology evaluation. She denies recent chest pain, SOB, palpitations, lightheadedness, dizziness, syncope, PND, or orthopnea. SR on EKG.     Review of Systems:   Cardiac: As per HPI  General: No fever, chills  Pulmonary: As per HPI  HEENT: No visual disturbances, difficult swallowing  GI: No nausea, vomiting  Endocrine: No thyroid disease or DM  Musculoskeletal: GRANDE x 4, no focal motor deficits  Skin: Intact, no rashes  Neuro/Psych: No headache or seizures    Past Medical History:  Past Medical History:   Diagnosis Date    Abdominal pain     Arthritis     l knee injections with dr Enriqueta Birch    Chest pain     resolved    Difficult intubation 1-    document on chart    Hypertension     Hypothyroidism     Low back pain     Migraine headache     Neuropathy     sensory in feet     Osteopenia     Palpitation     resolved    Plantar fasciitis     Skipped heart beats     Tingling of both feet     Tinnitus     hissing bilateral       Past Surgical History:  Past Surgical History:   Procedure Laterality Date    BLADDER SURGERY      mesh    BREAST RECONSTRUCTION Bilateral 2004, 2007    COLONOSCOPY  10/13/2020    DILATION AND CURETTAGE OF UTERUS      EYE SURGERY Bilateral     cataract removal    INCONTINENCE SURGERY      MASTECTOMY Bilateral 1982    for fibrocystic breast disease; has saline implants Ok to take B/P either arm    PAIN MANAGEMENT PROCEDURE Right 04/12/2021    LUMBAR EPIDURAL STEROID INJECTION L5-S1 RIGHT PARAMEDIAN WITH 80 DEPO **ALLERGIC TO IV DYE** performed by Juvenal Marx MD at [address] [place] [address] ENDOVENOUS RF, 1ST VEIN Right 08/10/2018    RIGHT GREASTER SAPHENOUS VEIN STAB [facility]LEBECTOMIES [facility]         Family History:  Family History   Problem Relation Age of Onset    Kidney Disease Mother     Atrial Fibrillation Father     Coronary Art Dis Maternal Grandfather     Cancer Paternal Uncle         unknown       Social History:  Social History     Socioeconomic History    Marital status:      Spouse name: Not on file    Number of children: Not on file    Years of education: Not on file    Highest education level: Not on file   Occupational History    Not on file   Tobacco Use    Smoking status: Never Smoker    Smokeless tobacco: Never Used   Vaping Use    Vaping Use: Never used   Substance and Sexual Activity    Alcohol use: No    Drug use: No    Sexual activity: Not Currently     Partners: Male   Other Topics Concern    Not on file   Social History Narrative    Not on file     Social Determinants of Health Financial Resource Strain:     Difficulty of Paying Living Expenses: Not on file   Food Insecurity:     Worried About Running Out of Food in the Last Year: Not on file    Carlota of Food in the Last Year: Not on file   Transportation Needs:     Lack of Transportation (Medical): Not on file    Lack of Transportation (Non-Medical): Not on file   Physical Activity:     Days of Exercise per Week: Not on file    Minutes of Exercise per Session: Not on file   Stress:     Feeling of Stress : Not on file   Social Connections:     Frequency of Communication with Friends and Family: Not on file    Frequency of Social Gatherings with Friends and Family: Not on file    Attends Hindu Services: Not on file    Active Member of [address] [place] [address] [employer] or Organizations: Not on file    Attends Club or Organization Meetings: Not on file    Marital Status: Not on file   Intimate Partner Violence:     Fear of Current or Ex-Partner: Not on file    Emotionally Abused: Not on file    Physically Abused: Not on file    Sexually Abused: Not on file   Housing Stability:     Unable to Pay for Housing in the Last Year: Not on file    Number of [place] in the Last Year: Not on file    Unstable Housing in the Last Year: Not on file       Allergies: Allergies   Allergen Reactions    Iv Dye [Iodides] Hives    Pcn [Penicillins] Rash    Norvasc [Amlodipine] Swelling    Aspirin Other (See Comments)     tingling    Bactrim Other (See Comments)     Yeast infection    Drixoral [Dexbrompheniramine-Pseudoeph] Other (See Comments)     hyper    Morphine Other (See Comments)     Iv line vein reddened       Current Medications:  Current Outpatient Medications   Medication Sig Dispense Refill    gabapentin (NEURONTIN) 400 MG capsule Take 1 capsule by mouth 3 times daily for 30 days.  90 capsule 3    Vibegron (GEMTESA) 75 MG TABS Take by mouth daily      atorvastatin (LIPITOR) 20 MG tablet 20 mg daily       losartan-hydroCHLOROthiazide (HYZAAR) 100-12.5 MG per tablet Take 1 tablet by mouth daily 90 tablet 3    metoprolol succinate (TOPROL XL) 50 MG extended release tablet Take 1 tablet by mouth 2 times daily 180 tablet 3    isosorbide mononitrate (IMDUR) 30 MG extended release tablet Take 1 tablet by mouth daily 30 tablet 11    Ca & Phos-Vit D-Mag [phone] TABS Take 1 tablet by mouth 2 times daily       Multiple Vitamins-Minerals (THERAPEUTIC MULTIVITAMIN-MINERALS) tablet Take 1 tablet by mouth daily      calcitonin (MIACALCIN) 200 UNIT/ACT nasal spray 1 spray by Nasal route daily       vitamin D (CHOLECALCIFEROL) 1000 UNIT TABS tablet Take 1,000 Int'l Units by mouth daily Takes two tabs daily      levothyroxine (SYNTHROID) 75 MCG tablet Take 75 mcg by mouth Daily       amitriptyline (ELAVIL) 25 MG tablet Take 25 mg by mouth nightly        Current Facility-Administered Medications   Medication Dose Route Frequency Provider Last Rate Last Admin    regadenoson (LEXISCAN) injection 0.4 mg  0.4 mg IntraVENous ONCE PRN Dillon Xiong MD           Physical Exam:  There were no vitals taken for this visit. Wt Readings from Last 3 Encounters:   01/26/22 180 lb (81.6 kg)   12/17/21 173 lb (78.5 kg)   10/26/21 174 lb (78.9 kg)     Appearance: Awake, alert and oriented x 3, no acute respiratory distress  Skin: Intact, no rash  Head: Normocephalic, atraumatic  Eyes: EOMI, no conjunctival erythema  ENMT: No pharyngeal erythema, MMM, no rhinorrhea  Neck: Supple, no elevated JVP, no carotid bruits  Lungs: Clear to auscultation bilaterally. No wheezes, rales, or rhonchi.   Cardiac: Regular rate and rhythm, +S1S2, no murmurs apparent  Abdomen: Soft, nontender, +bowel sounds  Extremities: Moves all extremities x 4, no lower extremity edema right more than left  Neurologic: No focal motor deficits apparent, normal mood and affect, alert and oriented x 3  Peripheral Pulses: Intact posterior tibial pulses bilaterally    Laboratory Tests:  Lab Results Component Value Date    CREATININE 1.1 (H) 01/19/2022    BUN 19 01/19/2022     01/19/2022    K 4.1 01/19/2022     01/19/2022    CO2 19 (L) 01/19/2022     No results found for: MG  Lab Results   Component Value Date    WBC 7.1 01/19/2022    HGB 12.8 01/19/2022    HCT 39.1 01/19/2022    MCV 94.0 01/19/2022     01/19/2022     Lab Results   Component Value Date    ALT 18 10/19/2021    AST 22 10/19/2021    ALKPHOS 98 10/19/2021    BILITOT 0.7 10/19/2021     Lab Results   Component Value Date    CKTOTAL 83 03/05/2021    CKMB 1.4 02/19/2020    TROPONINI <0.01 02/20/2020    TROPONINI <0.01 02/19/2020    TROPONINI <0.01 02/08/2014     Lab Results   Component Value Date    INR 1.0 08/10/2018    INR 1.0 02/07/2014    PROTIME 11.4 08/10/2018    PROTIME 11.8 02/07/2014     Lab Results   Component Value Date    TSH 0.060 (L) 05/05/2020     Lab Results   Component Value Date    LABA1C 5.9 (H) 03/05/2021     No results found for: EAG  Lab Results   Component Value Date    CHOL 146 05/05/2020    CHOL 213 (H) 02/20/2020    CHOL 162 05/15/2012     Lab Results   Component Value Date    TRIG 108 05/05/2020    TRIG 103 02/20/2020    TRIG 111 05/15/2012     Lab Results   Component Value Date    HDL 63 05/05/2020    HDL 60 02/20/2020    HDL 46.0 05/15/2012     Lab Results   Component Value Date    LDLCALC 61 05/05/2020    LDLCALC 132 (H) 02/20/2020    LDLCALC 94 05/15/2012     Lab Results   Component Value Date    LABVLDL 22 05/05/2020    LABVLDL 21 02/20/2020     No results found for: CHOLHDLRATIO    Cardiac Tests:  ECG: normal sinus rhythm, first-degree AV block,  normal EKG. No changes    Echocardiogram: 8/15/2011-LVEF 39%, stage I diastolic dysfunction, mild TR.    TTE-2/20/2020:  Normal left ventricle size and systolic function. Ejection fraction is visually estimated at 60-65%. No regional wall motion abnormalities seen. Normal left ventricle wall thickness. Normal diastolic function.    Normal right ventricular size and function. TAPSE 18 mm. No hemodynamically significant aortic stenosis is present. No significant valvular abnormalities. RVSP is 20 mmHg. Normal estimated PA systolic pressure. No evidence for hemodynamically significant pericardial effusion. No previous echo for comparison    Stress test:  2/8/2014-no reversible ischemia, no defect or infarction. LVEF 77%. Lexiscan nuclear stress test-2/20/2020: LVEF 89%, no reversible perfusion defect, normal wall motion. Lexiscan nuclear stress test-8/31/2021  Impression:    1. ECG during the infusion did not change. 2. The myocardial perfusion imaging was normal.    3. Overall left ventricular systolic function was normal without regional wall motion abnormalities. 4. Low risk general pharmacologic stress test.      The ASCVD Risk score ([address] et al., 2013) failed to calculate for the following reasons: The 2013 ASCVD risk score is only valid for ages 36 to 78    Lipid panel done on 4/30/2019: Total cholesterol 164, LDL 88, HDL 61, triglycerides 74, renal functions were normal with a creatinine of 0.8 and CBC was normal.    ASSESSMENT:  1. Palpitations most likely secondary to premature atrial complexes well controlled on Toprol  2. Atypical chest pain, stress test showed no evidence of ischemia. No further episodes of chest pain. .   3. Hyperlipidemia stable and on statins, LDL is well controlled at goal level. 4. Hypertension, well controlled. 5. History of TIA  6. Hypothyroidism on hormone replacement therapy  7. Peripheral edema right more than left, resolved. 8. Allergic aspirin. Plan:   · Stress test results were reviewed and the results were normal showed no evidence of ischemia or infarct. · Continue metoprolol 50 mg twice a day, Imdur 30 mg po daily  · Continue  losartan hydrochlorothiazide to losartan/HCTZ 100/12.5 mg 1 p.o. daily for hypertension. · Continue atorvastatin 20 mg p.o. daily.   · Continue regular exercise   · She was advised to continue using compression stockings  · Will obtain lipid panel results from primary doctor Dr. Sindhu Aparicio. · Follow up with me in 12 months. The patient's current medication list, allergies, problem list and results of all previously ordered testing were reviewed at today's visit.   Lisandro Perrin MD  [facility]) Cardiology

## 2022-01-28 NOTE — PATIENT INSTRUCTIONS
· Stress test results were reviewed and the results were normal showed no evidence of ischemia or infarct. · Continue metoprolol 50 mg twice a day, Imdur 30 mg po daily  · Continue  losartan hydrochlorothiazide to losartan/HCTZ 100/12.5 mg 1 p.o. daily for hypertension. · Continue atorvastatin 20 mg p.o. daily. · Continue regular exercise   · She was advised to continue using compression stockings  · Will obtain lipid panel results from primary doctor Dr. Kristen Kraus. · Follow up with me in 12 months.

## 2022-02-11 DIAGNOSIS — E78.5 HYPERLIPIDEMIA, UNSPECIFIED HYPERLIPIDEMIA TYPE: Primary | ICD-10-CM

## 2022-02-14 DIAGNOSIS — E78.5 HYPERLIPIDEMIA, UNSPECIFIED HYPERLIPIDEMIA TYPE: ICD-10-CM

## 2022-02-14 LAB
CHOLESTEROL, TOTAL: 138 MG/DL (ref 0–199)
HDLC SERPL-MCNC: 53 MG/DL
LDL CHOLESTEROL CALCULATED: 52 MG/DL (ref 0–99)
TRIGL SERPL-MCNC: 167 MG/DL (ref 0–149)
VLDLC SERPL CALC-MCNC: 33 MG/DL

## 2022-02-15 ENCOUNTER — TELEPHONE (OUTPATIENT)
Dept: CARDIOLOGY CLINIC | Age: 87
End: 2022-02-15

## 2022-02-15 NOTE — TELEPHONE ENCOUNTER
----- Message from Violeta Bynum MD sent at 2/15/2022  3:35 PM EST -----  Her cholesterol levels are well controlled.

## 2022-04-13 ENCOUNTER — OFFICE VISIT (OUTPATIENT)
Dept: PAIN MANAGEMENT | Age: 87
End: 2022-04-13
Payer: MEDICARE

## 2022-04-13 VITALS
BODY MASS INDEX: 29.32 KG/M2 | HEIGHT: 65 IN | RESPIRATION RATE: 16 BRPM | SYSTOLIC BLOOD PRESSURE: 122 MMHG | HEART RATE: 62 BPM | OXYGEN SATURATION: 98 % | WEIGHT: 176 LBS | DIASTOLIC BLOOD PRESSURE: 80 MMHG | TEMPERATURE: 95.5 F

## 2022-04-13 DIAGNOSIS — M47.816 LUMBAR FACET ARTHROPATHY: ICD-10-CM

## 2022-04-13 DIAGNOSIS — M48.061 SPINAL STENOSIS OF LUMBAR REGION WITHOUT NEUROGENIC CLAUDICATION: Primary | ICD-10-CM

## 2022-04-13 DIAGNOSIS — M51.9 LUMBAR DISC DISORDER: ICD-10-CM

## 2022-04-13 DIAGNOSIS — M47.816 LUMBAR SPONDYLOSIS: ICD-10-CM

## 2022-04-13 DIAGNOSIS — G89.4 CHRONIC PAIN SYNDROME: ICD-10-CM

## 2022-04-13 DIAGNOSIS — M79.2 NEURALGIA AND NEURITIS: ICD-10-CM

## 2022-04-13 PROCEDURE — 99213 OFFICE O/P EST LOW 20 MIN: CPT | Performed by: PAIN MEDICINE

## 2022-04-13 RX ORDER — GABAPENTIN 400 MG/1
400 CAPSULE ORAL 3 TIMES DAILY
Qty: 90 CAPSULE | Refills: 3 | Status: SHIPPED
Start: 2022-04-13 | End: 2022-08-16 | Stop reason: SDUPTHER

## 2022-04-13 NOTE — PROGRESS NOTES
Do you currently have any of the following:    Fever: No  Headache:  No  Cough: No  Shortness of breath: No  Exposed to anyone with these symptoms: No                                                                                                                Joan Peres presents to the [facility] on 4/13/2022. Malcolm Baker is complaining of pain in her legs, tingling all the time. . The pain is constant. The pain is described as tingling. . Pain is rated on her best day at a 3, on her worst day at a 7, and on average at a 5 on the VAS scale. She took her last dose of Neurontin . Malcolm Baker does not have issues with constipation. Any procedures since your last visit: No,    She is not on NSAIDS and  is not on anticoagulation medications to include none and is managed by NA. Pacemaker or defibrillator: No Physician managing device is NA. Medication Contract and Consent for Opioid Use Documents Filed      No documents found                   /80   Pulse 62   Temp 95.5 °F (35.3 °C) (Infrared)   Resp 16   Ht 5' 5\" (1.651 m)   Wt 176 lb (79.8 kg)   SpO2 98%   BMI 29.29 kg/m²      No LMP recorded.  Patient is postmenopausal.

## 2022-04-13 NOTE — PROGRESS NOTES
[address]  [address] [facility], [address] [place] [address] [place]  [phone]    Follow up Note      Humberto Villavicencio     Date of Visit:  4/13/2022    CC:  Patient presents for follow up   Chief Complaint   Patient presents with    Chronic Pain     tingling in bilateral legs     HPI:    Pain is tolerable  Appropriate analgesia with current medications regimen: yes - . Change in quality of symptoms:none  Medication side effects:none. Recent diagnostic testing:none. Recent interventional procedures:none    She has not been on anticoagulation medications to include none. The patient  has not been on herbal supplements. The patient is not diabetic.     Imaging:   Lumbar spine MRI   Multilevel lumbar spondylosis most pronounced at L3-4, there is diffuse disc bulge with facet hypertrophy resulting in moderate central canal and bilateral foraminal narrowing. Mild right foraminal narrowing at L2-3 and mild bilateral foraminal narrowing at L4-5 and L5-S1.     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     The study also reveals evidence of chronic neurogenic changes in the right S1 myotome indicative of a right chronic S1 radiculopathy.     Previous treatments: Physical Therapy and medications. .         Potential Aberrant Drug-Related Behavior:  No      Urine Drug Screening:  None    OARRS report:  04/2022 consistent    Opioid Agreement:  Renewal date:N/A    Past Medical History:   Diagnosis Date    Abdominal pain     Arthritis     l knee injections with dr Mary Jane Kiran    Chest pain     resolved    Difficult intubation 1-    document on chart    Hypertension     Hypothyroidism     Low back pain     Migraine headache     Neuropathy     sensory in feet     Osteopenia     Palpitation     resolved    Plantar fasciitis     Skipped heart beats     Tingling of both feet     Tinnitus     hissing bilateral     Past Surgical History:   Procedure Laterality Date    BLADDER SURGERY      mesh    BREAST RECONSTRUCTION Bilateral 2004, 2007    COLONOSCOPY  10/13/2020    DILATION AND CURETTAGE OF UTERUS      EYE SURGERY Bilateral     cataract removal    INCONTINENCE SURGERY      MASTECTOMY Bilateral 1982    for fibrocystic breast disease; has saline implants Ok to take B/P either arm    PAIN MANAGEMENT PROCEDURE Right 04/12/2021    LUMBAR EPIDURAL STEROID INJECTION L5-S1 RIGHT PARAMEDIAN WITH 80 DEPO **ALLERGIC TO IV DYE** performed by Elodia Bradford MD at [address] [place] [address] ENDOVENOUS RF, 1ST VEIN Right 08/10/2018    RIGHT GREASTER SAPHENOUS VEIN STAB PHLEBECTOMIES performed by Nathaly Salvador MD at [facility]       Prior to Admission medications    Medication Sig Start Date End Date Taking?  Authorizing Provider   psyllium (KONSYL) 28.3 % PACK Take 1 packet by mouth as needed for Constipation   Yes Historical Provider, MD   isosorbide mononitrate (IMDUR) 30 MG extended release tablet Take 1 tablet by mouth daily 1/28/22  Yes Kirit Forman MD   losartan-hydroCHLOROthiazide [facility]) 100-12.5 MG per tablet Take 1 tablet by mouth daily 1/28/22  Yes Kirit Forman MD   metoprolol succinate (TOPROL XL) 50 MG extended release tablet Take 1 tablet by mouth 2 times daily 1/28/22  Yes Kirit Forman MD   atorvastatin (LIPITOR) 20 MG tablet Take 1 tablet by mouth daily 1/28/22  Yes Kirit Forman MD   Vibegron (Abhishek Provo) 75 MG TABS Take by mouth daily   Yes Historical Provider, MD   Ca & Phos-Vit D-Mag [phone] TABS Take 1 tablet by mouth 2 times daily    Yes Historical Provider, MD   Multiple Vitamins-Minerals (THERAPEUTIC MULTIVITAMIN-MINERALS) tablet Take 1 tablet by mouth daily   Yes Historical Provider, MD   calcitonin (MIACALCIN) 200 UNIT/ACT nasal spray 1 spray by Nasal route daily  4/6/16  Yes Historical Provider, MD   vitamin D (CHOLECALCIFEROL) 1000 UNIT TABS tablet Take 1,000 Int'l Units by mouth daily Takes two tabs daily   Yes Historical Provider, MD   levothyroxine (SYNTHROID) 75 MCG tablet Take 75 mcg by mouth Daily    Yes Historical Provider, MD   amitriptyline (ELAVIL) 25 MG tablet Take 25 mg by mouth nightly    Yes Historical Provider, MD   gabapentin (NEURONTIN) 400 MG capsule Take 1 capsule by mouth 3 times daily for 30 days. 12/27/21 1/28/22  Jose Ramírez MD     Allergies   Allergen Reactions    Iv Dye [Iodides] Hives    Pcn [Penicillins] Rash    Norvasc [Amlodipine] Swelling    Aspirin Other (See Comments)     tingling    Bactrim Other (See Comments)     Yeast infection    Drixoral [Dexbrompheniramine-Pseudoeph] Other (See Comments)     hyper    Morphine Other (See Comments)     Iv line vein reddened     Social History     Socioeconomic History    Marital status:      Spouse name: Not on file    Number of children: Not on file    Years of education: Not on file    Highest education level: Not on file   Occupational History    Not on file   Tobacco Use    Smoking status: Never Smoker    Smokeless tobacco: Never Used   Vaping Use    Vaping Use: Never used   Substance and Sexual Activity    Alcohol use: No    Drug use: No    Sexual activity: Not Currently     Partners: Male   Other Topics Concern    Not on file   Social History Narrative    Not on file     Social Determinants of Health     Financial Resource Strain:     Difficulty of Paying Living Expenses: Not on file   Food Insecurity:     Worried About Running Out of Food in the Last Year: Not on file    Carlota of Food in the Last Year: Not on file   Transportation Needs:     Lack of Transportation (Medical): Not on file    Lack of Transportation (Non-Medical):  Not on file   Physical Activity:     Days of Exercise per Week: Not on file    Minutes of Exercise per Session: Not on file   Stress:     Feeling of Stress : Not on file   Social Connections:     Frequency of Communication with Friends and Family: Not on file    Frequency of Social Gatherings with Friends and Family: Not on file    Attends Anabaptism Services: Not on file    Active Member of [address] [place] [address] or Organizations: Not on file    Attends Club or Organization Meetings: Not on file    Marital Status: Not on file   Intimate Partner Violence:     Fear of Current or Ex-Partner: Not on file    Emotionally Abused: Not on file    Physically Abused: Not on file    Sexually Abused: Not on file   Housing Stability:     Unable to Pay for Housing in the Last Year: Not on file    Number of [place] in the Last Year: Not on file    Unstable Housing in the Last Year: Not on file     Family History   Problem Relation Age of Onset    Kidney Disease Mother     Atrial Fibrillation Father     Coronary Art Dis Maternal Grandfather     Cancer Paternal Uncle         unknown     REVIEW OF SYSTEMS:     Eder Moore denies fever/chills, chest pain, shortness of breath, new bowel or bladder complaints. All other review of systems was negative. PHYSICAL EXAMINATION:      /80   Pulse 62   Temp 95.5 °F (35.3 °C) (Infrared)   Resp 16   Ht 5' 5\" (1.651 m)   Wt 176 lb (79.8 kg)   SpO2 98%   BMI 29.29 kg/m²     General:       General appearance:   elderly, pleasant and well-hydrated. , in mild discomfort and A & O x3  Build:Overweight     HEENT:     Head:normocephalic and atraumatic  Sclera: icterus absent,      Lungs:     Breathing:Breathing Pattern: regular, no distress     Abdomen:     Shape:non-distended and normal  Tenderness:none     Lumbar spine:     Spine inspection:scoliosis   CVA tenderness:No   Palpation:tenderness paravertebral muscles, facet loading, left, right, positive and tenderness.   Range of motion:abnormal moderately Lateral bending, flexion, extension rotation bilateral and is  painful.     Musculoskeletal:     Trigger points in Paraveteral:absent bilaterally  SI joint tenderness:negative right, negative left  Trochanteric bursa tenderness:positive right, positive left  SLR:negative right, negative left, sitting      Extremities:     Tremors:None bilaterally upper and lower  Range of motion: pain with internal rotation of hips negative. Intact:Yes  Edema:+1 pitting bilaterally     Neurological:     Sensory:normal to light touch bilateral lower extremities  Motor:                       Right Quadriceps5/5          Left Quadriceps5/5           Right Gastrocnemius5/5    Left Gastrocnemius5/5  Right Ant Tibialis5/5  Left Ant Tibialis5/5  Reflexes:    Right Quadriceps reflex2+  Left Quadriceps reflex2+  Right Achilles reflex2+  Left Achilles reflex2+  Gait:normal     Dermatology:     Skin:no unusual rashes and no skin lesions    Exam unchanged    Impression:  Bilateral leg pain. Failed LESI. Plan:  Follow up on her bilateral leg pain with no acute issues, managed with current regimen. Continue with Gabapentin 400 mg TID  OARRS report reviewed 04/2022. Patient encouraged to stay active and to lose weight. Treatment plan discussed with the patient including medications side effects. We discussed with the patient that combining opioids, benzodiazepines, alcohol, illicit drugs or sleep aids increases the risk of respiratory depression including death. We discussed that these medications may cause drowsiness, sedation or dizziness and have counseled the patient not to drive or operate machinery. We have discussed that these medications will be prescribed only by one provider. We have discussed with the patient about age related risk factors and have thoroughly discussed the importance of taking these medications as prescribed. The patient verbalizes understanding. jacqui Solano M.D.

## 2022-05-04 DIAGNOSIS — D47.2 MONOCLONAL GAMMOPATHY: ICD-10-CM

## 2022-05-04 LAB
ANION GAP SERPL CALCULATED.3IONS-SCNC: 8 MMOL/L (ref 7–16)
BASOPHILS ABSOLUTE: 0.04 E9/L (ref 0–0.2)
BASOPHILS RELATIVE PERCENT: 0.5 % (ref 0–2)
BUN BLDV-MCNC: 15 MG/DL (ref 6–23)
CALCIUM SERPL-MCNC: 9.6 MG/DL (ref 8.6–10.2)
CHLORIDE BLD-SCNC: 95 MMOL/L (ref 98–107)
CO2: 29 MMOL/L (ref 22–29)
CREAT SERPL-MCNC: 1 MG/DL (ref 0.5–1)
EOSINOPHILS ABSOLUTE: 0.11 E9/L (ref 0.05–0.5)
EOSINOPHILS RELATIVE PERCENT: 1.5 % (ref 0–6)
GFR AFRICAN AMERICAN: >60
GFR NON-AFRICAN AMERICAN: 53 ML/MIN/1.73
GLUCOSE BLD-MCNC: 96 MG/DL (ref 74–99)
HCT VFR BLD CALC: 41.8 % (ref 34–48)
HEMOGLOBIN: 14.2 G/DL (ref 11.5–15.5)
IMMATURE GRANULOCYTES #: 0.02 E9/L
IMMATURE GRANULOCYTES %: 0.3 % (ref 0–5)
LYMPHOCYTES ABSOLUTE: 2.01 E9/L (ref 1.5–4)
LYMPHOCYTES RELATIVE PERCENT: 26.6 % (ref 20–42)
MCH RBC QN AUTO: 30.8 PG (ref 26–35)
MCHC RBC AUTO-ENTMCNC: 34 % (ref 32–34.5)
MCV RBC AUTO: 90.7 FL (ref 80–99.9)
MONOCYTES ABSOLUTE: 0.66 E9/L (ref 0.1–0.95)
MONOCYTES RELATIVE PERCENT: 8.7 % (ref 2–12)
NEUTROPHILS ABSOLUTE: 4.72 E9/L (ref 1.8–7.3)
NEUTROPHILS RELATIVE PERCENT: 62.4 % (ref 43–80)
PDW BLD-RTO: 13.3 FL (ref 11.5–15)
PLATELET # BLD: 237 E9/L (ref 130–450)
PMV BLD AUTO: 10.4 FL (ref 7–12)
POTASSIUM SERPL-SCNC: 4.5 MMOL/L (ref 3.5–5)
RBC # BLD: 4.61 E12/L (ref 3.5–5.5)
SODIUM BLD-SCNC: 132 MMOL/L (ref 132–146)
WBC # BLD: 7.6 E9/L (ref 4.5–11.5)

## 2022-05-04 PROCEDURE — 36415 COLL VENOUS BLD VENIPUNCTURE: CPT | Performed by: INTERNAL MEDICINE

## 2022-05-06 LAB
ALBUMIN SERPL-MCNC: 3.4 G/DL (ref 3.5–4.7)
ALPHA-1-GLOBULIN: 0.2 G/DL (ref 0.2–0.4)
ALPHA-2-GLOBULIN: 1.1 G/DL (ref 0.5–1)
BETA GLOBULIN: 0.9 G/DL (ref 0.8–1.3)
ELECTROPHORESIS: ABNORMAL
GAMMA GLOBULIN: 1 G/DL (ref 0.7–1.6)
IGA: 109 MG/DL (ref 70–400)
IGG: 812 MG/DL (ref 700–1600)
IGM: 98 MG/DL (ref 40–230)
IMMUNOFIXATION RESULT, SERUM: NORMAL
KAPPA FREE LIGHT CHAINS QNT: 22.18 MG/L (ref 3.3–19.4)
KAPPA/LAMBDA FREE LIGHT CHAIN RATIO: 1.6 (ref 0.26–1.65)
LAMBDA FREE LIGHT CHAINS QNT: 13.84 MG/L (ref 5.71–26.3)
TOTAL PROTEIN: 6.5 G/DL (ref 6.4–8.3)

## 2022-05-11 ENCOUNTER — OFFICE VISIT (OUTPATIENT)
Dept: ONCOLOGY | Age: 87
End: 2022-05-11
Payer: MEDICARE

## 2022-05-11 ENCOUNTER — HOSPITAL ENCOUNTER (OUTPATIENT)
Dept: INFUSION THERAPY | Age: 87
Discharge: HOME | End: 2022-05-11

## 2022-05-11 VITALS
SYSTOLIC BLOOD PRESSURE: 143 MMHG | OXYGEN SATURATION: 100 % | HEIGHT: 65 IN | DIASTOLIC BLOOD PRESSURE: 62 MMHG | TEMPERATURE: 97.2 F | BODY MASS INDEX: 28.89 KG/M2 | WEIGHT: 173.4 LBS | HEART RATE: 64 BPM

## 2022-05-11 DIAGNOSIS — D47.2 MONOCLONAL GAMMOPATHY: Primary | ICD-10-CM

## 2022-05-11 PROCEDURE — 99213 OFFICE O/P EST LOW 20 MIN: CPT

## 2022-05-11 PROCEDURE — 99214 OFFICE O/P EST MOD 30 MIN: CPT | Performed by: INTERNAL MEDICINE

## 2022-05-11 NOTE — PROGRESS NOTES
[address] [facility] MEDICAL ONCOLOGY  [address] [facility]  [place] [address]  Dept: [phone]  Loc: [phone]  Attending Progress Note      Reason for Visit:   Monoclonal gammopathy. Referring Physician:  Ana Guido MD    PCP:  Elida Sawyer MD    History of Present Illness: The patient is a very pleasant 53-year-old lady, with a past medical history significant for hypertension, hypothyroidism, migraine headache, she is a retired RN, she was seen by neurology for peripheral neuropathy, she had a work-up done, including SPEP with BERE, revealing minor/faint band/minute amount of IgG kappa monoclonal protein, M spike 0.4G/DL. The patient has pain from peripheral neuropathy, overall stable. Also pain in the lateral aspect of the right thigh. Sometimes she has back pain when she bends over. No history of anemia, kidney disease or hypercalcemia. She has left knee pain, she has steroid injections done about every 4 months. She has low back pain, does not feel that she needs an injection at this time. Review of Systems;  CONSTITUTIONAL: No fever, chills. Fair appetite and energy level. ENMT: Eyes: No diplopia; Nose: No epistaxis. Mouth: No sore throat. RESPIRATORY: No hemoptysis, shortness of breath, cough. CARDIOVASCULAR: No chest pain, palpitations. GASTROINTESTINAL: No nausea/vomiting, abdominal pain, diarrhea/constipation. GENITOURINARY: No dysuria, urinary frequency, hematuria. NEURO: No syncope, presyncope, pos for migraine headache. Peripheral neuropathy.   Remainder:  ROS NEGATIVE    Past Medical History:      Diagnosis Date    Abdominal pain     Arthritis     l knee injections with dr Fatemeh Larsen    Chest pain     resolved    Difficult intubation 1-    document on chart    Hypertension     Hypothyroidism     Low back pain     Migraine headache     Neuropathy     sensory in feet     Osteopenia     Palpitation     resolved    Plantar fasciitis     Skipped heart beats     Tingling of both feet     Tinnitus     hissing bilateral     Patient Active Problem List   Diagnosis    Chest pain    Hyperlipidemia    Migraine headache    Plantar fasciitis    Osteopenia    Varicose veins of both lower extremities    Venous insufficiency of both lower extremities    Hypothyroidism    Left arm pain    Neuralgia and neuritis    Carpal tunnel syndrome of right wrist    Lumbar radiculopathy    Type 2 diabetes mellitus without complication, without long-term current use of insulin (HCC)    Numbness and tingling    Chronic pain syndrome    Lumbar facet arthropathy    Lumbar disc disorder    Spinal stenosis of lumbar region without neurogenic claudication        Past Surgical History:      Procedure Laterality Date    BLADDER SURGERY      mesh    BREAST RECONSTRUCTION Bilateral 2004, 2007    COLONOSCOPY  10/13/2020    DILATION AND CURETTAGE OF UTERUS      EYE SURGERY Bilateral     cataract removal    INCONTINENCE SURGERY      MASTECTOMY Bilateral 1982    for fibrocystic breast disease; has saline implants Ok to take B/P either arm    PAIN MANAGEMENT PROCEDURE Right 04/12/2021    LUMBAR EPIDURAL STEROID INJECTION L5-S1 RIGHT PARAMEDIAN WITH 80 DEPO **ALLERGIC TO IV DYE** performed by Kiki Frazier MD at [address] [place] [address] ENDOVENOUS RF, 1ST VEIN Right 08/10/2018    RIGHT GREASTER SAPHENOUS VEIN STAB PHLEBECTOMIES performed by Austin Benedict MD at [facility] 22 TONSILLECTOMY         Family History:  Family History   Problem Relation Age of Onset    Kidney Disease Mother     Atrial Fibrillation Father     Coronary Art Dis Maternal Grandfather     Cancer Paternal Uncle         unknown       Medications:  Reviewed and reconciled.     Social History:  Social History     Socioeconomic History    Marital status:      Spouse name: Not on file    Number of children: Not on file    Years of education: Not on file    Highest education level: Not on file   Occupational History    Not on file   Tobacco Use    Smoking status: Never Smoker    Smokeless tobacco: Never Used   Vaping Use    Vaping Use: Never used   Substance and Sexual Activity    Alcohol use: No    Drug use: No    Sexual activity: Not Currently     Partners: Male   Other Topics Concern    Not on file   Social History Narrative    Not on file     Social Determinants of Health     Financial Resource Strain:     Difficulty of Paying Living Expenses: Not on file   Food Insecurity:     Worried About Running Out of Food in the Last Year: Not on file    Carlota of Food in the Last Year: Not on file   Transportation Needs:     Lack of Transportation (Medical): Not on file    Lack of Transportation (Non-Medical): Not on file   Physical Activity:     Days of Exercise per Week: Not on file    Minutes of Exercise per Session: Not on file   Stress:     Feeling of Stress : Not on file   Social Connections:     Frequency of Communication with Friends and Family: Not on file    Frequency of Social Gatherings with Friends and Family: Not on file    Attends Latter day Services: Not on file    Active Member of [address] [place] [address] or Organizations: Not on file    Attends Club or Organization Meetings: Not on file    Marital Status: Not on file   Intimate Partner Violence:     Fear of Current or Ex-Partner: Not on file    Emotionally Abused: Not on file    Physically Abused: Not on file    Sexually Abused: Not on file   Housing Stability:     Unable to Pay for Housing in the Last Year: Not on file    Number of [place] in the Last Year: Not on file    Unstable Housing in the Last Year: Not on file       Allergies:   Allergies   Allergen Reactions    Iv Dye [Iodides] Hives    Pcn [Penicillins] Rash    Norvasc [Amlodipine] Swelling    Aspirin Other (See Comments)     tingling    Bactrim Other (See Comments)     Yeast infection    Drixoral [Dexbrompheniramine-Pseudoeph] Other (See Comments)     hyper    Morphine Other (See Comments)     Iv line vein reddened       Physical Exam:  BP (!) 143/62   Pulse 64   Temp 97.2 °F (36.2 °C)   Ht 5' 5\" (1.651 m)   Wt 173 lb 6.4 oz (78.7 kg)   SpO2 100%   BMI 28.86 kg/m²   GENERAL: Alert, oriented x 3, not in acute distress. HEENT: PERRLA; EOMI. Oropharynx clear. NECK: Supple. No palpable cervical or supraclavicular lymphadenopathy. LUNGS: Good air entry bilaterally. No wheezing, crackles or rhonchi. CARDIOVASCULAR: Regular rate. No murmurs, rubs or gallops. ABDOMEN: Soft. Non-tender, non-distended. Positive bowel sounds. EXTREMITIES: Without clubbing, cyanosis, or edema. NEUROLOGIC: No focal deficits. ECOG PS 1    Impression/Plan:        The patient is a very pleasant 62-year-old lady, with a past medical history significant for hypertension, hypothyroidism, migraine headache, she is a retired RN, she was seen by neurology for peripheral neuropathy, she had a work-up done, including SPEP with BERE, revealing minor/faint band/minute amount of IgG kappa monoclonal protein, M spike 0.4G/DL. IgA 118, IgG 779, IgM 106. Patient with IgG kappa monoclonal gammopathy, the patient does not have anemia, hypercalcemia or kidney dysfunction, a work-up was ordered, the M spike is small, 0.3G/DL, IgA 131, IgG 907, IgM 114. Kappa one 8.76, lambda one 2.34, kappa to lambda ratio 1.52, beta-2 microglobulin is 2.2, a skeletal survey was done on 3/30/2021, revealing osteopenia, no focal lytic lesions, small sclerotic lesion in the right iliac bone, likely a bone island. The results were reviewed with the patient. 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   On 5/4/2022, she had repeat SPEP with immunofixation, she has an IgG kappa monoclonal protein, M spike is stable at 0.4G/DL, kappa is trending down, normal lambda, with a normal kappa to lambda ratio, quantitative immunoglobins are all within normal range, creatinine is 1, had improved,  she does not have hypercalcemia or anemia. There is no evidence of progression to multiple myeloma. Recommended close follow-up, she will have SPEP, immunofixation, serum light chain assay, quantitative immunoglobulins and beta-2 microglobulin done in 3 months. Skeletal survey was ordered to be done prior to her next visit. RTC in 3 months, with blood work 1 week prior. Thank you for allowing us to participate in the care of Mrs. Socorro Walls.     David Lovell MD   HEMATOLOGY/MEDICAL [address] MEDICAL ONCOLOGY  [address] [place] [id number]  Dept: [phone] [address]resh: [phone]

## 2022-05-11 NOTE — PROGRESS NOTES
**Patient provided with discharge instructions. All questions answered. Patient understands follow up plan of care.

## 2022-05-24 ENCOUNTER — HOSPITAL ENCOUNTER (OUTPATIENT)
Dept: GENERAL RADIOLOGY | Age: 87
Discharge: HOME | End: 2022-05-26
Payer: MEDICARE

## 2022-05-24 ENCOUNTER — HOSPITAL ENCOUNTER (OUTPATIENT)
Age: 87
Discharge: HOME | End: 2022-05-26
Payer: MEDICARE

## 2022-05-24 DIAGNOSIS — D47.2 MONOCLONAL GAMMOPATHY: ICD-10-CM

## 2022-05-24 PROCEDURE — 77075 RADEX OSSEOUS SURVEY COMPL: CPT

## 2022-08-03 DIAGNOSIS — D47.2 MONOCLONAL GAMMOPATHY: ICD-10-CM

## 2022-08-03 LAB
ALBUMIN SERPL-MCNC: 4.2 G/DL (ref 3.5–5.2)
ALP BLD-CCNC: 98 U/L (ref 35–104)
ALT SERPL-CCNC: 12 U/L (ref 0–32)
ANION GAP SERPL CALCULATED.3IONS-SCNC: 12 MMOL/L (ref 7–16)
AST SERPL-CCNC: 21 U/L (ref 0–31)
BASOPHILS ABSOLUTE: 0.03 E9/L (ref 0–0.2)
BASOPHILS RELATIVE PERCENT: 0.4 % (ref 0–2)
BILIRUB SERPL-MCNC: 0.9 MG/DL (ref 0–1.2)
BUN BLDV-MCNC: 14 MG/DL (ref 6–23)
CALCIUM SERPL-MCNC: 9.7 MG/DL (ref 8.6–10.2)
CHLORIDE BLD-SCNC: 97 MMOL/L (ref 98–107)
CO2: 26 MMOL/L (ref 22–29)
CREAT SERPL-MCNC: 0.9 MG/DL (ref 0.5–1)
EOSINOPHILS ABSOLUTE: 0.16 E9/L (ref 0.05–0.5)
EOSINOPHILS RELATIVE PERCENT: 2.3 % (ref 0–6)
GFR AFRICAN AMERICAN: >60
GFR NON-AFRICAN AMERICAN: 59 ML/MIN/1.73
GLUCOSE BLD-MCNC: 88 MG/DL (ref 74–99)
HCT VFR BLD CALC: 38.4 % (ref 34–48)
HEMOGLOBIN: 12.7 G/DL (ref 11.5–15.5)
IMMATURE GRANULOCYTES #: 0.02 E9/L
IMMATURE GRANULOCYTES %: 0.3 % (ref 0–5)
LYMPHOCYTES ABSOLUTE: 2.12 E9/L (ref 1.5–4)
LYMPHOCYTES RELATIVE PERCENT: 31 % (ref 20–42)
MCH RBC QN AUTO: 30.2 PG (ref 26–35)
MCHC RBC AUTO-ENTMCNC: 33.1 % (ref 32–34.5)
MCV RBC AUTO: 91.2 FL (ref 80–99.9)
MONOCYTES ABSOLUTE: 0.59 E9/L (ref 0.1–0.95)
MONOCYTES RELATIVE PERCENT: 8.6 % (ref 2–12)
NEUTROPHILS ABSOLUTE: 3.92 E9/L (ref 1.8–7.3)
NEUTROPHILS RELATIVE PERCENT: 57.4 % (ref 43–80)
PDW BLD-RTO: 13.5 FL (ref 11.5–15)
PLATELET # BLD: 242 E9/L (ref 130–450)
PMV BLD AUTO: 10.4 FL (ref 7–12)
POTASSIUM SERPL-SCNC: 4.4 MMOL/L (ref 3.5–5)
RBC # BLD: 4.21 E12/L (ref 3.5–5.5)
SODIUM BLD-SCNC: 135 MMOL/L (ref 132–146)
WBC # BLD: 6.8 E9/L (ref 4.5–11.5)

## 2022-08-03 PROCEDURE — 36415 COLL VENOUS BLD VENIPUNCTURE: CPT | Performed by: INTERNAL MEDICINE

## 2022-08-05 LAB
ALBUMIN SERPL-MCNC: 3.4 G/DL (ref 3.5–4.7)
ALPHA-1-GLOBULIN: 0.3 G/DL (ref 0.2–0.4)
ALPHA-2-GLOBULIN: 1 G/DL (ref 0.5–1)
BETA GLOBULIN: 0.9 G/DL (ref 0.8–1.3)
ELECTROPHORESIS: ABNORMAL
GAMMA GLOBULIN: 1.1 G/DL (ref 0.7–1.6)
IGA: 111 MG/DL (ref 70–400)
IGG: 813 MG/DL (ref 700–1600)
IGM: 98 MG/DL (ref 40–230)
IMMUNOFIXATION RESULT, SERUM: NORMAL
TOTAL PROTEIN: 6.8 G/DL (ref 6.4–8.3)

## 2022-08-06 LAB
KAPPA FREE LIGHT CHAINS QNT: 25.35 MG/L (ref 3.3–19.4)
KAPPA/LAMBDA FREE LIGHT CHAIN RATIO: 1.57 (ref 0.26–1.65)
LAMBDA FREE LIGHT CHAINS QNT: 16.18 MG/L (ref 5.71–26.3)

## 2022-08-10 ENCOUNTER — OFFICE VISIT (OUTPATIENT)
Dept: ONCOLOGY | Age: 87
End: 2022-08-10
Payer: MEDICARE

## 2022-08-10 ENCOUNTER — HOSPITAL ENCOUNTER (OUTPATIENT)
Dept: INFUSION THERAPY | Age: 87
Discharge: HOME | End: 2022-08-10

## 2022-08-10 VITALS
SYSTOLIC BLOOD PRESSURE: 143 MMHG | HEIGHT: 65 IN | WEIGHT: 179.8 LBS | HEART RATE: 60 BPM | TEMPERATURE: 97 F | OXYGEN SATURATION: 99 % | BODY MASS INDEX: 29.96 KG/M2 | DIASTOLIC BLOOD PRESSURE: 55 MMHG

## 2022-08-10 DIAGNOSIS — D47.2 MONOCLONAL GAMMOPATHY: Primary | ICD-10-CM

## 2022-08-10 PROCEDURE — 99214 OFFICE O/P EST MOD 30 MIN: CPT | Performed by: INTERNAL MEDICINE

## 2022-08-10 PROCEDURE — 99212 OFFICE O/P EST SF 10 MIN: CPT

## 2022-08-10 PROCEDURE — 1123F ACP DISCUSS/DSCN MKR DOCD: CPT | Performed by: INTERNAL MEDICINE

## 2022-08-10 NOTE — PROGRESS NOTES
[address] [facility]  [address]  [facility] [address]  Dept: [phone]  Loc: [phone]  Attending Progress Note      Reason for Visit:   Monoclonal gammopathy. 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 The patient has pain from peripheral neuropathy, overall stable. Also pain in the lateral aspect of the right thigh. Sometimes she has back pain when she bends over. No history of anemia, kidney disease or hypercalcemia. No new bony pain. Review of Systems;  CONSTITUTIONAL: No fever, chills. Fair appetite and energy level. ENMT: Eyes: No diplopia; Nose: No epistaxis. Mouth: No sore throat. RESPIRATORY: No hemoptysis, shortness of breath, cough. CARDIOVASCULAR: No chest pain, palpitations. GASTROINTESTINAL: No nausea/vomiting, abdominal pain, diarrhea/constipation. GENITOURINARY: No dysuria, urinary frequency, hematuria. NEURO: No syncope, presyncope, pos for migraine headache. Peripheral neuropathy.   Remainder:  ROS NEGATIVE    Past Medical History:      Diagnosis Date    Abdominal pain     Arthritis     l knee injections with dr Jagdeep Caldera    Chest pain     resolved    Difficult intubation 1-    document on chart    Hypertension     Hypothyroidism     Low back pain     Migraine headache     Neuropathy     sensory in feet     Osteopenia     Palpitation     resolved    Plantar fasciitis     Skipped heart beats     Tingling of both feet     Tinnitus     hissing bilateral     Patient Active Problem List   Diagnosis    Chest pain    Hyperlipidemia    Migraine headache    Plantar fasciitis    Osteopenia    Varicose veins of both lower extremities    Venous insufficiency of both lower extremities    Hypothyroidism    Left arm pain    Neuralgia and neuritis    Carpal tunnel syndrome of right wrist    Lumbar radiculopathy    Type 2 diabetes mellitus without complication, without long-term current use of insulin (HCC)    Numbness and tingling    Chronic pain syndrome    Lumbar facet arthropathy    Lumbar disc disorder    Spinal stenosis of lumbar region without neurogenic claudication        Past Surgical History:      Procedure Laterality Date    BLADDER SURGERY      mesh    BREAST RECONSTRUCTION Bilateral 2004, 2007    COLONOSCOPY  10/13/2020    DILATION AND CURETTAGE OF UTERUS      EYE SURGERY Bilateral     cataract removal    INCONTINENCE SURGERY      MASTECTOMY Bilateral 1982    for fibrocystic breast disease; has saline implants Ok to take B/P either arm    PAIN MANAGEMENT PROCEDURE Right 04/12/2021    LUMBAR EPIDURAL STEROID INJECTION L5-S1 RIGHT PARAMEDIAN WITH 80 DEPO **ALLERGIC TO IV DYE** performed by Sharon Israel MD at [facility] [address] ENDOVENOUS RF, 1ST VEIN Right 08/10/2018    RIGHT GREASTER SAPHENOUS VEIN STAB PHLEBECTOMIES performed by Raisa Spicer MD at [address]         Family History:  Family History   Problem Relation Age of Onset    Kidney Disease Mother     Atrial Fibrillation Father     Coronary Art Dis Maternal Grandfather     Cancer Paternal Uncle         unknown       Medications:  Reviewed and reconciled.     Social History:  Social History     Socioeconomic History    Marital status:      Spouse name: Not on file    Number of children: Not on file    Years of education: Not on file    Highest education level: Not on file   Occupational History    Not on file   Tobacco Use    Smoking status: Never    Smokeless tobacco: Never   Vaping Use    Vaping Use: monoclonal gammopathy, the patient does not have anemia, hypercalcemia or kidney dysfunction, a work-up was ordered, the M spike is small, 0.3G/DL, IgA 131, IgG 907, IgM 114. Kappa one 8.76, lambda one 2.34, kappa to lambda ratio 1.52, beta-2 microglobulin is 2.2, a skeletal survey was done on 3/30/2021, revealing osteopenia, no focal lytic lesions, small sclerotic lesion in the right iliac bone, likely a bone island. The results were reviewed with the patient. 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 On 8/3/2022, she had repeat SPEP with immunofixation, she has an IgG kappa monoclonal protein, M spike is stable at 0.4G/DL, kappa had only slightly increased, normal lambda, with a normal kappa to lambda ratio, quantitative immunoglobins are all within normal range, creatinine is normal she does not have hypercalcemia or anemia. There is no evidence of progression to multiple myeloma. She had a skeletal survey done on 5/24/2022, revealing stable sclerotic focus associated with the right iliac wing, previously this was felt to be a bony island, new very small 4 mm lucency associated with the parietal region of the lateral view of the calvarium, could represent a small lytic lesion, recommended to continue with close follow-up as her labs did not change. She will have SPEP, immunofixation, serum light chain assay, quantitative immunoglobulins and beta-2 microglobulin done in 3 months. RTC in 3 months, with blood work 1 week prior. Thank you for allowing us to participate in the care of Mrs. Celestina Friend.     Hitesh Cope MD   HEMATOLOGY/MEDICAL [address] MEDICAL ONCOLOGY  [address] [place] [address]  [place] [address]  Dept: [phone] [address] Paresh: [phone]

## 2022-08-16 ENCOUNTER — OFFICE VISIT (OUTPATIENT)
Dept: PAIN MANAGEMENT | Age: 87
End: 2022-08-16
Payer: MEDICARE

## 2022-08-16 VITALS
WEIGHT: 179 LBS | BODY MASS INDEX: 29.82 KG/M2 | HEART RATE: 65 BPM | DIASTOLIC BLOOD PRESSURE: 80 MMHG | SYSTOLIC BLOOD PRESSURE: 120 MMHG | OXYGEN SATURATION: 91 % | TEMPERATURE: 97.1 F | HEIGHT: 65 IN | RESPIRATION RATE: 16 BRPM

## 2022-08-16 DIAGNOSIS — M47.816 LUMBAR SPONDYLOSIS: ICD-10-CM

## 2022-08-16 DIAGNOSIS — M48.061 SPINAL STENOSIS OF LUMBAR REGION WITHOUT NEUROGENIC CLAUDICATION: ICD-10-CM

## 2022-08-16 DIAGNOSIS — G89.4 CHRONIC PAIN SYNDROME: Primary | ICD-10-CM

## 2022-08-16 DIAGNOSIS — M47.816 LUMBAR FACET ARTHROPATHY: ICD-10-CM

## 2022-08-16 DIAGNOSIS — M79.2 NEURALGIA AND NEURITIS: ICD-10-CM

## 2022-08-16 DIAGNOSIS — M51.9 LUMBAR DISC DISORDER: ICD-10-CM

## 2022-08-16 PROCEDURE — 99213 OFFICE O/P EST LOW 20 MIN: CPT | Performed by: PAIN MEDICINE

## 2022-08-16 PROCEDURE — 1123F ACP DISCUSS/DSCN MKR DOCD: CPT | Performed by: PAIN MEDICINE

## 2022-08-16 RX ORDER — GABAPENTIN 400 MG/1
400 CAPSULE ORAL 3 TIMES DAILY
Qty: 270 CAPSULE | Refills: 1 | Status: SHIPPED | OUTPATIENT
Start: 2022-08-16 | End: 2022-11-14

## 2022-08-16 RX ORDER — ESTRADIOL 0.1 MG/G
CREAM VAGINAL
COMMUNITY
Start: 2022-08-12

## 2022-08-16 NOTE — PROGRESS NOTES
Do you currently have any of the following:    Fever: No  Headache:  No  Cough: No  Shortness of breath: No  Exposed to anyone with these symptoms: No                                                                                                                Prince Carey presents to the [address] [address] on 8/16/2022. Chapo Ruffin is complaining of pain in her legs. . The pain is constant. The pain is described as tingling. . Pain is rated on her best day at a 3, on her worst day at a 5, and on average at a 4 on the VAS scale. She took her last dose of Neurontin this AM. Chapo Ruffin does not have issues with constipation. Any procedures since your last visit: No,     She is not on NSAIDS and  is not on anticoagulation medications to include none and is managed by NA. Pacemaker or defibrillator: No Physician managing device is NA. Medication Contract and Consent for Opioid Use Documents Filed        No documents found                       /80   Pulse 65   Temp 97.1 °F (36.2 °C) (Infrared)   Resp 16   Ht 5' 5\" (1.651 m)   Wt 179 lb (81.2 kg)   SpO2 91%   BMI 29.79 kg/m²      No LMP recorded.  Patient is postmenopausal.

## 2022-08-16 NOTE — PROGRESS NOTES
[address]  [address] [facility], [address] [place] [address] [place]  [phone]    Follow up Note      Josephine Shape     Date of Visit:  8/16/2022    CC:  Patient presents for follow up   Chief Complaint   Patient presents with    Lower Back Pain     Back is pretty good, but she is still having a lot of tingling in her legs and feet     HPI:    Pain is tolerable  Appropriate analgesia with current medications regimen: yes - . Change in quality of symptoms:none  Medication side effects:none. Recent diagnostic testing:none. Recent interventional procedures:none    She has not been on anticoagulation medications to include none. The patient  has not been on herbal supplements. The patient is not diabetic. Imaging:   Lumbar spine MRI   Multilevel lumbar spondylosis most pronounced at L3-4, there is diffuse disc bulge with facet hypertrophy resulting in moderate central canal and bilateral foraminal narrowing. Mild right foraminal narrowing at L2-3 and mild bilateral foraminal narrowing at L4-5 and L5-S1. 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 The study also reveals evidence of chronic neurogenic changes in the right S1 myotome indicative of a right chronic S1 radiculopathy. Previous treatments: Physical Therapy and medications. .         Potential Aberrant Drug-Related Behavior:  No      Urine Drug Screening:  None    OARRS report:  08/2022 consistent    Opioid Agreement:  Renewal date:N/A    Past Medical History:   Diagnosis Date    Abdominal pain     Arthritis     l knee injections with dr Vanessa Buckley    Chest pain     resolved    Difficult intubation 1-    document on chart    Hypertension     Hypothyroidism     Low back pain     Migraine headache     Neuropathy     sensory in feet     Osteopenia     Palpitation     resolved    Plantar fasciitis     Skipped heart beats     Tingling of both feet     Tinnitus     hissing bilateral     Past Surgical History:   Procedure Laterality Date    BLADDER SURGERY      mesh    BREAST RECONSTRUCTION Bilateral 2004, 2007    COLONOSCOPY  10/13/2020    DILATION AND CURETTAGE OF UTERUS      EYE SURGERY Bilateral     cataract removal    INCONTINENCE SURGERY      MASTECTOMY Bilateral 1982    for fibrocystic breast disease; has saline implants Ok to take B/P either arm    PAIN MANAGEMENT PROCEDURE Right 04/12/2021    LUMBAR EPIDURAL STEROID INJECTION L5-S1 RIGHT PARAMEDIAN WITH 80 DEPO **ALLERGIC TO IV DYE** performed by Sreekanth Ramirez MD at [facility] [address], 1ST VEIN Right 08/10/2018    RIGHT GREASTER SAPHENOUS VEIN STAB PHLEBECTOMIES performed by Elieser Greco MD at [address] [facility]       Prior to Admission medications    Medication Sig Start Date End Date Taking?  Authorizing Provider   estradiol (ESTRACE) 0.1 MG/GM vaginal cream APPLY ONE GRAM INTRAVAGINALLY AT BEDTIME FOR 3 WEEKS THEN APPLY MON,WED,FRI THEREAFTER 8/12/22  Yes Historical Provider, MD   psyllium (KONSYL) 28.3 % PACK Take 1 packet by mouth as needed for Constipation   Yes Historical Provider, MD   isosorbide mononitrate (IMDUR) 30 MG extended release tablet Take 1 tablet by mouth daily 1/28/22  Yes Yvette Quan MD   losartan-hydroCHLOROthiazide [facility]) 100-12.5 MG per tablet Take 1 tablet by mouth daily 1/28/22  Yes Yvette Quan MD   metoprolol succinate (TOPROL XL) 50 MG extended release tablet Take 1 tablet by mouth 2 times daily 1/28/22  Yes Yvette Quan MD   atorvastatin (LIPITOR) 20 MG tablet Take 1 tablet by mouth daily 1/28/22  Yes Yvette Quan MD   Vibegron ([place]) 75 MG TABS Take by mouth daily   Yes Historical Provider, MD   Ca & Phos-Vit D-Mag [phone] TABS Take 1 tablet by mouth 2 times daily    Yes Historical Provider, MD   Multiple Vitamins-Minerals (THERAPEUTIC MULTIVITAMIN-MINERALS) tablet Take 1 tablet by mouth daily   Yes Historical Provider, MD   calcitonin (MIACALCIN) 200 UNIT/ACT nasal spray 1 spray by Nasal route daily  4/6/16  Yes Historical Provider, MD   vitamin D (CHOLECALCIFEROL) 1000 UNIT TABS tablet Take 1,000 Int'l Units by mouth daily Takes two tabs daily   Yes Historical Provider, MD   levothyroxine (SYNTHROID) 75 MCG tablet Take 75 mcg by mouth Daily    Yes Historical Provider, MD   amitriptyline (ELAVIL) 25 MG tablet Take 25 mg by mouth nightly    Yes Historical Provider, MD   gabapentin (NEURONTIN) 400 MG capsule Take 1 capsule by mouth 3 times daily for 30 days.  4/13/22 8/10/22  Elisabet Hadley MD     Allergies   Allergen Reactions    Iv Dye [Iodides] Hives    Pcn [Penicillins] Rash    Norvasc [Amlodipine] Swelling    Aspirin Other (See Comments)     tingling    Bactrim Other (See Comments)     Yeast infection    Drixoral [Dexbrompheniramine-Pseudoeph] Other (See Comments)     hyper    Morphine Other (See Comments)     Iv line vein reddened     Social History     Socioeconomic History    Marital status:      Spouse name: Not on file    Number of children: Not on file    Years of education: Not on file    Highest education level: Not on file   Occupational History    Not on file   Tobacco Use    Smoking status: Never    Smokeless tobacco: Never   Vaping Use    Vaping Use: Never used   Substance and Sexual Activity    Alcohol use: No    Drug use: No    Sexual activity: Not Currently     Partners: Male   Other Topics Concern    Not on file   Social History Narrative    Not on file     Social Determinants of Health     Financial Resource Strain: Not on file   Food Insecurity: Not on file   Transportation Needs: Not on file   Physical Activity: Not on file   Stress: Not on file   Social Connections: Not on file   Intimate Partner Violence: Not on file   Housing Stability: Not on file     Family History   Problem Relation Age of Onset    Kidney Disease Mother     Atrial Fibrillation Father     Coronary Art Dis Maternal Grandfather     Cancer Paternal Uncle         unknown     REVIEW OF SYSTEMS:     Vandana Maher denies fever/chills, chest pain, shortness of breath, new bowel or bladder complaints. All other review of systems was negative. PHYSICAL EXAMINATION:      /80   Pulse 65   Temp 97.1 °F (36.2 °C) (Infrared)   Resp 16   Ht 5' 5\" (1.651 m)   Wt 179 lb (81.2 kg)   SpO2 91%   BMI 29.79 kg/m²     General:       General appearance:   elderly, pleasant and well-hydrated. , in mild discomfort and A & O x3  Build:Overweight     HEENT:     Head:normocephalic and atraumatic  Sclera: icterus absent,      Lungs:     Breathing:Breathing Pattern: regular, no distress     Abdomen:     Shape:non-distended and normal  Tenderness:none     Lumbar spine:     Spine inspection:scoliosis   CVA tenderness:No   Palpation:tenderness paravertebral muscles, facet loading, left, right, positive and tenderness. Range of motion:not tested. Musculoskeletal:     Trigger points in Paraveteral:absent bilaterally  SI joint tenderness:negative right, negative left  Trochanteric bursa tenderness:positive right, positive left  SLR:negative right, negative left, sitting      Extremities:     Tremors:None bilaterally upper and lower  Range of motion: pain with internal rotation of hips negative.   Intact:Yes  Edema:+1 pitting bilaterally     Neurological:     Sensory:normal to light touch bilateral lower extremities  Motor:                       Right Quadriceps5/5          Left Quadriceps5/5           Right Gastrocnemius5/5    Left Gastrocnemius5/5  Right Ant Tibialis5/5  Left Ant Tibialis5/5  Reflexes:    Right Quadriceps reflex2+  Left Quadriceps reflex2+  Right Achilles reflex2+  Left Achilles reflex2+  Gait:normal     Dermatology:     Skin:no unusual rashes and no skin lesions    Exam unchanged    Impression:  Bilateral leg pain. Failed LESI. Plan:  Follow up on her bilateral leg pain with no acute issues, managed with current regimen. Continue with Gabapentin 400 mg TID  OARRS report reviewed 08/2022. Patient encouraged to stay active and to lose weight. Treatment plan discussed with the patient including medications side effects. We discussed with the patient that combining opioids, benzodiazepines, alcohol, illicit drugs or sleep aids increases the risk of respiratory depression including death. We discussed that these medications may cause drowsiness, sedation or dizziness and have counseled the patient not to drive or operate machinery. We have discussed that these medications will be prescribed only by one provider. We have discussed with the patient about age related risk factors and have thoroughly discussed the importance of taking these medications as prescribed. The patient verbalizes understanding. jacqui Benoit M.D.

## 2022-11-02 DIAGNOSIS — D47.2 MONOCLONAL GAMMOPATHY: Primary | ICD-10-CM

## 2022-11-02 DIAGNOSIS — D47.2 MONOCLONAL GAMMOPATHY: ICD-10-CM

## 2022-11-03 LAB — BETA-2 MICROGLOBULIN: 2.8 MG/L (ref 0.6–2.4)

## 2022-11-04 LAB
ALBUMIN SERPL-MCNC: 3.3 G/DL (ref 3.5–4.7)
ALPHA-1-GLOBULIN: 0.3 G/DL (ref 0.2–0.4)
ALPHA-2-GLOBULIN: 1 G/DL (ref 0.5–1)
BETA GLOBULIN: 0.9 G/DL (ref 0.8–1.3)
ELECTROPHORESIS: ABNORMAL
GAMMA GLOBULIN: 1 G/DL (ref 0.7–1.6)
IGA: 106 MG/DL (ref 70–400)
IGG: 810 MG/DL (ref 700–1600)
IGM: 89 MG/DL (ref 40–230)
IMMUNOFIXATION RESULT, SERUM: NORMAL
TOTAL PROTEIN: 6.5 G/DL (ref 6.4–8.3)

## 2022-11-05 LAB
KAPPA FREE LIGHT CHAINS QNT: 22.26 MG/L (ref 3.3–19.4)
KAPPA/LAMBDA FREE LIGHT CHAIN RATIO: 1.42 (ref 0.26–1.65)
LAMBDA FREE LIGHT CHAINS QNT: 15.73 MG/L (ref 5.71–26.3)

## 2022-11-09 ENCOUNTER — HOSPITAL ENCOUNTER (OUTPATIENT)
Dept: INFUSION THERAPY | Age: 87
Discharge: HOME | End: 2022-11-09

## 2022-11-09 ENCOUNTER — OFFICE VISIT (OUTPATIENT)
Dept: ONCOLOGY | Age: 87
End: 2022-11-09
Payer: MEDICARE

## 2022-11-09 VITALS
SYSTOLIC BLOOD PRESSURE: 145 MMHG | HEART RATE: 60 BPM | TEMPERATURE: 97.9 F | HEIGHT: 65 IN | WEIGHT: 176.4 LBS | DIASTOLIC BLOOD PRESSURE: 56 MMHG | OXYGEN SATURATION: 98 % | BODY MASS INDEX: 29.39 KG/M2

## 2022-11-09 DIAGNOSIS — D47.2 MONOCLONAL GAMMOPATHY: Primary | ICD-10-CM

## 2022-11-09 PROCEDURE — 99212 OFFICE O/P EST SF 10 MIN: CPT | Performed by: INTERNAL MEDICINE

## 2022-11-09 PROCEDURE — 99214 OFFICE O/P EST MOD 30 MIN: CPT | Performed by: INTERNAL MEDICINE

## 2022-11-09 PROCEDURE — 1123F ACP DISCUSS/DSCN MKR DOCD: CPT | Performed by: INTERNAL MEDICINE

## 2022-11-09 NOTE — PROGRESS NOTES
[id number] [facility] MEDICAL ONCOLOGY  [address] [place] [address]  Dept: [phone]  Loc: [phone]  Attending Progress Note      Reason for Visit:   Monoclonal gammopathy. Referring Physician:  Osiris Baker MD    PCP:  Ciara Lara MD    History of Present Illness: The patient is a very pleasant 70-year-old lady, with a past medical history significant for hypertension, hypothyroidism, migraine headache, she is a retired RN, she was seen by neurology for peripheral neuropathy, she had a work-up done, including SPEP with BERE, revealing minor/faint band/minute amount of IgG kappa monoclonal protein, M spike 0.4G/DL. The patient has pain from peripheral neuropathy, overall stable. Also pain in the lateral aspect of the right thigh. Sometimes she has back pain when she bends over. No history of anemia, kidney disease or hypercalcemia. She is doing well. No new bony pain. Review of Systems;  CONSTITUTIONAL: No fever, chills. Fair appetite and energy level. ENMT: Eyes: No diplopia; Nose: No epistaxis. Mouth: No sore throat. RESPIRATORY: No hemoptysis, shortness of breath, cough. CARDIOVASCULAR: No chest pain, palpitations. GASTROINTESTINAL: No nausea/vomiting, abdominal pain, has constipation (controlled). GENITOURINARY: No dysuria, urinary frequency, hematuria. NEURO: No syncope, presyncope, pos for migraine headache, none recently. Peripheral neuropathy (tingling) in lower legs. Denies any lightheadedness.    Remainder:  ROS NEGATIVE    Past Medical History:      Diagnosis Date    Abdominal pain     Arthritis     l knee injections with dr Joshua Jeffers    Chest pain     resolved    Difficult intubation 1-    document on chart    Hypertension     Hypothyroidism     Low back pain     Migraine headache     Neuropathy     sensory in feet     Osteopenia     Palpitation     resolved    Plantar fasciitis     Skipped heart beats     Tingling of both feet     Tinnitus     hissing bilateral     Patient Active Problem List   Diagnosis    Chest pain    Hyperlipidemia    Migraine headache    Plantar fasciitis    Osteopenia    Varicose veins of both lower extremities    Venous insufficiency of both lower extremities    Hypothyroidism    Left arm pain    Neuralgia and neuritis    Carpal tunnel syndrome of right wrist    Lumbar radiculopathy    Type 2 diabetes mellitus without complication, without long-term current use of insulin (HCC)    Numbness and tingling    Chronic pain syndrome    Lumbar facet arthropathy    Lumbar disc disorder    Spinal stenosis of lumbar region without neurogenic claudication        Past Surgical History:      Procedure Laterality Date    BLADDER SURGERY      mesh    BREAST RECONSTRUCTION Bilateral 2004, 2007    COLONOSCOPY  10/13/2020    DILATION AND CURETTAGE OF UTERUS      EYE SURGERY Bilateral     cataract removal    INCONTINENCE SURGERY      MASTECTOMY Bilateral 1982    for fibrocystic breast disease; has saline implants Ok to take B/P either arm    PAIN MANAGEMENT PROCEDURE Right 04/12/2021    LUMBAR EPIDURAL STEROID INJECTION L5-S1 RIGHT PARAMEDIAN WITH 80 DEPO **ALLERGIC TO IV DYE** performed by Jo-Ann Weir MD at [facility] [address], 1ST VEIN Right 08/10/2018    RIGHT GREASTER SAPHENOUS VEIN STAB PHLEBECTOMIES performed by Marjorie Quinteros MD at [facility]         Family History:  Family History   Problem Relation Age of Onset    Kidney Disease Mother     Atrial Fibrillation Father     Coronary Art Dis Maternal Grandfather     Cancer Paternal Uncle         unknown       Medications:  Reviewed and reconciled.     Social History:  Social History     Socioeconomic History    Marital status:      Spouse name: Not on file    Number of children: Not on file    Years of education: Not on file    Highest education level: Not on file   Occupational History    Not on file   Tobacco Use    Smoking status: Never    Smokeless tobacco: Never   Vaping Use    Vaping Use: Never used   Substance and Sexual Activity    Alcohol use: No    Drug use: No    Sexual activity: Not Currently     Partners: Male   Other Topics Concern    Not on file   Social History Narrative    Not on file     Social Determinants of Health     Financial Resource Strain: Not on file   Food Insecurity: Not on file   Transportation Needs: Not on file   Physical Activity: Not on file   Stress: Not on file   Social Connections: Not on file   Intimate Partner Violence: Not on file   Housing Stability: Not on file       Allergies: Allergies   Allergen Reactions    Iv Dye [Iodides] Hives    Pcn [Penicillins] Rash    Norvasc [Amlodipine] Swelling    Aspirin Other (See Comments)     tingling    Bactrim Other (See Comments)     Yeast infection    Drixoral [Dexbrompheniramine-Pseudoeph] Other (See Comments)     hyper    Morphine Other (See Comments)     Iv line vein reddened       Physical Exam:  BP (!) 145/56   Pulse 60   Temp 97.9 °F (36.6 °C)   Ht 5' 5\" (1.651 m)   Wt 176 lb 6.4 oz (80 kg)   SpO2 98%   BMI 29.35 kg/m²     GENERAL: Alert, oriented x 3, not in acute distress. HEENT: PERRLA; EOMI. Oropharynx clear. NECK: Supple. No palpable cervical or supraclavicular lymphadenopathy. LUNGS: Good air entry bilaterally. No wheezing, crackles or rhonchi. CARDIOVASCULAR: Regular rate. No murmurs, rubs or gallops. ABDOMEN: Soft. Non-tender, non-distended. Positive bowel sounds. EXTREMITIES: Without clubbing, cyanosis, or edema. Gets inj into left knee for OA. NEUROLOGIC: No focal deficits. Currently no lower back pain or hip pain.    ECOG PS 1    Impression/Plan:        The patient is a very pleasant 77-year-old lady, with a past medical history significant for hypertension, hypothyroidism, migraine headache, she is a retired RN, she was seen by neurology for peripheral neuropathy, she had a work-up done, including SPEP with BERE, revealing minor/faint band/minute amount of IgG kappa monoclonal protein, M spike 0.4G/DL. IgA 118, IgG 779, IgM 106. Patient with IgG kappa monoclonal gammopathy, the patient does not have anemia, hypercalcemia or kidney dysfunction, a work-up was ordered, the M spike is small, 0.3G/DL, IgA 131, IgG 907, IgM 114. Kappa one 8.76, lambda one 2.34, kappa to lambda ratio 1.52, beta-2 microglobulin is 2.2, a skeletal survey was done on 3/30/2021, revealing osteopenia, no focal lytic lesions, small sclerotic lesion in the right iliac bone, likely a bone island. The results were reviewed with the patient. 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 On 11/2/2022, she had repeat SPEP with immunofixation, she has an IgG kappa monoclonal protein, M spike is stable at 0.7G/DL, had improved to 22.26, normal lambda, with a normal kappa to lambda ratio, quantitative immunoglobins are all within normal range, CBC and BMP were not done, previously her creatinine was normal she does not have hypercalcemia or anemia. There is no evidence of progression to multiple myeloma. She had a skeletal survey done on 5/24/2022, revealing stable sclerotic focus associated with the right iliac wing, previously this was felt to be a bony island, new very small 4 mm lucency associated with the parietal region of the lateral view of the calvarium, could represent a small lytic lesion, recommended to continue with close follow-up as her labs did not change. She will have SPEP, immunofixation, serum light chain assay, quantitative immunoglobulins and beta-2 microglobulin done in 3 months. Will discuss having a bone marrow biopsy and aspirate done if she has any discerning findings on her labs or repeat skeletal survey. RTC in 3 months, with blood work 1 week prior.     Thank you for allowing us to participate in the care of Mrs. Jerad Liriano.     Hebert Bonilla MD   HEMATOLOGY/MEDICAL [address] MEDICAL ONCOLOGY  [address] [place] [address]  Dept: [phone] [place]: [phone]

## 2023-01-20 ENCOUNTER — OFFICE VISIT (OUTPATIENT)
Dept: CARDIOLOGY CLINIC | Age: 88
End: 2023-01-20
Payer: MEDICARE

## 2023-01-20 VITALS
RESPIRATION RATE: 14 BRPM | WEIGHT: 177.6 LBS | SYSTOLIC BLOOD PRESSURE: 128 MMHG | HEIGHT: 64 IN | HEART RATE: 65 BPM | BODY MASS INDEX: 30.32 KG/M2 | DIASTOLIC BLOOD PRESSURE: 58 MMHG

## 2023-01-20 DIAGNOSIS — I83.93 VARICOSE VEINS OF BOTH LOWER EXTREMITIES, UNSPECIFIED WHETHER COMPLICATED: Primary | ICD-10-CM

## 2023-01-20 PROCEDURE — 93000 ELECTROCARDIOGRAM COMPLETE: CPT | Performed by: INTERNAL MEDICINE

## 2023-01-20 PROCEDURE — 1123F ACP DISCUSS/DSCN MKR DOCD: CPT | Performed by: INTERNAL MEDICINE

## 2023-01-20 PROCEDURE — 99214 OFFICE O/P EST MOD 30 MIN: CPT | Performed by: INTERNAL MEDICINE

## 2023-01-20 RX ORDER — METOPROLOL SUCCINATE 50 MG/1
50 TABLET, EXTENDED RELEASE ORAL 2 TIMES DAILY
Qty: 180 TABLET | Refills: 3 | Status: SHIPPED | OUTPATIENT
Start: 2023-01-20

## 2023-01-20 RX ORDER — ISOSORBIDE MONONITRATE 30 MG/1
30 TABLET, EXTENDED RELEASE ORAL DAILY
Qty: 90 TABLET | Refills: 3 | Status: SHIPPED | OUTPATIENT
Start: 2023-01-20

## 2023-01-20 RX ORDER — ATORVASTATIN CALCIUM 20 MG/1
20 TABLET, FILM COATED ORAL DAILY
Qty: 90 TABLET | Refills: 3 | Status: SHIPPED | OUTPATIENT
Start: 2023-01-20

## 2023-01-20 RX ORDER — LOSARTAN POTASSIUM AND HYDROCHLOROTHIAZIDE 12.5; 1 MG/1; MG/1
1 TABLET ORAL DAILY
Qty: 90 TABLET | Refills: 3 | Status: SHIPPED | OUTPATIENT
Start: 2023-01-20

## 2023-01-20 NOTE — PATIENT INSTRUCTIONS
Noncardiac chest pain no further ischemic work-up is planned at this time  Continue metoprolol 50 mg twice a day, Imdur 30 mg po daily  Continue  losartan hydrochlorothiazide to losartan/HCTZ 100/12.5 mg 1 p.o. daily for hypertension. Continue atorvastatin 20 mg p.o. daily. Continue regular exercise   She was advised to continue using compression stockings  Lipid panel done in September 2022 was reviewed LDL 63, triglycerides 131 cholesterol 145 HDL 56, levels are well controlled  Follow up with me in 12 months.

## 2023-01-20 NOTE — PROGRESS NOTES
OUTPATIENT CARDIOLOGY FOLLOW-UP    Name: Cameron Maher    Age: 82 y.o. Primary Care Physician: Jen Vega MD    Date of Service: 1/20/2023    Chief Complaint:   Chief Complaint   Patient presents with    Hyperlipidemia       Interim History:   Mrs. Shayne Peres is a 62-year-old female history of TIA, symptomatic palpitations, migraines, mild hyperlipidemia, hypothyroidism, history of breast cancer status post the mastectomy and reconstructive surgery is here for follow-up visit. She was seen in the office on 1/28/2022. Since her last visit, she has not had any ER visits or hospitalizations for cardiac related problems. She had a colonoscopy and was noted to have a polyp which was resected. She has been having swelling of the right leg more than the left without dyspnea, orthopnea, PND. She is still having chest tightness without palpitations and did not notice any aggravating or relieving factors. She stopped atorvastatin in January 2021 due to tingling in her feet but not muscle pains. Now,  she is back on atorvastatin 20 mg p.o. daily. She is able to tolerate atorvastatin this time without any side effects. Now, her blood pressures have been well controlled. She noticed occasional twinges of chest pain over lower sternal region and lasts than 2 min and resolves spontaneously. The last episode of chest pain she had was more than 2 months back. The severity of the pain is mild and did not notice any associated symptoms such as dyspnea sweating or nausea and also no radiation of pain. She did not notice any exertional chest pain or dyspnea. She did notice intermittent episodes of right calf pain that been going on for several years without any swelling. She had evaluations done in the past without any definite diagnosis. Currently, she denies any symptoms. No new cardiac complaints since last cardiology evaluation.   She denies recent chest pain, SOB, palpitations, lightheadedness, dizziness, syncope, PND, or orthopnea. SR on EKG.     Review of Systems:   Cardiac: As per HPI  General: No fever, chills  Pulmonary: As per HPI  HEENT: No visual disturbances, difficult swallowing  GI: No nausea, vomiting  Endocrine: No thyroid disease or DM  Musculoskeletal: GRANDE x 4, no focal motor deficits  Skin: Intact, no rashes  Neuro/Psych: No headache or seizures    Past Medical History:  Past Medical History:   Diagnosis Date    Abdominal pain     Arthritis     l knee injections with dr Deloris Aquino    Chest pain     resolved    Difficult intubation 1-    document on chart    Hypertension     Hypothyroidism     Low back pain     Migraine headache     Neuropathy     sensory in feet     Osteopenia     Palpitation     resolved    Plantar fasciitis     Skipped heart beats     Tingling of both feet     Tinnitus     hissing bilateral       Past Surgical History:  Past Surgical History:   Procedure Laterality Date    BLADDER SURGERY      mesh    BREAST RECONSTRUCTION Bilateral 2004, 2007    COLONOSCOPY  10/13/2020    DILATION AND CURETTAGE OF UTERUS      EYE SURGERY Bilateral     cataract removal    INCONTINENCE SURGERY      MASTECTOMY Bilateral 1982    for fibrocystic breast disease; has saline implants Ok to take B/P either arm    PAIN MANAGEMENT PROCEDURE Right 04/12/2021    LUMBAR EPIDURAL STEROID INJECTION L5-S1 RIGHT PARAMEDIAN WITH 80 DEPO **ALLERGIC TO IV DYE** performed by Marco Contreras MD at [facility] RF 1ST VEIN Right 08/10/2018    RIGHT GREASTER SAPHENOUS VEIN STAB PHLEBECTOMIES performed by Lanita Riedel, MD at [facility] OR    TONSILLECTOMY         Family History:  Family History   Problem Relation Age of Onset    Kidney Disease Mother     Atrial Fibrillation Father     Coronary Art Dis Maternal Grandfather     Cancer Paternal Uncle         unknown       Social History:  Social History     Socioeconomic History    Marital status:      Spouse name: Not on file    Number of children: Not on file    Years of education: Not on file    Highest education level: Not on file   Occupational History    Not on file   Tobacco Use    Smoking status: Never    Smokeless tobacco: Never   Vaping Use    Vaping Use: Never used   Substance and Sexual Activity    Alcohol use: No    Drug use: No    Sexual activity: Not Currently     Partners: Male   Other Topics Concern    Not on file   Social History Narrative    Not on file     Social Determinants of Health     Financial Resource Strain: Not on file   Food Insecurity: Not on file   Transportation Needs: Not on file   Physical Activity: Not on file   Stress: Not on file   Social Connections: Not on file   Intimate Partner Violence: Not on file   Housing Stability: Not on file       Allergies: Allergies   Allergen Reactions    Iv Dye [Iodides] Hives    Pcn [Penicillins] Rash    Norvasc [Amlodipine] Swelling    Aspirin Other (See Comments)     tingling    Bactrim Other (See Comments)     Yeast infection    Drixoral [Dexbrompheniramine-Pseudoeph] Other (See Comments)     hyper    Morphine Other (See Comments)     Iv line vein reddened       Current Medications:  Current Outpatient Medications   Medication Sig Dispense Refill    gabapentin (NEURONTIN) 400 MG capsule Take 1 capsule by mouth in the morning and 1 capsule at noon and 1 capsule before bedtime. Do all this for 90 days.  270 capsule 1    isosorbide mononitrate (IMDUR) 30 MG extended release tablet Take 1 tablet by mouth daily 90 tablet 3    losartan-hydroCHLOROthiazide (HYZAAR) 100-12.5 MG per tablet Take 1 tablet by mouth daily 90 tablet 3    metoprolol succinate (TOPROL XL) 50 MG extended release tablet Take 1 tablet by mouth 2 times daily 180 tablet 3    atorvastatin (LIPITOR) 20 MG tablet Take 1 tablet by mouth daily 90 tablet 3    Vibegron (GEMTESA) 75 MG TABS Take by mouth daily      Ca & Phos-Vit D-Mag [phone] TABS Take 1 tablet by mouth 2 times daily Multiple Vitamins-Minerals (THERAPEUTIC MULTIVITAMIN-MINERALS) tablet Take 1 tablet by mouth daily      vitamin D (CHOLECALCIFEROL) 1000 UNIT TABS tablet Take 1,000 Int'l Units by mouth daily Takes two tabs daily      levothyroxine (SYNTHROID) 75 MCG tablet Take 75 mcg by mouth Daily       amitriptyline (ELAVIL) 25 MG tablet Take 25 mg by mouth nightly       estradiol (ESTRACE) 0.1 MG/GM vaginal cream APPLY ONE GRAM INTRAVAGINALLY AT BEDTIME FOR 3 WEEKS THEN APPLY MON,WED,FRI THEREAFTER (Patient not taking: Reported on 1/20/2023)      psyllium (KONSYL) 28.3 % PACK Take 1 packet by mouth as needed for Constipation       Current Facility-Administered Medications   Medication Dose Route Frequency Provider Last Rate Last Admin    regadenoson (LEXISCAN) injection 0.4 mg  0.4 mg IntraVENous ONCE PRN Brayan Montoya MD           Physical Exam:  BP (!) 128/58   Pulse 65   Resp 14   Ht 5' 4\" (1.626 m)   Wt 177 lb 9.6 oz (80.6 kg)   BMI 30.48 kg/m²   Wt Readings from Last 3 Encounters:   01/20/23 177 lb 9.6 oz (80.6 kg)   11/09/22 176 lb 6.4 oz (80 kg)   08/16/22 179 lb (81.2 kg)     Appearance: Awake, alert and oriented x 3, no acute respiratory distress  Skin: Intact, no rash  Head: Normocephalic, atraumatic  Eyes: EOMI, no conjunctival erythema  ENMT: No pharyngeal erythema, MMM, no rhinorrhea  Neck: Supple, no elevated JVP, no carotid bruits  Lungs: Clear to auscultation bilaterally. No wheezes, rales, or rhonchi.   Cardiac: Regular rate and rhythm, +S1S2, no murmurs apparent  Abdomen: Soft, nontender, +bowel sounds  Extremities: Moves all extremities x 4, no lower extremity edema right more than left  Neurologic: No focal motor deficits apparent, normal mood and affect, alert and oriented x 3  Peripheral Pulses: Intact posterior tibial pulses bilaterally    Laboratory Tests:  Lab Results   Component Value Date    CREATININE 1.0 09/07/2022    BUN 15 09/07/2022     09/07/2022    K 4.5 09/07/2022    CL 99 09/07/2022    CO2 25 09/07/2022     No results found for: MG  Lab Results   Component Value Date    WBC 8.1 09/07/2022    HGB 13.3 09/07/2022    HCT 40.1 09/07/2022    MCV 93.7 09/07/2022     09/07/2022     Lab Results   Component Value Date    ALT 13 09/07/2022    AST 20 09/07/2022    ALKPHOS 99 09/07/2022    BILITOT 0.6 09/07/2022     Lab Results   Component Value Date    CKTOTAL 83 03/05/2021    CKMB 1.4 02/19/2020    TROPONINI <0.01 02/20/2020    TROPONINI <0.01 02/19/2020    TROPONINI <0.01 02/08/2014     Lab Results   Component Value Date    INR 1.0 08/10/2018    INR 1.0 02/07/2014    PROTIME 11.4 08/10/2018    PROTIME 11.8 02/07/2014     Lab Results   Component Value Date    TSH 0.154 (L) 09/07/2022     Lab Results   Component Value Date    LABA1C 5.9 (H) 03/05/2021     No results found for: EAG  Lab Results   Component Value Date    CHOL 145 09/07/2022    CHOL 138 02/14/2022    CHOL 146 05/05/2020     Lab Results   Component Value Date    TRIG 131 09/07/2022    TRIG 167 (H) 02/14/2022    TRIG 108 05/05/2020     Lab Results   Component Value Date    HDL 56 09/07/2022    HDL 53 02/14/2022    HDL 63 05/05/2020     Lab Results   Component Value Date    LDLCALC 63 09/07/2022    LDLCALC 52 02/14/2022    LDLCALC 61 05/05/2020     Lab Results   Component Value Date    LABVLDL 26 09/07/2022    LABVLDL 33 02/14/2022    LABVLDL 22 05/05/2020     No results found for: CHOLHDLRATIO    Cardiac Tests:  ECG: normal sinus rhythm, first-degree AV block,  normal EKG. No changes    Echocardiogram: 8/15/2011-LVEF 21%, stage I diastolic dysfunction, mild TR.    TTE-2/20/2020:  Normal left ventricle size and systolic function. Ejection fraction is visually estimated at 60-65%. No regional wall motion abnormalities seen. Normal left ventricle wall thickness. Normal diastolic function. Normal right ventricular size and function. TAPSE 18 mm. No hemodynamically significant aortic stenosis is present.    No significant valvular abnormalities. RVSP is 20 mmHg. Normal estimated PA systolic pressure. No evidence for hemodynamically significant pericardial effusion. No previous echo for comparison    Stress test:  2/8/2014-no reversible ischemia, no defect or infarction. LVEF 77%. Lexiscan nuclear stress test-2/20/2020: LVEF 89%, no reversible perfusion defect, normal wall motion. Lexiscan nuclear stress test-8/31/2021  Impression:    ECG during the infusion did not change. The myocardial perfusion imaging was normal.    Overall left ventricular systolic function was normal without regional wall motion abnormalities. Low risk general pharmacologic stress test.      The ASCVD Risk score (Venus SPENCER, et al., 2019) failed to calculate for the following reasons: The 2019 ASCVD risk score is only valid for ages 36 to 78    Lipid panel done on 4/30/2019: Total cholesterol 164, LDL 88, HDL 61, triglycerides 74, renal functions were normal with a creatinine of 0.8 and CBC was normal.    ASSESSMENT:  Palpitations most likely secondary to premature atrial complexes well controlled on Toprol  H/o Atypical chest pain, stress test showed no evidence of ischemia based on stress test in August 2021. Now with non-anginal chest pain, no ischemic work-up is planned at this time. Hyperlipidemia stable on statin, LDL is well controlled at goal level. Hypertension, well controlled. History of TIA  Hypothyroidism on hormone replacement therapy  Peripheral edema right more than left, resolved. Allergic aspirin. Plan:   Noncardiac chest pain no further ischemic work-up is planned at this time  Continue metoprolol 50 mg twice a day, Imdur 30 mg po daily  Continue  losartan hydrochlorothiazide to losartan/HCTZ 100/12.5 mg 1 p.o. daily for hypertension. Continue atorvastatin 20 mg p.o. daily.   Continue regular exercise   She was advised to continue using compression stockings  Lipid panel done in September 2022 was reviewed LDL 63, triglycerides 131 cholesterol 145 HDL 56, levels are well controlled  Follow up with me in 12 months. The patient's current medication list, allergies, problem list and results of all previously ordered testing were reviewed at today's visit.   Vinod Hercules MD  [facility]) Cardiology

## 2023-02-14 LAB
ANION GAP SERPL CALCULATED.3IONS-SCNC: 8 MMOL/L (ref 7–16)
BASOPHILS ABSOLUTE: 0.05 E9/L (ref 0–0.2)
BASOPHILS RELATIVE PERCENT: 0.6 % (ref 0–2)
BUN BLDV-MCNC: 15 MG/DL (ref 6–23)
CALCIUM SERPL-MCNC: 9.9 MG/DL (ref 8.6–10.2)
CHLORIDE BLD-SCNC: 97 MMOL/L (ref 98–107)
CO2: 30 MMOL/L (ref 22–29)
CREAT SERPL-MCNC: 0.9 MG/DL (ref 0.5–1)
EOSINOPHILS ABSOLUTE: 0.17 E9/L (ref 0.05–0.5)
EOSINOPHILS RELATIVE PERCENT: 2.1 % (ref 0–6)
GFR SERPL CREATININE-BSD FRML MDRD: >60 ML/MIN/1.73
GLUCOSE BLD-MCNC: 98 MG/DL (ref 74–99)
HCT VFR BLD CALC: 40 % (ref 34–48)
HEMOGLOBIN: 13.4 G/DL (ref 11.5–15.5)
IMMATURE GRANULOCYTES #: 0.05 E9/L
IMMATURE GRANULOCYTES %: 0.6 % (ref 0–5)
LYMPHOCYTES ABSOLUTE: 1.97 E9/L (ref 1.5–4)
LYMPHOCYTES RELATIVE PERCENT: 23.8 % (ref 20–42)
MCH RBC QN AUTO: 30.1 PG (ref 26–35)
MCHC RBC AUTO-ENTMCNC: 33.5 % (ref 32–34.5)
MCV RBC AUTO: 89.9 FL (ref 80–99.9)
MONOCYTES ABSOLUTE: 0.73 E9/L (ref 0.1–0.95)
MONOCYTES RELATIVE PERCENT: 8.8 % (ref 2–12)
NEUTROPHILS ABSOLUTE: 5.29 E9/L (ref 1.8–7.3)
NEUTROPHILS RELATIVE PERCENT: 64.1 % (ref 43–80)
PDW BLD-RTO: 14.1 FL (ref 11.5–15)
PLATELET # BLD: 266 E9/L (ref 130–450)
PMV BLD AUTO: 10.6 FL (ref 7–12)
POTASSIUM SERPL-SCNC: 4.5 MMOL/L (ref 3.5–5)
RBC # BLD: 4.45 E12/L (ref 3.5–5.5)
SODIUM BLD-SCNC: 135 MMOL/L (ref 132–146)
WBC # BLD: 8.3 E9/L (ref 4.5–11.5)

## 2023-02-15 ENCOUNTER — OFFICE VISIT (OUTPATIENT)
Dept: PAIN MANAGEMENT | Age: 88
End: 2023-02-15
Payer: MEDICARE

## 2023-02-15 VITALS
DIASTOLIC BLOOD PRESSURE: 60 MMHG | SYSTOLIC BLOOD PRESSURE: 134 MMHG | HEIGHT: 64 IN | TEMPERATURE: 96.6 F | WEIGHT: 177 LBS | BODY MASS INDEX: 30.22 KG/M2

## 2023-02-15 DIAGNOSIS — M47.816 LUMBAR FACET ARTHROPATHY: ICD-10-CM

## 2023-02-15 DIAGNOSIS — M79.2 NEURALGIA AND NEURITIS: ICD-10-CM

## 2023-02-15 DIAGNOSIS — M47.816 LUMBAR SPONDYLOSIS: ICD-10-CM

## 2023-02-15 DIAGNOSIS — G89.4 CHRONIC PAIN SYNDROME: Primary | ICD-10-CM

## 2023-02-15 DIAGNOSIS — M51.9 LUMBAR DISC DISORDER: ICD-10-CM

## 2023-02-15 DIAGNOSIS — M48.061 SPINAL STENOSIS OF LUMBAR REGION WITHOUT NEUROGENIC CLAUDICATION: ICD-10-CM

## 2023-02-15 LAB
ALBUMIN SERPL-MCNC: 2.3 G/DL (ref 3.5–4.7)
ALPHA-1-GLOBULIN: 0.3 G/DL (ref 0.2–0.4)
ALPHA-2-GLOBULIN: 1.3 G/DL (ref 0.5–1)
BETA GLOBULIN: 1.2 G/DL (ref 0.8–1.3)
ELECTROPHORESIS: ABNORMAL
GAMMA GLOBULIN: 1.1 G/DL (ref 0.7–1.6)
IGA: 111 MG/DL (ref 70–400)
IGG: 844 MG/DL (ref 700–1600)
IGM: 96 MG/DL (ref 40–230)
IMMUNOFIXATION RESULT, SERUM: NORMAL
TOTAL PROTEIN: 6.4 G/DL (ref 6.4–8.3)

## 2023-02-15 PROCEDURE — 99213 OFFICE O/P EST LOW 20 MIN: CPT | Performed by: PAIN MEDICINE

## 2023-02-15 RX ORDER — GABAPENTIN 300 MG/1
300 CAPSULE ORAL 3 TIMES DAILY
Qty: 270 CAPSULE | Refills: 1 | Status: SHIPPED | OUTPATIENT
Start: 2023-02-15 | End: 2023-05-16

## 2023-02-15 NOTE — PROGRESS NOTES
[address]  [address] [facility], [address] [place] [address] [place]  [phone]    Follow up Note      Agatha Hagen     Date of Visit:  2/15/2023    CC:  Patient presents for follow up   Chief Complaint   Patient presents with    Back Pain     HPI:    Pain is tolerable and managed. Appropriate analgesia with current medications regimen: yes - . Change in quality of symptoms:none  Medication side effects:none. Recent diagnostic testing:none. Recent interventional procedures:none    She has not been on anticoagulation medications to include none. The patient  has not been on herbal supplements. The patient is not diabetic. Imaging:   Lumbar spine MRI   Multilevel lumbar spondylosis most pronounced at L3-4, there is diffuse disc bulge with facet hypertrophy resulting in moderate central canal and bilateral foraminal narrowing. Mild right foraminal narrowing at L2-3 and mild bilateral foraminal narrowing at L4-5 and L5-S1. 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 The study also reveals evidence of chronic neurogenic changes in the right S1 myotome indicative of a right chronic S1 radiculopathy. Previous treatments: Physical Therapy and medications. .         Potential Aberrant Drug-Related Behavior:  No      Urine Drug Screening:  None    OARRS report:  02/2023 consistent    Opioid Agreement:  Renewal date:N/A    Past Medical History:   Diagnosis Date    Abdominal pain     Arthritis     l knee injections with dr Bridget Gillis    Chest pain     resolved    Difficult intubation 1-    document on chart    Hypertension     Hypothyroidism     Low back pain     Migraine headache     Neuropathy     sensory in feet     Osteopenia Palpitation     resolved    Plantar fasciitis     Skipped heart beats     Tingling of both feet     Tinnitus     hissing bilateral     Past Surgical History:   Procedure Laterality Date    BLADDER SURGERY      mesh    BREAST RECONSTRUCTION Bilateral 2004, 2007    COLONOSCOPY  10/13/2020    DILATION AND CURETTAGE OF UTERUS      EYE SURGERY Bilateral     cataract removal    INCONTINENCE SURGERY      MASTECTOMY Bilateral 1982    for fibrocystic breast disease; has saline implants Ok to take B/P either arm    PAIN MANAGEMENT PROCEDURE Right 04/12/2021    LUMBAR EPIDURAL STEROID INJECTION L5-S1 RIGHT PARAMEDIAN WITH 80 DEPO **ALLERGIC TO IV DYE** performed by Elisabet Hadley MD at [address] RF 1ST VEIN Right 08/10/2018    RIGHT GREASTER SAPHENOUS VEIN STAB PHLEBECTOMIES performed by Mahnaz Velasco MD at [address] [place] [address]       Prior to Admission medications    Medication Sig Start Date End Date Taking? Authorizing Provider   atorvastatin (LIPITOR) 20 MG tablet Take 1 tablet by mouth daily 1/20/23  Yes Keke Moore MD   isosorbide mononitrate (IMDUR) 30 MG extended release tablet Take 1 tablet by mouth daily 1/20/23  Yes Keke Moore MD   losartan-hydroCHLOROthiazide [facility]) 100-12.5 MG per tablet Take 1 tablet by mouth daily 1/20/23  Yes Keke Moore MD   metoprolol succinate (TOPROL XL) 50 MG extended release tablet Take 1 tablet by mouth 2 times daily 1/20/23  Yes Keke Moore MD   estradiol (ESTRACE) 0.1 MG/GM vaginal cream  8/12/22  Yes Historical Provider, MD   gabapentin (NEURONTIN) 400 MG capsule Take 1 capsule by mouth in the morning and 1 capsule at noon and 1 capsule before bedtime. Do all this for 90 days.  8/16/22 2/15/23 Yes Elisabet Hadley MD   psyllium (KONSYL) 28.3 % PACK Take 1 packet by mouth as needed for Constipation   Yes Historical Provider, MD   Vibegron ([place]) 75 MG TABS Take by mouth daily   Yes Historical Provider, MD   Ca & Phos-Vit D-Mag [phone] TABS Take 1 tablet by mouth 2 times daily    Yes Historical Provider, MD   Multiple Vitamins-Minerals (THERAPEUTIC MULTIVITAMIN-MINERALS) tablet Take 1 tablet by mouth daily   Yes Historical Provider, MD   vitamin D (CHOLECALCIFEROL) 1000 UNIT TABS tablet Take 1,000 Int'l Units by mouth daily Takes two tabs daily   Yes Historical Provider, MD   levothyroxine (SYNTHROID) 75 MCG tablet Take 75 mcg by mouth Daily    Yes Historical Provider, MD   amitriptyline (ELAVIL) 25 MG tablet Take 25 mg by mouth nightly    Yes Historical Provider, MD     Allergies   Allergen Reactions    Iv Dye [Iodides] Hives    Pcn [Penicillins] Rash    Norvasc [Amlodipine] Swelling    Aspirin Other (See Comments)     tingling    Bactrim Other (See Comments)     Yeast infection    Drixoral [Dexbrompheniramine-Pseudoeph] Other (See Comments)     hyper    Morphine Other (See Comments)     Iv line vein reddened     Social History     Socioeconomic History    Marital status:      Spouse name: Not on file    Number of children: Not on file    Years of education: Not on file    Highest education level: Not on file   Occupational History    Not on file   Tobacco Use    Smoking status: Never    Smokeless tobacco: Never   Vaping Use    Vaping Use: Never used   Substance and Sexual Activity    Alcohol use: No    Drug use: No    Sexual activity: Not Currently     Partners: Male   Other Topics Concern    Not on file   Social History Narrative    Not on file     Social Determinants of Health     Financial Resource Strain: Not on file   Food Insecurity: Not on file   Transportation Needs: Not on file   Physical Activity: Not on file   Stress: Not on file   Social Connections: Not on file   Intimate Partner Violence: Not on file   Housing Stability: Not on file     Family History   Problem Relation Age of Onset    Kidney Disease Mother     Atrial Fibrillation Father     Coronary Art Dis Maternal Grandfather     Cancer Paternal Uncle         unknown     REVIEW OF SYSTEMS:     Elisha Read denies fever/chills, chest pain, shortness of breath, new bowel or bladder complaints. All other review of systems was negative. PHYSICAL EXAMINATION:      /60   Temp (!) 96.6 °F (35.9 °C) (Infrared)   Ht 5' 4\" (1.626 m)   Wt 177 lb (80.3 kg)   BMI 30.38 kg/m²     General:       General appearance:   elderly, pleasant and well-hydrated. , in mild discomfort and A & O x3  Build:Overweight     HEENT:     Head:normocephalic and atraumatic  Sclera: icterus absent,      Lungs:     Breathing:Breathing Pattern: regular, no distress     Abdomen:     Shape:non-distended and normal  Tenderness:none     Lumbar spine:     Spine inspection:scoliosis   CVA tenderness:No   Palpation:tenderness paravertebral muscles, facet loading, left, right, positive and tenderness. Range of motion:not tested. Musculoskeletal:     Trigger points in Paraveteral:absent bilaterally  SI joint tenderness:negative right, negative left  Trochanteric bursa tenderness:positive right, positive left  SLR:negative right, negative left, sitting      Extremities:     Tremors:None bilaterally upper and lower  Range of motion: pain with internal rotation of hips negative. Intact:Yes  Edema:+1 pitting bilaterally     Neurological:     Sensory:normal to light touch bilateral lower extremities  Motor:                       Right Quadriceps5/5          Left Quadriceps5/5           Right Gastrocnemius5/5    Left Gastrocnemius5/5  Right Ant Tibialis5/5  Left Ant Tibialis5/5  Reflexes:    Right Quadriceps reflex2+  Left Quadriceps reflex2+  Right Achilles reflex2+  Left Achilles reflex2+  Gait:normal     Dermatology:     Skin:no unusual rashes and no skin lesions    Exam unchanged    Impression:  Bilateral leg pain. Failed LESI. Plan:  Follow up on her bilateral leg pain with no acute issues, managed with current regimen.   Continue with Gabapentin but will change to 300 mg TID  OARRS report reviewed 02/2023. Patient encouraged to stay active and to lose weight. Treatment plan discussed with the patient including medications side effects. We discussed with the patient that combining opioids, benzodiazepines, alcohol, illicit drugs or sleep aids increases the risk of respiratory depression including death. We discussed that these medications may cause drowsiness, sedation or dizziness and have counseled the patient not to drive or operate machinery. We have discussed that these medications will be prescribed only by one provider. We have discussed with the patient about age related risk factors and have thoroughly discussed the importance of taking these medications as prescribed. The patient verbalizes understanding. jacqui Flaherty M.D.

## 2023-02-15 NOTE — PROGRESS NOTES
Do you currently have any of the following:    Fever: No  Headache:  No  Cough: No  Shortness of breath: No  Exposed to anyone with these symptoms: No                                                                                                                Iman Harrison presents to the [address] [address] on 2/15/2023. Tori Gonzalez is complaining of pain in back and feet. The pain is constant. The pain is described as numb. Pain is rated on her best day at a 3, on her worst day at a 5, and on average at a 3 on the VAS scale. She took her last dose of Neurontin today. Tori Gonzalez does not have issues with constipation. Any procedures since your last visit: No.    She is not on NSAIDS and  is not on anticoagulation medications to include none and is managed by none. Pacemaker or defibrillator: No.    Medication Contract and Consent for Opioid Use Documents Filed        No documents found                       There were no vitals taken for this visit. No LMP recorded.  Patient is postmenopausal.

## 2023-02-16 LAB — BETA-2 MICROGLOBULIN: 3.2 MG/L (ref 0.6–2.4)

## 2023-02-17 LAB
KAPPA FREE LIGHT CHAINS QNT: 26.06 MG/L (ref 3.3–19.4)
KAPPA/LAMBDA FREE LIGHT CHAIN RATIO: 1.86 (ref 0.26–1.65)
LAMBDA FREE LIGHT CHAINS QNT: 13.99 MG/L (ref 5.71–26.3)

## 2023-02-22 ENCOUNTER — HOSPITAL ENCOUNTER (OUTPATIENT)
Dept: INFUSION THERAPY | Age: 88
Discharge: HOME | End: 2023-02-22

## 2023-02-22 ENCOUNTER — OFFICE VISIT (OUTPATIENT)
Dept: ONCOLOGY | Age: 88
End: 2023-02-22
Payer: MEDICARE

## 2023-02-22 VITALS
BODY MASS INDEX: 30.59 KG/M2 | SYSTOLIC BLOOD PRESSURE: 145 MMHG | TEMPERATURE: 97.3 F | WEIGHT: 179.2 LBS | OXYGEN SATURATION: 100 % | HEART RATE: 58 BPM | HEIGHT: 64 IN | DIASTOLIC BLOOD PRESSURE: 56 MMHG

## 2023-02-22 DIAGNOSIS — D47.2 MONOCLONAL GAMMOPATHY: Primary | ICD-10-CM

## 2023-02-22 PROCEDURE — 1123F ACP DISCUSS/DSCN MKR DOCD: CPT | Performed by: INTERNAL MEDICINE

## 2023-02-22 PROCEDURE — 99214 OFFICE O/P EST MOD 30 MIN: CPT | Performed by: INTERNAL MEDICINE

## 2023-02-22 PROCEDURE — 99212 OFFICE O/P EST SF 10 MIN: CPT

## 2023-02-22 NOTE — PROGRESS NOTES
[address] [facility] MEDICAL ONCOLOGY  [address] [place] [id number]  Dept: [phone]  Loc: [phone]  Attending Progress Note      Reason for Visit:   Monoclonal gammopathy. Referring Physician:  Aidan Donahue MD    PCP:  Mariola Teixeira MD    History of Present Illness: The patient is a very pleasant 82-year-old lady, with a past medical history significant for hypertension, hypothyroidism, migraine headache, she is a retired RN, she was seen by neurology for peripheral neuropathy, she had a work-up done, including SPEP with BERE, revealing minor/faint band/minute amount of IgG kappa monoclonal protein, M spike 0.4G/DL. The patient has pain from peripheral neuropathy, overall stable. Also pain in the lateral aspect of the right thigh. Sometimes she has back pain when she bends over. No history of anemia, kidney disease or hypercalcemia. She is doing well. No new bony pain. She continues to follow with pain management, the Neurontin dose was decreased. Review of Systems;  CONSTITUTIONAL: No fever, chills. Fair appetite and energy level. ENMT: Eyes: No diplopia; Nose: No epistaxis. Mouth: No sore throat. RESPIRATORY: No hemoptysis, shortness of breath, cough. CARDIOVASCULAR: No chest pain, palpitations. GASTROINTESTINAL: No nausea/vomiting, abdominal pain, has constipation (controlled). GENITOURINARY: No dysuria, urinary frequency, hematuria. NEURO: No syncope, presyncope, pos for migraine headache, none recently. Peripheral neuropathy (tingling) in lower legs. Denies any lightheadedness.    Remainder:  ROS NEGATIVE    Past Medical History:      Diagnosis Date    Abdominal pain     Arthritis     l knee injections with dr Cedrick Ko    Chest pain     resolved    Difficult intubation 1-    document on chart    Hypertension     Hypothyroidism     Low back pain     Migraine headache     Neuropathy     sensory in feet Osteopenia     Palpitation     resolved    Plantar fasciitis     Skipped heart beats     Tingling of both feet     Tinnitus     hissing bilateral     Patient Active Problem List   Diagnosis    Chest pain    Hyperlipidemia    Migraine headache    Plantar fasciitis    Osteopenia    Varicose veins of both lower extremities    Venous insufficiency of both lower extremities    Hypothyroidism    Left arm pain    Neuralgia and neuritis    Carpal tunnel syndrome of right wrist    Lumbar radiculopathy    Type 2 diabetes mellitus without complication, without long-term current use of insulin (HCC)    Numbness and tingling    Chronic pain syndrome    Lumbar facet arthropathy    Lumbar disc disorder    Spinal stenosis of lumbar region without neurogenic claudication        Past Surgical History:      Procedure Laterality Date    BLADDER SURGERY      mesh    BREAST RECONSTRUCTION Bilateral 2004, 2007    COLONOSCOPY  10/13/2020    DILATION AND CURETTAGE OF UTERUS      EYE SURGERY Bilateral     cataract removal    INCONTINENCE SURGERY      MASTECTOMY Bilateral 1982    for fibrocystic breast disease; has saline implants Ok to take B/P either arm    PAIN MANAGEMENT PROCEDURE Right 04/12/2021    LUMBAR EPIDURAL STEROID INJECTION L5-S1 RIGHT PARAMEDIAN WITH 80 DEPO **ALLERGIC TO IV DYE** performed by Mesfin Blackmon MD at [facility] RF 1ST VEIN Right 08/10/2018    RIGHT GREASTER SAPHENOUS VEIN STAB PHLEBECTOMIES performed by Rosa Al MD at [facility]         Family History:  Family History   Problem Relation Age of Onset    Kidney Disease Mother     Atrial Fibrillation Father     Coronary Art Dis Maternal Grandfather     Cancer Paternal Uncle         unknown       Medications:  Reviewed and reconciled.     Social History:  Social History     Socioeconomic History    Marital status:      Spouse name: Not on file    Number of children: Not on file    Years of education: Not on file    Highest education level: Not on file   Occupational History    Not on file   Tobacco Use    Smoking status: Never    Smokeless tobacco: Never   Vaping Use    Vaping Use: Never used   Substance and Sexual Activity    Alcohol use: No    Drug use: No    Sexual activity: Not Currently     Partners: Male   Other Topics Concern    Not on file   Social History Narrative    Not on file     Social Determinants of Health     Financial Resource Strain: Not on file   Food Insecurity: Not on file   Transportation Needs: Not on file   Physical Activity: Not on file   Stress: Not on file   Social Connections: Not on file   Intimate Partner Violence: Not on file   Housing Stability: Not on file       Allergies: Allergies   Allergen Reactions    Iv Dye [Iodides] Hives    Pcn [Penicillins] Rash    Norvasc [Amlodipine] Swelling    Aspirin Other (See Comments)     tingling    Bactrim Other (See Comments)     Yeast infection    Drixoral [Dexbrompheniramine-Pseudoeph] Other (See Comments)     hyper    Morphine Other (See Comments)     Iv line vein reddened       Physical Exam:  BP (!) 145/56   Pulse 58   Temp 97.3 °F (36.3 °C)   Ht 5' 4\" (1.626 m)   Wt 179 lb 3.2 oz (81.3 kg)   SpO2 100%   BMI 30.76 kg/m²     GENERAL: Alert, oriented x 3, not in acute distress. HEENT: PERRLA; EOMI. Oropharynx clear. NECK: Supple. No palpable cervical or supraclavicular lymphadenopathy. LUNGS: Good air entry bilaterally. No wheezing, crackles or rhonchi. CARDIOVASCULAR: Regular rate. No murmurs, rubs or gallops. ABDOMEN: Soft. Non-tender, non-distended. Positive bowel sounds. EXTREMITIES: Without clubbing, cyanosis, or edema. Gets inj into left knee for OA. NEUROLOGIC: No focal deficits. Currently no lower back pain or hip pain.    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 IgA 118, IgG 779, IgM 106. Patient with IgG kappa monoclonal gammopathy, the patient does not have anemia, hypercalcemia or kidney dysfunction, a work-up was ordered, the M spike is small, 0.3G/DL, IgA 131, IgG 907, IgM 114. Kappa one 8.76, lambda one 2.34, kappa to lambda ratio 1.52, beta-2 microglobulin is 2.2, a skeletal survey was done on 3/30/2021, revealing osteopenia, no focal lytic lesions, small sclerotic lesion in the right iliac bone, likely a bone island. The results were reviewed with the patient. 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 On 2/14/2023 she had repeat SPEP with immunofixation, she has an IgG kappa monoclonal protein, M spike is stable at 0.8G/DL, kappa had slightly increased to 26.06, lambda 13.99, kappa to lambda ratio 1.86, quantitative immunoglobins are all within normal range, normal hemoglobin, hematocrit creatinine and calcium. There is no evidence of progression to multiple myeloma. She had a skeletal survey done on 5/24/2022, revealing stable sclerotic focus associated with the right iliac wing, previously this was felt to be a bony island, new very small 4 mm lucency associated with the parietal region of the lateral view of the calvarium, could represent a small lytic lesion, recommended to continue with close follow-up as her labs did not change. She will have SPEP, immunofixation, serum light chain assay, quantitative immunoglobulins and beta-2 microglobulin done in 3 months.   Will discuss having a bone marrow biopsy and aspirate done if she has any concerning findings on her labs or skeletal surveys, I will order repeat skeletal survey when she returns for her next visit. RTC in 3 months, with blood work 1 week prior. Thank you for allowing us to participate in the care of Mrs. Hui Rudolph.     Mana Richards MD   HEMATOLOGY/MEDICAL [address] MEDICAL ONCOLOGY  [address] [place], [place] [id number]  Dept: [phone]: [phone]

## 2023-05-17 DIAGNOSIS — D47.2 MONOCLONAL GAMMOPATHY: ICD-10-CM

## 2023-05-17 LAB
ALBUMIN SERPL-MCNC: 4 G/DL (ref 3.5–5.2)
ALP SERPL-CCNC: 92 U/L (ref 35–104)
ALT SERPL-CCNC: 13 U/L (ref 0–32)
ANION GAP SERPL CALCULATED.3IONS-SCNC: 10 MMOL/L (ref 7–16)
AST SERPL-CCNC: 20 U/L (ref 0–31)
BASOPHILS # BLD: 0.04 E9/L (ref 0–0.2)
BASOPHILS NFR BLD: 0.6 % (ref 0–2)
BILIRUB SERPL-MCNC: 0.6 MG/DL (ref 0–1.2)
BUN SERPL-MCNC: 13 MG/DL (ref 6–23)
CALCIUM SERPL-MCNC: 9.9 MG/DL (ref 8.6–10.2)
CHLORIDE SERPL-SCNC: 97 MMOL/L (ref 98–107)
CO2 SERPL-SCNC: 26 MMOL/L (ref 22–29)
CREAT SERPL-MCNC: 1 MG/DL (ref 0.5–1)
EOSINOPHIL # BLD: 0.17 E9/L (ref 0.05–0.5)
EOSINOPHIL NFR BLD: 2.7 % (ref 0–6)
ERYTHROCYTE [DISTWIDTH] IN BLOOD BY AUTOMATED COUNT: 13.2 FL (ref 11.5–15)
GLUCOSE SERPL-MCNC: 113 MG/DL (ref 74–99)
HCT VFR BLD AUTO: 40.5 % (ref 34–48)
HGB BLD-MCNC: 13 G/DL (ref 11.5–15.5)
IMM GRANULOCYTES # BLD: 0.02 E9/L
IMM GRANULOCYTES NFR BLD: 0.3 % (ref 0–5)
LYMPHOCYTES # BLD: 1.83 E9/L (ref 1.5–4)
LYMPHOCYTES NFR BLD: 29.1 % (ref 20–42)
MCH RBC QN AUTO: 30.2 PG (ref 26–35)
MCHC RBC AUTO-ENTMCNC: 32.1 % (ref 32–34.5)
MCV RBC AUTO: 94.2 FL (ref 80–99.9)
MONOCYTES # BLD: 0.67 E9/L (ref 0.1–0.95)
MONOCYTES NFR BLD: 10.7 % (ref 2–12)
NEUTROPHILS # BLD: 3.55 E9/L (ref 1.8–7.3)
NEUTS SEG NFR BLD: 56.6 % (ref 43–80)
PLATELET # BLD AUTO: 273 E9/L (ref 130–450)
PMV BLD AUTO: 10.7 FL (ref 7–12)
POTASSIUM SERPL-SCNC: 4 MMOL/L (ref 3.5–5)
PROT SERPL-MCNC: 6.7 G/DL (ref 6.4–8.3)
RBC # BLD AUTO: 4.3 E12/L (ref 3.5–5.5)
SODIUM SERPL-SCNC: 133 MMOL/L (ref 132–146)
WBC # BLD: 6.3 E9/L (ref 4.5–11.5)

## 2023-05-19 LAB
ALBUMIN SERPL-MCNC: 3.3 G/DL (ref 3.5–4.7)
ALPHA1 GLOB SERPL ELPH-MCNC: 0.2 G/DL (ref 0.2–0.4)
ALPHA2 GLOB SERPL ELPH-MCNC: 1 G/DL (ref 0.5–1)
B-GLOBULIN SERPL ELPH-MCNC: 0.8 G/DL (ref 0.8–1.3)
ELECTROPHORESIS: ABNORMAL
GAMMA GLOB SERPL ELPH-MCNC: 0.9 G/DL (ref 0.7–1.6)
IGA SERPL-MCNC: 97 MG/DL (ref 70–400)
IGG SERPL-MCNC: 739 MG/DL (ref 700–1600)
IGM SERPL-MCNC: 94 MG/DL (ref 40–230)
IMMUNOFIXATION RESULT, SERUM: NORMAL

## 2023-05-20 LAB
DEPRECATED KAPPA LC FREE/LAMBDA SER: 1.85 {RATIO} (ref 0.26–1.65)
KAPPA LC FREE SER-MCNC: 30.01 MG/L (ref 3.3–19.4)
LAMBDA LC FREE SERPL-MCNC: 16.26 MG/L (ref 5.71–26.3)

## 2023-05-22 LAB — B2 MICROGLOB SERPL IA-MCNC: 3.1 MG/L (ref 0.6–2.4)

## 2023-05-24 ENCOUNTER — OFFICE VISIT (OUTPATIENT)
Dept: ONCOLOGY | Age: 88
End: 2023-05-24
Payer: MEDICARE

## 2023-05-24 ENCOUNTER — HOSPITAL ENCOUNTER (OUTPATIENT)
Dept: INFUSION THERAPY | Age: 88
Discharge: HOME | End: 2023-05-24

## 2023-05-24 VITALS
SYSTOLIC BLOOD PRESSURE: 161 MMHG | BODY MASS INDEX: 30.87 KG/M2 | OXYGEN SATURATION: 100 % | TEMPERATURE: 97.2 F | WEIGHT: 180.8 LBS | DIASTOLIC BLOOD PRESSURE: 53 MMHG | HEART RATE: 63 BPM | HEIGHT: 64 IN

## 2023-05-24 DIAGNOSIS — D47.2 MONOCLONAL GAMMOPATHY: Primary | ICD-10-CM

## 2023-05-24 PROCEDURE — 99214 OFFICE O/P EST MOD 30 MIN: CPT | Performed by: INTERNAL MEDICINE

## 2023-05-24 PROCEDURE — 99212 OFFICE O/P EST SF 10 MIN: CPT

## 2023-05-24 PROCEDURE — 1123F ACP DISCUSS/DSCN MKR DOCD: CPT | Performed by: INTERNAL MEDICINE

## 2023-05-24 NOTE — PROGRESS NOTES
[address] [facility] MEDICAL ONCOLOGY  [address] [place] [address]  Dept: [phone]  Loc: [phone]  Attending Progress Note      Reason for Visit:   Monoclonal gammopathy. 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 The patient has pain from peripheral neuropathy, overall stable. Also pain in the lateral aspect of the right thigh. Sometimes she has back pain when she bends over. No history of anemia, kidney disease or hypercalcemia. The patient has chronic back pain, no new bony pain, she has peripheral neuropathy, she follows with pain management. Review of Systems;  CONSTITUTIONAL: No fever, chills. Fair appetite and energy level. ENMT: Eyes: No diplopia; Nose: No epistaxis. Mouth: No sore throat. RESPIRATORY: No hemoptysis, shortness of breath, cough. CARDIOVASCULAR: No chest pain, palpitations. GASTROINTESTINAL: No nausea/vomiting, she had right-sided abdominal pain, was seen in the ED on 4/10/2023, had CT scan of the abdomen the pelvis done, revealing cholelithiasis and renal cyst, she is following with surgery. GENITOURINARY: No dysuria, urinary frequency, hematuria. NEURO: No syncope, presyncope, pos for migraine headache, none recently. Peripheral neuropathy (tingling) in lower legs. Denies any lightheadedness.    Remainder:  ROS NEGATIVE    Past Medical History:      Diagnosis Date    Abdominal pain     Arthritis     l knee injections with dr Dionte Sanchez    Chest pain     resolved    Difficult intubation

## 2023-08-16 ENCOUNTER — OFFICE VISIT (OUTPATIENT)
Dept: PAIN MANAGEMENT | Age: 88
End: 2023-08-16
Payer: MEDICARE

## 2023-08-16 VITALS
HEART RATE: 75 BPM | RESPIRATION RATE: 18 BRPM | SYSTOLIC BLOOD PRESSURE: 124 MMHG | OXYGEN SATURATION: 91 % | HEIGHT: 64 IN | DIASTOLIC BLOOD PRESSURE: 64 MMHG | TEMPERATURE: 96.8 F | BODY MASS INDEX: 29.88 KG/M2 | WEIGHT: 175 LBS

## 2023-08-16 DIAGNOSIS — G89.4 CHRONIC PAIN SYNDROME: Primary | ICD-10-CM

## 2023-08-16 PROCEDURE — 99213 OFFICE O/P EST LOW 20 MIN: CPT | Performed by: PAIN MEDICINE

## 2023-08-16 PROCEDURE — 1123F ACP DISCUSS/DSCN MKR DOCD: CPT | Performed by: PAIN MEDICINE

## 2023-08-16 RX ORDER — GABAPENTIN 300 MG/1
300 CAPSULE ORAL 3 TIMES DAILY
Qty: 270 CAPSULE | Refills: 1 | Status: SHIPPED | OUTPATIENT
Start: 2023-08-16 | End: 2024-02-12

## 2023-08-23 DIAGNOSIS — M54.16 LUMBAR RADICULOPATHY: Primary | ICD-10-CM

## 2023-08-23 DIAGNOSIS — M54.16 LUMBAR RADICULOPATHY: ICD-10-CM

## 2023-08-23 LAB
ABSOLUTE IMMATURE GRANULOCYTE: 0.08 K/UL (ref 0–0.58)
ALBUMIN SERPL-MCNC: 4.4 G/DL (ref 3.5–5.2)
ALP BLD-CCNC: 84 U/L (ref 35–104)
ALT SERPL-CCNC: 13 U/L (ref 0–32)
ANION GAP SERPL CALCULATED.3IONS-SCNC: 19 MMOL/L (ref 7–16)
AST SERPL-CCNC: 18 U/L (ref 0–31)
BASOPHILS ABSOLUTE: 0.02 K/UL (ref 0–0.2)
BASOPHILS RELATIVE PERCENT: 0 % (ref 0–2)
BILIRUB SERPL-MCNC: 0.5 MG/DL (ref 0–1.2)
BUN BLDV-MCNC: 23 MG/DL (ref 6–23)
CALCIUM SERPL-MCNC: 9.4 MG/DL (ref 8.6–10.2)
CHLORIDE BLD-SCNC: 96 MMOL/L (ref 98–107)
CO2: 19 MMOL/L (ref 22–29)
CREAT SERPL-MCNC: 1 MG/DL (ref 0.5–1)
EOSINOPHILS ABSOLUTE: 0 K/UL (ref 0.05–0.5)
EOSINOPHILS RELATIVE PERCENT: 0 % (ref 0–6)
GFR SERPL CREATININE-BSD FRML MDRD: 58 ML/MIN/1.73M2
GLUCOSE BLD-MCNC: 93 MG/DL (ref 74–99)
HCT VFR BLD CALC: 38.8 % (ref 34–48)
HEMOGLOBIN: 12.6 G/DL (ref 11.5–15.5)
IMMATURE GRANULOCYTES: 1 % (ref 0–5)
LACTATE DEHYDROGENASE: 163 U/L (ref 135–214)
LYMPHOCYTES ABSOLUTE: 1.15 K/UL (ref 1.5–4)
LYMPHOCYTES RELATIVE PERCENT: 7 % (ref 20–42)
MCH RBC QN AUTO: 30 PG (ref 26–35)
MCHC RBC AUTO-ENTMCNC: 32.5 G/DL (ref 32–34.5)
MCV RBC AUTO: 92.4 FL (ref 80–99.9)
MONOCYTES ABSOLUTE: 0.63 K/UL (ref 0.1–0.95)
MONOCYTES RELATIVE PERCENT: 4 % (ref 2–12)
NEUTROPHILS ABSOLUTE: 14.51 K/UL (ref 1.8–7.3)
NEUTROPHILS RELATIVE PERCENT: 89 % (ref 43–80)
PDW BLD-RTO: 14 % (ref 11.5–15)
PLATELET # BLD: 281 K/UL (ref 130–450)
PMV BLD AUTO: 10.8 FL (ref 7–12)
POTASSIUM SERPL-SCNC: 4.5 MMOL/L (ref 3.5–5)
RBC # BLD: 4.2 M/UL (ref 3.5–5.5)
SODIUM BLD-SCNC: 134 MMOL/L (ref 132–146)
TOTAL PROTEIN: 6.9 G/DL (ref 6.4–8.3)
WBC # BLD: 16.4 K/UL (ref 4.5–11.5)

## 2023-08-24 LAB
IGA: 97 MG/DL (ref 70–400)
IGG: 805 MG/DL (ref 700–1600)
IGM: 85 MG/DL (ref 40–230)

## 2023-08-25 LAB
ALBUMIN (CALCULATED): 3 G/DL (ref 3.5–4.7)
ALPHA-1-GLOBULIN: 0.3 G/DL (ref 0.2–0.4)
ALPHA-2-GLOBULIN: 1.2 G/DL (ref 0.5–1)
BETA GLOBULIN: 1.1 G/DL (ref 0.8–1.3)
BETA-2 MICROGLOBULIN: 2.3 MG/L (ref 0.6–2.4)
FREE KAPPA/LAMBDA RATIO: 1.3 (ref 0.26–1.65)
GAMMA GLOBULIN: 1.1 G/DL (ref 0.7–1.6)
KAPPA FREE LIGHT CHAINS QNT: 15.7 MG/L (ref 3.7–19.4)
LAMBDA FREE LIGHT CHAINS QNT: 12.1 MG/L (ref 5.7–26.3)
M SPIKE 1 SERUM PROTEIN ELEC: 0.8 G/DL
PATHOLOGIST REVIEW: NORMAL
PATHOLOGIST: ABNORMAL
PROTEIN ELECTROPHORESIS, SERUM: ABNORMAL
SERUM IFX INTERP: NORMAL
TOTAL PROTEIN: 6.7 G/DL (ref 6.4–8.3)

## 2023-08-30 ENCOUNTER — HOSPITAL ENCOUNTER (OUTPATIENT)
Dept: INFUSION THERAPY | Age: 88
Discharge: HOME | End: 2023-08-30

## 2023-08-30 ENCOUNTER — OFFICE VISIT (OUTPATIENT)
Dept: ONCOLOGY | Age: 88
End: 2023-08-30
Payer: MEDICARE

## 2023-08-30 VITALS
HEART RATE: 58 BPM | WEIGHT: 173.4 LBS | TEMPERATURE: 97.4 F | OXYGEN SATURATION: 96 % | DIASTOLIC BLOOD PRESSURE: 52 MMHG | SYSTOLIC BLOOD PRESSURE: 132 MMHG | HEIGHT: 64 IN | BODY MASS INDEX: 29.6 KG/M2

## 2023-08-30 DIAGNOSIS — D47.2 MONOCLONAL GAMMOPATHY: Primary | ICD-10-CM

## 2023-08-30 PROCEDURE — 99214 OFFICE O/P EST MOD 30 MIN: CPT | Performed by: INTERNAL MEDICINE

## 2023-08-30 PROCEDURE — 1123F ACP DISCUSS/DSCN MKR DOCD: CPT | Performed by: INTERNAL MEDICINE

## 2023-08-30 PROCEDURE — 99213 OFFICE O/P EST LOW 20 MIN: CPT

## 2023-08-30 NOTE — PROGRESS NOTES
Patient provided with discharge instructions. All questions answered. Patient understands follow up plan of care.
edema. Gets inj into left knee for OA. NEUROLOGIC: No focal deficits. Currently no lower back pain or hip pain. 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 IgA 118, IgG 779, IgM 106. Patient with IgG kappa monoclonal gammopathy, the patient does not have anemia, hypercalcemia or kidney dysfunction, a work-up was ordered, the M spike is small, 0.3G/DL, IgA 131, IgG 907, IgM 114. Kappa one 8.76, lambda one 2.34, kappa to lambda ratio 1.52, beta-2 microglobulin is 2.2, a skeletal survey was done on 3/30/2021, revealing osteopenia, no focal lytic lesions, small sclerotic lesion in the right iliac bone, likely a bone island. The results were reviewed with the patient. 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 Labs reviewed, hemoglobin is normal at 12.6, calcium is normal, creatinine 1, SPEP with immunofixation had revealed an IgG kappa monoclonal protein, M spike 0.6G/DL, stable, normal quantitative immunoglobulins, kappa is 30.01, Had normalized 15.7, lambda normal 12.1, kappa to lambda ratio 1.3, IgG level is normal, there is no evidence of progression to multiple myeloma.   She had a skeletal survey done on 5/24/2022, revealing stable sclerotic focus associated with the right iliac wing, previously this was felt to be a bony island, new very small 4 mm lucency associated with the parietal region of the lateral view of the calvarium, could represent a small lytic lesion, recommended to continue with close follow-up as her labs did

## 2023-10-11 ENCOUNTER — HOSPITAL ENCOUNTER (OUTPATIENT)
Dept: GENERAL RADIOLOGY | Age: 88
Discharge: HOME | End: 2023-10-13
Payer: MEDICARE

## 2023-10-11 ENCOUNTER — HOSPITAL ENCOUNTER (OUTPATIENT)
Age: 88
Discharge: HOME | End: 2023-10-13
Payer: MEDICARE

## 2023-10-11 DIAGNOSIS — D47.2 MONOCLONAL GAMMOPATHY: ICD-10-CM

## 2023-10-11 PROCEDURE — 77075 RADEX OSSEOUS SURVEY COMPL: CPT

## 2023-12-06 DIAGNOSIS — M54.16 LUMBAR RADICULOPATHY: ICD-10-CM

## 2023-12-06 LAB
ABSOLUTE IMMATURE GRANULOCYTE: <0.03 K/UL (ref 0–0.58)
ALBUMIN SERPL-MCNC: 4.1 G/DL (ref 3.5–5.2)
ALP BLD-CCNC: 94 U/L (ref 35–104)
ALT SERPL-CCNC: 13 U/L (ref 0–32)
ANION GAP SERPL CALCULATED.3IONS-SCNC: 18 MMOL/L (ref 7–16)
AST SERPL-CCNC: 22 U/L (ref 0–31)
BASOPHILS ABSOLUTE: 0.06 K/UL (ref 0–0.2)
BASOPHILS RELATIVE PERCENT: 1 % (ref 0–2)
BILIRUB SERPL-MCNC: 0.6 MG/DL (ref 0–1.2)
BUN BLDV-MCNC: 21 MG/DL (ref 6–23)
CALCIUM SERPL-MCNC: 9.6 MG/DL (ref 8.6–10.2)
CHLORIDE BLD-SCNC: 100 MMOL/L (ref 98–107)
CO2: 21 MMOL/L (ref 22–29)
CREAT SERPL-MCNC: 0.9 MG/DL (ref 0.5–1)
EOSINOPHILS ABSOLUTE: 0.19 K/UL (ref 0.05–0.5)
EOSINOPHILS RELATIVE PERCENT: 3 % (ref 0–6)
GFR SERPL CREATININE-BSD FRML MDRD: >60 ML/MIN/1.73M2
GLUCOSE BLD-MCNC: 100 MG/DL (ref 74–99)
HCT VFR BLD CALC: 38.5 % (ref 34–48)
HEMOGLOBIN: 12.7 G/DL (ref 11.5–15.5)
IMMATURE GRANULOCYTES: 0 % (ref 0–5)
LACTATE DEHYDROGENASE: 163 U/L (ref 135–214)
LYMPHOCYTES ABSOLUTE: 2.43 K/UL (ref 1.5–4)
LYMPHOCYTES RELATIVE PERCENT: 36 % (ref 20–42)
MCH RBC QN AUTO: 30.8 PG (ref 26–35)
MCHC RBC AUTO-ENTMCNC: 33 G/DL (ref 32–34.5)
MCV RBC AUTO: 93.2 FL (ref 80–99.9)
MONOCYTES ABSOLUTE: 0.61 K/UL (ref 0.1–0.95)
MONOCYTES RELATIVE PERCENT: 9 % (ref 2–12)
NEUTROPHILS ABSOLUTE: 3.54 K/UL (ref 1.8–7.3)
NEUTROPHILS RELATIVE PERCENT: 52 % (ref 43–80)
PDW BLD-RTO: 13.3 % (ref 11.5–15)
PLATELET # BLD: 227 K/UL (ref 130–450)
PMV BLD AUTO: 11.4 FL (ref 7–12)
POTASSIUM SERPL-SCNC: 4.2 MMOL/L (ref 3.5–5)
RBC # BLD: 4.13 M/UL (ref 3.5–5.5)
SODIUM BLD-SCNC: 139 MMOL/L (ref 132–146)
TOTAL PROTEIN: 7 G/DL (ref 6.4–8.3)
WBC # BLD: 6.9 K/UL (ref 4.5–11.5)

## 2023-12-07 LAB
IGA: 96 MG/DL (ref 70–400)
IGG: 852 MG/DL (ref 700–1600)
IGM: 95 MG/DL (ref 40–230)

## 2023-12-08 LAB
ALBUMIN (CALCULATED): 3.2 G/DL (ref 3.5–4.7)
ALPHA-1-GLOBULIN: 0.3 G/DL (ref 0.2–0.4)
ALPHA-2-GLOBULIN: 1.1 G/DL (ref 0.5–1)
BETA GLOBULIN: 1 G/DL (ref 0.8–1.3)
FREE KAPPA/LAMBDA RATIO: 1.77 (ref 0.26–1.65)
GAMMA GLOBULIN: 1.1 G/DL (ref 0.7–1.6)
KAPPA FREE LIGHT CHAINS QNT: 24.8 MG/L (ref 3.7–19.4)
LAMBDA FREE LIGHT CHAINS QNT: 14 MG/L (ref 5.7–26.3)
M SPIKE 1 SERUM PROTEIN ELEC: 0.8 G/DL
PATHOLOGIST REVIEW: NORMAL
PATHOLOGIST: ABNORMAL
PROTEIN ELECTROPHORESIS, SERUM: ABNORMAL
SERUM IFX INTERP: NORMAL
TOTAL PROTEIN: 6.6 G/DL (ref 6.4–8.3)

## 2023-12-12 LAB — BETA-2 MICROGLOBULIN: 3.3 MG/L

## 2023-12-13 ENCOUNTER — OFFICE VISIT (OUTPATIENT)
Dept: ONCOLOGY | Age: 88
End: 2023-12-13
Payer: MEDICARE

## 2023-12-13 ENCOUNTER — HOSPITAL ENCOUNTER (OUTPATIENT)
Dept: INFUSION THERAPY | Age: 88
Discharge: HOME | End: 2023-12-13

## 2023-12-13 VITALS
BODY MASS INDEX: 30.29 KG/M2 | WEIGHT: 177.4 LBS | OXYGEN SATURATION: 99 % | DIASTOLIC BLOOD PRESSURE: 50 MMHG | SYSTOLIC BLOOD PRESSURE: 141 MMHG | TEMPERATURE: 97.7 F | HEIGHT: 64 IN | HEART RATE: 63 BPM

## 2023-12-13 DIAGNOSIS — D47.2 MONOCLONAL GAMMOPATHY: Primary | ICD-10-CM

## 2023-12-13 PROCEDURE — 99214 OFFICE O/P EST MOD 30 MIN: CPT | Performed by: INTERNAL MEDICINE

## 2023-12-13 PROCEDURE — 99212 OFFICE O/P EST SF 10 MIN: CPT

## 2023-12-13 PROCEDURE — 1123F ACP DISCUSS/DSCN MKR DOCD: CPT | Performed by: INTERNAL MEDICINE

## 2023-12-13 NOTE — PROGRESS NOTES
[address] [place] [address] MEDICAL ONCOLOGY  [address]  [facility] [address]  Dept: [phone]  Loc: [phone]  Attending Progress Note      Reason for Visit:   Monoclonal gammopathy. 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 The patient has pain from peripheral neuropathy, overall stable. Also pain in the lateral aspect of the right thigh. Sometimes she has back pain when she bends over. No history of anemia, kidney disease or hypercalcemia. The patient has chronic back pain, no new bony pain, she has peripheral neuropathy, the patient did not benefit from the epidurals. The patient has right plantar fasciitis. Review of Systems;  CONSTITUTIONAL: No fever, chills. Fair appetite and energy level. ENMT: Eyes: No diplopia; Nose: No epistaxis. Mouth: No sore throat. RESPIRATORY: No hemoptysis, shortness of breath, cough. CARDIOVASCULAR: No chest pain, palpitations. GASTROINTESTINAL: No nausea/vomiting, she had right-sided abdominal pain, was seen in the ED on 4/10/2023, had CT scan of the abdomen the pelvis done, revealing cholelithiasis and renal cyst, she is following with surgery. GENITOURINARY: No dysuria, urinary frequency, hematuria. NEURO: No syncope, presyncope, pos for migraine headache, none recently. Peripheral neuropathy (tingling) in lower legs. Denies any lightheadedness.    Remainder:  ROS NEGATIVE    Past Medical History:      Diagnosis Date    Abdominal pain     Arthritis     l knee injections with

## 2024-01-03 ENCOUNTER — TELEPHONE (OUTPATIENT)
Dept: ADMINISTRATIVE | Age: 89
End: 2024-01-03

## 2024-01-03 NOTE — TELEPHONE ENCOUNTER
Pt called for appt w/Dr Malcolm for cardiac clearance; Dr Castrejon  was to have sent over a fax request 2 wks ago; knee surgery 2/1/24.  Please advise if request received, advise scheduling [phone]

## 2024-01-03 NOTE — TELEPHONE ENCOUNTER
Spoke with patient.  No request has been received for cardiac clearance.  F/U scheduled for  1/4/24 at 7:40 a.m.

## 2024-01-04 ENCOUNTER — OFFICE VISIT (OUTPATIENT)
Dept: CARDIOLOGY CLINIC | Age: 89
End: 2024-01-04
Payer: MEDICARE

## 2024-01-04 VITALS
HEIGHT: 65 IN | WEIGHT: 177.8 LBS | SYSTOLIC BLOOD PRESSURE: 148 MMHG | BODY MASS INDEX: 29.62 KG/M2 | DIASTOLIC BLOOD PRESSURE: 68 MMHG | RESPIRATION RATE: 18 BRPM | HEART RATE: 61 BPM

## 2024-01-04 DIAGNOSIS — E78.5 HYPERLIPIDEMIA, UNSPECIFIED HYPERLIPIDEMIA TYPE: Primary | ICD-10-CM

## 2024-01-04 PROCEDURE — 99214 OFFICE O/P EST MOD 30 MIN: CPT | Performed by: INTERNAL MEDICINE

## 2024-01-04 PROCEDURE — 1123F ACP DISCUSS/DSCN MKR DOCD: CPT | Performed by: INTERNAL MEDICINE

## 2024-01-04 PROCEDURE — 93000 ELECTROCARDIOGRAM COMPLETE: CPT | Performed by: INTERNAL MEDICINE

## 2024-01-04 RX ORDER — METOPROLOL SUCCINATE 50 MG/1
50 TABLET, EXTENDED RELEASE ORAL 2 TIMES DAILY
Qty: 180 TABLET | Refills: 3 | Status: SHIPPED | OUTPATIENT
Start: 2024-01-04

## 2024-01-04 RX ORDER — ISOSORBIDE MONONITRATE 30 MG/1
30 TABLET, EXTENDED RELEASE ORAL DAILY
Qty: 90 TABLET | Refills: 3 | Status: SHIPPED | OUTPATIENT
Start: 2024-01-04

## 2024-01-04 RX ORDER — LOSARTAN POTASSIUM AND HYDROCHLOROTHIAZIDE 12.5; 1 MG/1; MG/1
1 TABLET ORAL DAILY
Qty: 90 TABLET | Refills: 3 | Status: SHIPPED | OUTPATIENT
Start: 2024-01-04

## 2024-01-04 RX ORDER — ATORVASTATIN CALCIUM 20 MG/1
20 TABLET, FILM COATED ORAL DAILY
Qty: 90 TABLET | Refills: 3 | Status: SHIPPED | OUTPATIENT
Start: 2024-01-04

## 2024-01-04 NOTE — PATIENT INSTRUCTIONS
Patient's revised cardiac risk index is low (0), class I risk, 0.4% risk of major perioperative cardiac event.  Currently patient denies any active cardiac symptoms including chest pain, decompensated heart failure, uncontrolled arrhythmias or obstructive valve lesions.  Patient is stable from the cardiology standpoint and proceed with knee surgery as scheduled and no further cardiac workup is needed.  Continue metoprolol perioperatively.  Continue metoprolol succinate 50 mg twice a day, Imdur 30 mg po daily  Continue  losartan hydrochlorothiazide to losartan/HCTZ 100/12.5 mg 1 p.o. daily for hypertension.  Advised to follow-up with her primary care provider as schedule for adding Norvasc if the blood pressure remains elevated.  Continue atorvastatin 20 mg p.o. daily.  Continue regular exercise   She was advised to continue using compression stockings  Obtain latest lipid panel results from her primary care provider.  Follow up with me in 12 months.

## 2024-01-04 NOTE — PROGRESS NOTES
wall thickness.   Normal diastolic function.   Normal right ventricular size and function. TAPSE 18 mm.   No hemodynamically significant aortic stenosis is present.   No significant valvular abnormalities.   RVSP is 20 mmHg. Normal estimated PA systolic pressure.   No evidence for hemodynamically significant pericardial effusion.   No previous echo for comparison    Stress test:  2/8/2014-no reversible ischemia, no defect or infarction. LVEF 77%.    Lexiscan nuclear stress test-2/20/2020: LVEF 89%, no reversible perfusion defect, normal wall motion.    Lexiscan nuclear stress test-8/31/2021  Impression:    ECG during the infusion did not change.   The myocardial perfusion imaging was normal.    Overall left ventricular systolic function was normal without regional wall motion abnormalities.  Low risk general pharmacologic stress test.      The ASCVD Risk score (Venus DK, et al., 2019) failed to calculate for the following reasons:    The 2019 ASCVD risk score is only valid for ages 40 to 79    Lipid panel done on 4/30/2019: Total cholesterol 164, LDL 88, HDL 61, triglycerides 74, renal functions were normal with a creatinine of 0.8 and CBC was normal.    ASSESSMENT:  Preop cardiac evaluation prior to left knee arthroplasty  Palpitations most likely secondary to premature atrial complexes well controlled on Toprol  H/o Atypical chest pain, resolved.  Stress test in 2021 showed no evidence of ischemia based on stress test in August 2021.  Now with non-anginal chest pain, no ischemic work-up is planned at this time.  Hyperlipidemia stable on statin, LDL is well controlled at goal level.  Hypertension, slightly elevated.  History of TIA  Hypothyroidism on hormone replacement therapy  Peripheral edema right more than left, resolved.   Allergic aspirin.       Plan:   Patient's revised cardiac risk index is low (0), class I risk, 0.4% risk of major perioperative cardiac event.  Currently patient denies any active cardiac

## 2024-01-05 ENCOUNTER — TELEPHONE (OUTPATIENT)
Dept: CARDIOLOGY CLINIC | Age: 89
End: 2024-01-05

## 2024-01-05 DIAGNOSIS — E78.5 HYPERLIPIDEMIA, UNSPECIFIED HYPERLIPIDEMIA TYPE: ICD-10-CM

## 2024-01-05 LAB
CHOLESTEROL: 129 MG/DL
HDLC SERPL-MCNC: 63 MG/DL
LDL CHOLESTEROL: 40 MG/DL
TRIGL SERPL-MCNC: 130 MG/DL
VLDLC SERPL CALC-MCNC: 26 MG/DL

## 2024-01-05 NOTE — TELEPHONE ENCOUNTER
----- Message from Antony Malcolm MD sent at 1/5/2024  2:51 PM EST -----  Chol is well controlled and stay on the current dose of statin

## 2024-01-23 ENCOUNTER — HOSPITAL ENCOUNTER (OUTPATIENT)
Age: 89
Discharge: HOME | End: 2024-01-25
Payer: MEDICARE

## 2024-01-23 LAB
ABO + RH BLD: NORMAL
ARM BAND NUMBER: NORMAL
BLOOD BANK SAMPLE EXPIRATION: NORMAL
BLOOD GROUP ANTIBODIES SERPL: NEGATIVE

## 2024-01-23 PROCEDURE — 86850 RBC ANTIBODY SCREEN: CPT

## 2024-01-23 PROCEDURE — 86901 BLOOD TYPING SEROLOGIC RH(D): CPT

## 2024-01-23 PROCEDURE — 86900 BLOOD TYPING SEROLOGIC ABO: CPT

## 2024-01-30 ENCOUNTER — OFFICE VISIT (OUTPATIENT)
Dept: PAIN MANAGEMENT | Age: 89
End: 2024-01-30
Payer: MEDICARE

## 2024-01-30 VITALS
WEIGHT: 177 LBS | SYSTOLIC BLOOD PRESSURE: 140 MMHG | BODY MASS INDEX: 29.49 KG/M2 | OXYGEN SATURATION: 97 % | HEART RATE: 70 BPM | RESPIRATION RATE: 18 BRPM | DIASTOLIC BLOOD PRESSURE: 72 MMHG | HEIGHT: 65 IN | TEMPERATURE: 96.9 F

## 2024-01-30 DIAGNOSIS — G89.4 CHRONIC PAIN SYNDROME: Primary | ICD-10-CM

## 2024-01-30 DIAGNOSIS — M51.9 LUMBAR DISC DISORDER: ICD-10-CM

## 2024-01-30 DIAGNOSIS — M79.2 NEURALGIA AND NEURITIS: ICD-10-CM

## 2024-01-30 DIAGNOSIS — M47.816 LUMBAR SPONDYLOSIS: ICD-10-CM

## 2024-01-30 DIAGNOSIS — M47.816 LUMBAR FACET ARTHROPATHY: ICD-10-CM

## 2024-01-30 DIAGNOSIS — M48.061 SPINAL STENOSIS OF LUMBAR REGION WITHOUT NEUROGENIC CLAUDICATION: ICD-10-CM

## 2024-01-30 PROCEDURE — 99213 OFFICE O/P EST LOW 20 MIN: CPT | Performed by: PAIN MEDICINE

## 2024-01-30 PROCEDURE — 1123F ACP DISCUSS/DSCN MKR DOCD: CPT | Performed by: PAIN MEDICINE

## 2024-01-30 RX ORDER — CLOTRIMAZOLE AND BETAMETHASONE DIPROPIONATE 10; .64 MG/G; MG/G
CREAM TOPICAL
COMMUNITY
Start: 2024-01-29

## 2024-01-30 RX ORDER — GABAPENTIN 300 MG/1
300 CAPSULE ORAL 3 TIMES DAILY
Qty: 270 CAPSULE | Refills: 0 | Status: SHIPPED | OUTPATIENT
Start: 2024-01-30 | End: 2024-04-29

## 2024-01-30 RX ORDER — DOXYCYCLINE HYCLATE 100 MG
TABLET ORAL
COMMUNITY
Start: 2024-01-26

## 2024-01-30 RX ORDER — GABAPENTIN 300 MG/1
300 CAPSULE ORAL 3 TIMES DAILY
Qty: 90 CAPSULE | Refills: 1 | Status: SHIPPED
Start: 2024-01-30 | End: 2024-01-30

## 2024-01-30 NOTE — PROGRESS NOTES
Ama Urias presents to the [facility] on 1/30/2024. Ama is complaining of pain back. The pain is intermittent. The pain is described as aching, throbbing, stabbing, sharp, and penetrating. Pain is rated on her best day at a 3, on her worst day at a 10, and on average at a 7 on the VAS scale. She took her last dose of  E xtra Strength Tylenol , Tylenol is infrequent.      Any procedures since your last visit: No  She is not on NSAIDS and  is not on anticoagulation medications to include none   Pacemaker or defibrillator: No   Medication Contract and Consent for Opioid Use Documents Filed        No documents found                       Temp 96.9 °F (36.1 °C) (Infrared)   Resp 18   Ht 1.651 m (5' 5\")   Wt 80.3 kg (177 lb)   BMI 29.45 kg/m²      No LMP recorded. Patient is postmenopausal.    
Low back pain     Migraine headache     Neuropathy     sensory in feet     Osteopenia     Palpitation     resolved    Plantar fasciitis     Skipped heart beats     Tingling of both feet     Tinnitus     hissing bilateral     Past Surgical History:   Procedure Laterality Date    BLADDER SURGERY      mesh    BREAST RECONSTRUCTION Bilateral 2004, 2007    COLONOSCOPY  10/13/2020    DILATION AND CURETTAGE OF UTERUS      EYE SURGERY Bilateral     cataract removal    INCONTINENCE SURGERY      MASTECTOMY Bilateral 1982    for fibrocystic breast disease; has saline implants Ok to take B/P either arm    PAIN MANAGEMENT PROCEDURE Right 04/12/2021    LUMBAR EPIDURAL STEROID INJECTION L5-S1 RIGHT PARAMEDIAN WITH 80 DEPO **ALLERGIC TO IV DYE** performed by Jose Moore MD at [facility] OR    MS ENDOVEN ABLTJ INCMPTNT VEIN XTR RF 1ST VEIN Right 08/10/2018    RIGHT GREASTER SAPHENOUS VEIN STAB PHLEBECTOMIES performed by Yan Porras MD at [facility] OR    TONSILLECTOMY       Prior to Admission medications    Medication Sig Start Date End Date Taking? Authorizing Provider   doxycycline hyclate (VIBRA-TABS) 100 MG tablet  1/26/24  Yes Latosha Guzman MD   clotrimazole-betamethasone (LOTRISONE) 1-0.05 % cream  1/29/24  Yes Latosha Guzman MD   atorvastatin (LIPITOR) 20 MG tablet Take 1 tablet by mouth daily 1/4/24   Antony Malcolm MD   isosorbide mononitrate (IMDUR) 30 MG extended release tablet Take 1 tablet by mouth daily 1/4/24   Antony Malcolm MD   losartan-hydroCHLOROthiazide (HYZAAR) 100-12.5 MG per tablet Take 1 tablet by mouth daily 1/4/24   Antony Malcolm MD   metoprolol succinate (TOPROL XL) 50 MG extended release tablet Take 1 tablet by mouth 2 times daily 1/4/24   Antony Malcolm MD   gabapentin (NEURONTIN) 300 MG capsule Take 1 capsule by mouth 3 times daily for 180 days. 8/16/23 2/12/24  Jose Moore MD   estradiol (ESTRACE) 0.1 MG/GM vaginal cream  8/12/22   ProviderLatosha MD

## 2024-02-02 ENCOUNTER — HOSPITAL ENCOUNTER (OUTPATIENT)
Age: 89
Discharge: HOME | End: 2024-02-04

## 2024-02-02 LAB
ANION GAP SERPL CALCULATED.3IONS-SCNC: 11 MMOL/L (ref 7–16)
ANION GAP SERPL CALCULATED.3IONS-SCNC: 13 MMOL/L (ref 7–16)
BUN SERPL-MCNC: 22 MG/DL (ref 6–23)
BUN SERPL-MCNC: 27 MG/DL (ref 6–23)
CALCIUM SERPL-MCNC: 9 MG/DL (ref 8.6–10.2)
CALCIUM SERPL-MCNC: 9.1 MG/DL (ref 8.6–10.2)
CHLORIDE SERPL-SCNC: 91 MMOL/L (ref 98–107)
CHLORIDE SERPL-SCNC: 92 MMOL/L (ref 98–107)
CO2 SERPL-SCNC: 23 MMOL/L (ref 22–29)
CO2 SERPL-SCNC: 26 MMOL/L (ref 22–29)
CREAT SERPL-MCNC: 0.8 MG/DL (ref 0.5–1)
CREAT SERPL-MCNC: 0.9 MG/DL (ref 0.5–1)
ERYTHROCYTE [DISTWIDTH] IN BLOOD BY AUTOMATED COUNT: 13 % (ref 11.5–15)
GFR SERPL CREATININE-BSD FRML MDRD: 60 ML/MIN/1.73M2
GFR SERPL CREATININE-BSD FRML MDRD: >60 ML/MIN/1.73M2
GLUCOSE SERPL-MCNC: 190 MG/DL (ref 74–99)
GLUCOSE SERPL-MCNC: 214 MG/DL (ref 74–99)
HCT VFR BLD AUTO: 36.6 % (ref 34–48)
HGB BLD-MCNC: 12.3 G/DL (ref 11.5–15.5)
MCH RBC QN AUTO: 30.1 PG (ref 26–35)
MCHC RBC AUTO-ENTMCNC: 33.6 G/DL (ref 32–34.5)
MCV RBC AUTO: 89.5 FL (ref 80–99.9)
PLATELET # BLD AUTO: 238 K/UL (ref 130–450)
PMV BLD AUTO: 10.9 FL (ref 7–12)
POTASSIUM SERPL-SCNC: 3.8 MMOL/L (ref 3.5–5)
POTASSIUM SERPL-SCNC: 4.3 MMOL/L (ref 3.5–5)
RBC # BLD AUTO: 4.09 M/UL (ref 3.5–5.5)
SODIUM SERPL-SCNC: 128 MMOL/L (ref 132–146)
SODIUM SERPL-SCNC: 128 MMOL/L (ref 132–146)
WBC OTHER # BLD: 17 K/UL (ref 4.5–11.5)

## 2024-02-02 PROCEDURE — 85027 COMPLETE CBC AUTOMATED: CPT

## 2024-02-02 PROCEDURE — 80048 BASIC METABOLIC PNL TOTAL CA: CPT

## 2024-02-03 ENCOUNTER — HOSPITAL ENCOUNTER (OUTPATIENT)
Age: 89
Discharge: HOME | End: 2024-02-05

## 2024-02-03 LAB
ANION GAP SERPL CALCULATED.3IONS-SCNC: 11 MMOL/L (ref 7–16)
ANION GAP SERPL CALCULATED.3IONS-SCNC: 12 MMOL/L (ref 7–16)
BUN SERPL-MCNC: 37 MG/DL (ref 6–23)
BUN SERPL-MCNC: 37 MG/DL (ref 6–23)
CALCIUM SERPL-MCNC: 8.6 MG/DL (ref 8.6–10.2)
CALCIUM SERPL-MCNC: 9 MG/DL (ref 8.6–10.2)
CHLORIDE SERPL-SCNC: 92 MMOL/L (ref 98–107)
CHLORIDE SERPL-SCNC: 94 MMOL/L (ref 98–107)
CO2 SERPL-SCNC: 24 MMOL/L (ref 22–29)
CO2 SERPL-SCNC: 25 MMOL/L (ref 22–29)
CREAT SERPL-MCNC: 1.1 MG/DL (ref 0.5–1)
CREAT SERPL-MCNC: 1.1 MG/DL (ref 0.5–1)
ERYTHROCYTE [DISTWIDTH] IN BLOOD BY AUTOMATED COUNT: 13.4 % (ref 11.5–15)
GFR SERPL CREATININE-BSD FRML MDRD: 47 ML/MIN/1.73M2
GFR SERPL CREATININE-BSD FRML MDRD: 49 ML/MIN/1.73M2
GLUCOSE SERPL-MCNC: 100 MG/DL (ref 74–99)
GLUCOSE SERPL-MCNC: 165 MG/DL (ref 74–99)
HCT VFR BLD AUTO: 29.7 % (ref 34–48)
HGB BLD-MCNC: 9.9 G/DL (ref 11.5–15.5)
MCH RBC QN AUTO: 31.1 PG (ref 26–35)
MCHC RBC AUTO-ENTMCNC: 33.3 G/DL (ref 32–34.5)
MCV RBC AUTO: 93.4 FL (ref 80–99.9)
PLATELET # BLD AUTO: 203 K/UL (ref 130–450)
PMV BLD AUTO: 11.1 FL (ref 7–12)
POTASSIUM SERPL-SCNC: 3.7 MMOL/L (ref 3.5–5)
POTASSIUM SERPL-SCNC: 3.9 MMOL/L (ref 3.5–5)
RBC # BLD AUTO: 3.18 M/UL (ref 3.5–5.5)
SODIUM SERPL-SCNC: 127 MMOL/L (ref 132–146)
SODIUM SERPL-SCNC: 131 MMOL/L (ref 132–146)
WBC OTHER # BLD: 16.7 K/UL (ref 4.5–11.5)

## 2024-02-03 PROCEDURE — 85027 COMPLETE CBC AUTOMATED: CPT

## 2024-02-03 PROCEDURE — 80048 BASIC METABOLIC PNL TOTAL CA: CPT

## 2024-04-17 DIAGNOSIS — M54.16 LUMBAR RADICULOPATHY: ICD-10-CM

## 2024-04-17 LAB
ALBUMIN SERPL-MCNC: 4.1 G/DL (ref 3.5–5.2)
ALP SERPL-CCNC: 89 U/L (ref 35–104)
ALT SERPL-CCNC: 11 U/L (ref 0–32)
ANION GAP SERPL CALCULATED.3IONS-SCNC: 13 MMOL/L (ref 7–16)
AST SERPL-CCNC: 20 U/L (ref 0–31)
BASOPHILS # BLD: 0.05 K/UL (ref 0–0.2)
BASOPHILS NFR BLD: 1 % (ref 0–2)
BILIRUB SERPL-MCNC: 0.5 MG/DL (ref 0–1.2)
BUN SERPL-MCNC: 19 MG/DL (ref 6–23)
CALCIUM SERPL-MCNC: 9.8 MG/DL (ref 8.6–10.2)
CHLORIDE SERPL-SCNC: 98 MMOL/L (ref 98–107)
CHOLEST SERPL-MCNC: 122 MG/DL
CO2 SERPL-SCNC: 24 MMOL/L (ref 22–29)
CREAT SERPL-MCNC: 1 MG/DL (ref 0.5–1)
EOSINOPHIL # BLD: 0.19 K/UL (ref 0.05–0.5)
EOSINOPHILS RELATIVE PERCENT: 3 % (ref 0–6)
ERYTHROCYTE [DISTWIDTH] IN BLOOD BY AUTOMATED COUNT: 13.7 % (ref 11.5–15)
GFR SERPL CREATININE-BSD FRML MDRD: 56 ML/MIN/1.73M2
GLUCOSE SERPL-MCNC: 103 MG/DL (ref 74–99)
HCT VFR BLD AUTO: 37.9 % (ref 34–48)
HDLC SERPL-MCNC: 53 MG/DL
HGB BLD-MCNC: 11.9 G/DL (ref 11.5–15.5)
IMM GRANULOCYTES # BLD AUTO: <0.03 K/UL (ref 0–0.58)
IMM GRANULOCYTES NFR BLD: 0 % (ref 0–5)
IRON SERPL-MCNC: 59 UG/DL (ref 37–145)
LDLC SERPL CALC-MCNC: 44 MG/DL
LYMPHOCYTES NFR BLD: 2.38 K/UL (ref 1.5–4)
LYMPHOCYTES RELATIVE PERCENT: 34 % (ref 20–42)
MCH RBC QN AUTO: 30.8 PG (ref 26–35)
MCHC RBC AUTO-ENTMCNC: 31.4 G/DL (ref 32–34.5)
MCV RBC AUTO: 98.2 FL (ref 80–99.9)
MONOCYTES NFR BLD: 0.73 K/UL (ref 0.1–0.95)
MONOCYTES NFR BLD: 10 % (ref 2–12)
NEUTROPHILS NFR BLD: 53 % (ref 43–80)
NEUTS SEG NFR BLD: 3.73 K/UL (ref 1.8–7.3)
PLATELET # BLD AUTO: 248 K/UL (ref 130–450)
PMV BLD AUTO: 11.2 FL (ref 7–12)
POTASSIUM SERPL-SCNC: 4 MMOL/L (ref 3.5–5)
PROT SERPL-MCNC: 6.7 G/DL (ref 6.4–8.3)
RBC # BLD AUTO: 3.86 M/UL (ref 3.5–5.5)
SODIUM SERPL-SCNC: 135 MMOL/L (ref 132–146)
T4 FREE SERPL-MCNC: 1.4 NG/DL (ref 0.9–1.7)
TRIGL SERPL-MCNC: 126 MG/DL
TSH SERPL DL<=0.05 MIU/L-ACNC: 0.05 UIU/ML (ref 0.27–4.2)
URATE SERPL-MCNC: 5.6 MG/DL (ref 2.4–5.7)
VLDLC SERPL CALC-MCNC: 25 MG/DL
WBC OTHER # BLD: 7.1 K/UL (ref 4.5–11.5)

## 2024-04-18 LAB
25(OH)D3 SERPL-MCNC: 32.3 NG/ML (ref 30–100)
ALBUMIN SERPL-MCNC: 4.3 G/DL (ref 3.5–5.2)
ALP SERPL-CCNC: 90 U/L (ref 35–104)
ALT SERPL-CCNC: 13 U/L (ref 0–32)
ANION GAP SERPL CALCULATED.3IONS-SCNC: 15 MMOL/L (ref 7–16)
AST SERPL-CCNC: 19 U/L (ref 0–31)
BASOPHILS # BLD: 0.05 K/UL (ref 0–0.2)
BASOPHILS NFR BLD: 1 % (ref 0–2)
BILIRUB SERPL-MCNC: 0.6 MG/DL (ref 0–1.2)
BUN SERPL-MCNC: 20 MG/DL (ref 6–23)
CALCIUM SERPL-MCNC: 10 MG/DL (ref 8.6–10.2)
CHLORIDE SERPL-SCNC: 99 MMOL/L (ref 98–107)
CO2 SERPL-SCNC: 22 MMOL/L (ref 22–29)
CREAT SERPL-MCNC: 1 MG/DL (ref 0.5–1)
EOSINOPHIL # BLD: 0.18 K/UL (ref 0.05–0.5)
EOSINOPHILS RELATIVE PERCENT: 3 % (ref 0–6)
ERYTHROCYTE [DISTWIDTH] IN BLOOD BY AUTOMATED COUNT: 13.8 % (ref 11.5–15)
ERYTHROCYTE [SEDIMENTATION RATE] IN BLOOD BY PHOTOMETRIC METHOD: 15 MM/HR (ref 0–30)
GFR SERPL CREATININE-BSD FRML MDRD: 53 ML/MIN/1.73M2
GLUCOSE SERPL-MCNC: 101 MG/DL (ref 74–99)
HCT VFR BLD AUTO: 38.3 % (ref 34–48)
HGB BLD-MCNC: 12.5 G/DL (ref 11.5–15.5)
IGA SERPL-MCNC: 91 MG/DL (ref 70–400)
IGG SERPL-MCNC: 857 MG/DL (ref 700–1600)
IGM SERPL-MCNC: 80 MG/DL (ref 40–230)
IMM GRANULOCYTES # BLD AUTO: <0.03 K/UL (ref 0–0.58)
IMM GRANULOCYTES NFR BLD: 0 % (ref 0–5)
LDH SERPL-CCNC: 140 U/L (ref 135–214)
LYMPHOCYTES NFR BLD: 2.26 K/UL (ref 1.5–4)
LYMPHOCYTES RELATIVE PERCENT: 34 % (ref 20–42)
MCH RBC QN AUTO: 30.9 PG (ref 26–35)
MCHC RBC AUTO-ENTMCNC: 32.6 G/DL (ref 32–34.5)
MCV RBC AUTO: 94.8 FL (ref 80–99.9)
MONOCYTES NFR BLD: 0.67 K/UL (ref 0.1–0.95)
MONOCYTES NFR BLD: 10 % (ref 2–12)
NEUTROPHILS NFR BLD: 52 % (ref 43–80)
NEUTS SEG NFR BLD: 3.44 K/UL (ref 1.8–7.3)
PLATELET # BLD AUTO: 249 K/UL (ref 130–450)
PMV BLD AUTO: 11.4 FL (ref 7–12)
POTASSIUM SERPL-SCNC: 4.1 MMOL/L (ref 3.5–5)
PROT SERPL-MCNC: 6.6 G/DL (ref 6.4–8.3)
RBC # BLD AUTO: 4.04 M/UL (ref 3.5–5.5)
SODIUM SERPL-SCNC: 136 MMOL/L (ref 132–146)
WBC OTHER # BLD: 6.6 K/UL (ref 4.5–11.5)

## 2024-04-19 LAB
ALBUMIN SERPL-MCNC: 3.5 G/DL (ref 3.5–4.7)
ALPHA1 GLOB SERPL ELPH-MCNC: 0.2 G/DL (ref 0.2–0.4)
ALPHA2 GLOB SERPL ELPH-MCNC: 0.9 G/DL (ref 0.5–1)
B-GLOBULIN SERPL ELPH-MCNC: 0.8 G/DL (ref 0.8–1.3)
B2 MICROGLOB SERPL IA-MCNC: 3.4 MG/L
FREE KAPPA/LAMBDA RATIO: 2.03 (ref 0.22–1.74)
GAMMA GLOB SERPL ELPH-MCNC: 0.9 G/DL (ref 0.7–1.6)
INTERPRETATION SERPL IFE-IMP: NORMAL
KAPPA LC FREE SER-MCNC: 38 MG/L
LAMBDA LC FREE SERPL-MCNC: 18.7 MG/L (ref 4.2–27.7)
M PROTEIN SERPL ELPH-MCNC: 0.6 G/DL
PATH REV: NORMAL
PATHOLOGIST: NORMAL
PROT PATTERN SERPL ELPH-IMP: NORMAL
PROT SERPL-MCNC: 6.4 G/DL (ref 6.4–8.3)

## 2024-04-24 ENCOUNTER — OFFICE VISIT (OUTPATIENT)
Dept: ONCOLOGY | Age: 89
End: 2024-04-24
Payer: MEDICARE

## 2024-04-24 ENCOUNTER — HOSPITAL ENCOUNTER (OUTPATIENT)
Dept: INFUSION THERAPY | Age: 89
Discharge: HOME | End: 2024-04-24

## 2024-04-24 VITALS
WEIGHT: 172.8 LBS | DIASTOLIC BLOOD PRESSURE: 54 MMHG | HEART RATE: 62 BPM | BODY MASS INDEX: 28.79 KG/M2 | HEIGHT: 65 IN | SYSTOLIC BLOOD PRESSURE: 125 MMHG | OXYGEN SATURATION: 100 % | TEMPERATURE: 97.3 F

## 2024-04-24 DIAGNOSIS — D47.2 MONOCLONAL GAMMOPATHY: Primary | ICD-10-CM

## 2024-04-24 PROCEDURE — 99214 OFFICE O/P EST MOD 30 MIN: CPT | Performed by: INTERNAL MEDICINE

## 2024-04-24 PROCEDURE — 1123F ACP DISCUSS/DSCN MKR DOCD: CPT | Performed by: INTERNAL MEDICINE

## 2024-04-24 NOTE — PROGRESS NOTES
[facility] MEDICAL ONCOLOGY  [address] [facility] [address]  Dept: [phone]  Loc: [phone]  Attending Progress Note      Reason for Visit:   Monoclonal gammopathy.    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  The patient has pain from peripheral neuropathy, overall stable.  Also pain in the lateral aspect of the right thigh.  Sometimes she has back pain when she bends over.  No history of anemia, kidney disease or hypercalcemia.  The patient has chronic back pain, no new bony pain, she has peripheral neuropathy, the patient did not benefit from the epidurals.  She is doing well overall.      Review of Systems;  CONSTITUTIONAL: No fever, chills.  Fair appetite and energy level.  ENMT: Eyes: No diplopia; Nose: No epistaxis. Mouth: No sore throat.   RESPIRATORY: No hemoptysis, shortness of breath, cough.   CARDIOVASCULAR: No chest pain, palpitations.  GASTROINTESTINAL: No nausea/vomiting, she had right-sided abdominal pain, was seen in the ED on 4/10/2023, had CT scan of the abdomen the pelvis done, revealing cholelithiasis and renal cyst, she is following with surgery.  GENITOURINARY: No dysuria, urinary frequency, hematuria.  NEURO: No syncope, presyncope, pos for migraine headache, none recently. Peripheral neuropathy (tingling) in lower legs. Denies any lightheadedness.   Remainder:  ROS NEGATIVE    Past Medical History:      Diagnosis Date    Abdominal pain     Arthritis     l knee injections with dr hall    Chest

## 2024-04-30 ENCOUNTER — OFFICE VISIT (OUTPATIENT)
Dept: PAIN MANAGEMENT | Age: 89
End: 2024-04-30
Payer: MEDICARE

## 2024-04-30 VITALS
SYSTOLIC BLOOD PRESSURE: 140 MMHG | HEART RATE: 67 BPM | BODY MASS INDEX: 28.66 KG/M2 | OXYGEN SATURATION: 97 % | DIASTOLIC BLOOD PRESSURE: 60 MMHG | TEMPERATURE: 96.9 F | RESPIRATION RATE: 18 BRPM | WEIGHT: 172 LBS | HEIGHT: 65 IN

## 2024-04-30 DIAGNOSIS — M48.061 SPINAL STENOSIS OF LUMBAR REGION WITHOUT NEUROGENIC CLAUDICATION: ICD-10-CM

## 2024-04-30 DIAGNOSIS — M47.816 LUMBAR SPONDYLOSIS: ICD-10-CM

## 2024-04-30 DIAGNOSIS — M47.816 LUMBAR FACET ARTHROPATHY: ICD-10-CM

## 2024-04-30 DIAGNOSIS — G89.4 CHRONIC PAIN SYNDROME: Primary | ICD-10-CM

## 2024-04-30 DIAGNOSIS — M51.9 LUMBAR DISC DISORDER: ICD-10-CM

## 2024-04-30 PROCEDURE — 99213 OFFICE O/P EST LOW 20 MIN: CPT | Performed by: PAIN MEDICINE

## 2024-04-30 PROCEDURE — 1123F ACP DISCUSS/DSCN MKR DOCD: CPT | Performed by: PAIN MEDICINE

## 2024-04-30 RX ORDER — GABAPENTIN 300 MG/1
300 CAPSULE ORAL 3 TIMES DAILY
Qty: 270 CAPSULE | Refills: 0 | Status: SHIPPED | OUTPATIENT
Start: 2024-04-30 | End: 2024-07-29

## 2024-04-30 RX ORDER — ASPIRIN 81 MG/1
TABLET, COATED ORAL
COMMUNITY
Start: 2024-02-01

## 2024-04-30 RX ORDER — PANTOPRAZOLE SODIUM 40 MG/1
TABLET, DELAYED RELEASE ORAL
COMMUNITY
Start: 2024-04-11

## 2024-04-30 RX ORDER — HYDROCODONE BITARTRATE AND ACETAMINOPHEN 5; 325 MG/1; MG/1
TABLET ORAL
COMMUNITY
Start: 2024-02-02

## 2024-04-30 NOTE — PROGRESS NOTES
Ama Urisa presents to the [facility] on 4/30/2024. Ama is complaining of pain in her lower back and BLE. The pain is intermittent. The pain is described as aching  with numbness/tingling. Pain is rated on her best day at a 2, on her worst day at a 8, and on average at a 6 on the VAS scale. She took her last dose of Neurontin and Tylenol today.      Any procedures since your last visit: No    She is not on NSAIDS and  is not on anticoagulation medications to include none and is managed by NA       Pacemaker or defibrillator: No     Medication Contract and Consent for Opioid Use Documents Filed        No documents found                       Resp 18   Ht 1.651 m (5' 5\")   Wt 78 kg (172 lb)   BMI 28.62 kg/m²      No LMP recorded. Patient is postmenopausal.

## 2024-04-30 NOTE — PROGRESS NOTES
[facility]  [address], [place] [address]  [phone]    Follow up Note      Ama Urias     Date of Visit:  4/30/2024    CC:  Patient presents for follow up   Chief Complaint   Patient presents with    Back Pain    Leg Pain     HPI:  Follow up on her low back pain with no acute issues, last seen 01/2024.  Patient is s/p Left knee 02/2024  Appropriate analgesia with current medications regimen: yes - .    Change in quality of symptoms:none  Medication side effects:none.   Recent diagnostic testing:none.   Recent interventional procedures:none    She has not been on anticoagulation medications to include none. The patient  has not been on herbal supplements.  The patient is not diabetic.     Imaging:   Lumbar spine MRI   Multilevel lumbar spondylosis most pronounced at L3-4, there is diffuse disc bulge with facet hypertrophy resulting in moderate central canal and bilateral foraminal narrowing.  Mild right foraminal narrowing at L2-3 and mild bilateral foraminal narrowing at L4-5 and L5-S1.     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     The study also reveals evidence of chronic neurogenic changes in the right S1 myotome indicative of a right chronic S1 radiculopathy.     Previous treatments: Physical Therapy and medications..         Potential Aberrant Drug-Related Behavior:  No      Urine Drug Screening:  None    OARRS report:  04/2024 consistent    Opioid Agreement:  Renewal date:N/A    Past Medical History:   Diagnosis Date    Abdominal pain     Arthritis     l knee injections with dr hall    Chest pain     resolved    Difficult intubation 1-    document on chart    Hypertension     Hypothyroidism     Low back

## 2024-07-30 ENCOUNTER — OFFICE VISIT (OUTPATIENT)
Dept: PAIN MANAGEMENT | Age: 89
End: 2024-07-30
Payer: MEDICARE

## 2024-07-30 VITALS
HEIGHT: 65 IN | BODY MASS INDEX: 28.66 KG/M2 | DIASTOLIC BLOOD PRESSURE: 60 MMHG | WEIGHT: 172 LBS | OXYGEN SATURATION: 97 % | TEMPERATURE: 96.9 F | SYSTOLIC BLOOD PRESSURE: 140 MMHG | RESPIRATION RATE: 18 BRPM | HEART RATE: 56 BPM

## 2024-07-30 DIAGNOSIS — M48.061 SPINAL STENOSIS OF LUMBAR REGION WITHOUT NEUROGENIC CLAUDICATION: ICD-10-CM

## 2024-07-30 DIAGNOSIS — G89.4 CHRONIC PAIN SYNDROME: Primary | ICD-10-CM

## 2024-07-30 DIAGNOSIS — M79.2 NEURALGIA AND NEURITIS: ICD-10-CM

## 2024-07-30 DIAGNOSIS — M47.816 LUMBAR SPONDYLOSIS: ICD-10-CM

## 2024-07-30 DIAGNOSIS — M51.9 LUMBAR DISC DISORDER: ICD-10-CM

## 2024-07-30 DIAGNOSIS — M47.816 LUMBAR FACET ARTHROPATHY: ICD-10-CM

## 2024-07-30 PROCEDURE — 99213 OFFICE O/P EST LOW 20 MIN: CPT | Performed by: PAIN MEDICINE

## 2024-07-30 PROCEDURE — 1123F ACP DISCUSS/DSCN MKR DOCD: CPT | Performed by: PAIN MEDICINE

## 2024-07-30 RX ORDER — GABAPENTIN 300 MG/1
300 CAPSULE ORAL 3 TIMES DAILY
Qty: 270 CAPSULE | Refills: 0 | Status: SHIPPED | OUTPATIENT
Start: 2024-07-30 | End: 2024-10-28

## 2024-07-30 RX ORDER — PAROXETINE 10 MG/1
5 TABLET, FILM COATED ORAL
COMMUNITY
Start: 2024-07-11

## 2024-07-30 NOTE — PROGRESS NOTES
[facility]  [address], [place] [address]  [phone]    Follow up Note      Ama Urias     Date of Visit:  7/30/2024    CC:  Patient presents for follow up   Chief Complaint   Patient presents with    Back Pain    Leg Pain     HPI:  Follow up on her low back pain with no acute issues.  Appropriate analgesia with current medications regimen: yes - .    Change in quality of symptoms:none  Medication side effects:none.   Recent diagnostic testing:none.   Recent interventional procedures:none    She has not been on anticoagulation medications to include none. The patient  has not been on herbal supplements.  The patient is not diabetic.     Imaging:   Lumbar spine MRI   Multilevel lumbar spondylosis most pronounced at L3-4, there is diffuse disc bulge with facet hypertrophy resulting in moderate central canal and bilateral foraminal narrowing.  Mild right foraminal narrowing at L2-3 and mild bilateral foraminal narrowing at L4-5 and L5-S1.     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     The study also reveals evidence of chronic neurogenic changes in the right S1 myotome indicative of a right chronic S1 radiculopathy.     Previous treatments: Physical Therapy and medications..         Potential Aberrant Drug-Related Behavior:  No      Urine Drug Screening:  None    OARRS report:  07/2024 consistent    Opioid Agreement:  Renewal date:N/A    Past Medical History:   Diagnosis Date    Abdominal pain     Arthritis     l knee injections with dr hall    Chest pain     resolved    Difficult intubation 1-    document on chart    Hypertension     Hypothyroidism     Low back pain     Migraine headache     Neuropathy

## 2024-07-30 NOTE — PROGRESS NOTES
Ama Urias presents to the [facility] on 7/30/2024. Ama is complaining of pain in her lower back that radiates to BLE. The pain is intermittent. The pain is described as aching and tingling. Pain is rated on her best day at a 2, on her worst day at a 10, and on average at a 4 on the VAS scale. She took her last dose of Neurontin and Tylenol today.      Any procedures since your last visit: No    She is not on NSAIDS and  is not on anticoagulation medications to include none and is managed by NA.     Pacemaker or defibrillator: No     Medication Contract and Consent for Opioid Use Documents Filed        No documents found                       Temp 96.9 °F (36.1 °C) (Infrared)   Resp 18   Ht 1.651 m (5' 5\")   Wt 78 kg (172 lb)   BMI 28.62 kg/m²      No LMP recorded. Patient is postmenopausal.

## 2024-09-11 ENCOUNTER — HOSPITAL ENCOUNTER (OUTPATIENT)
Dept: ULTRASOUND IMAGING | Age: 89
Discharge: HOME | End: 2024-09-13
Payer: MEDICARE

## 2024-09-11 ENCOUNTER — TRANSCRIBE ORDERS (OUTPATIENT)
Dept: ADMINISTRATIVE | Age: 89
End: 2024-09-11

## 2024-09-11 DIAGNOSIS — M79.604 RIGHT LEG PAIN: ICD-10-CM

## 2024-09-11 DIAGNOSIS — M79.604 RIGHT LEG PAIN: Primary | ICD-10-CM

## 2024-09-11 PROCEDURE — 93971 EXTREMITY STUDY: CPT

## 2024-10-16 DIAGNOSIS — D47.2 MONOCLONAL GAMMOPATHY: ICD-10-CM

## 2024-10-16 DIAGNOSIS — D47.2 MONOCLONAL GAMMOPATHY: Primary | ICD-10-CM

## 2024-10-16 LAB
ALBUMIN: 4.3 G/DL (ref 3.5–5.2)
ALP BLD-CCNC: 96 U/L (ref 35–104)
ALT SERPL-CCNC: 13 U/L (ref 0–32)
ANION GAP SERPL CALCULATED.3IONS-SCNC: 14 MMOL/L (ref 7–16)
AST SERPL-CCNC: 22 U/L (ref 0–31)
BASOPHILS ABSOLUTE: 0.05 K/UL (ref 0–0.2)
BASOPHILS RELATIVE PERCENT: 1 % (ref 0–2)
BILIRUB SERPL-MCNC: 0.9 MG/DL (ref 0–1.2)
BUN BLDV-MCNC: 21 MG/DL (ref 6–23)
CALCIUM SERPL-MCNC: 9.7 MG/DL (ref 8.6–10.2)
CHLORIDE BLD-SCNC: 97 MMOL/L (ref 98–107)
CO2: 25 MMOL/L (ref 22–29)
CREAT SERPL-MCNC: 1 MG/DL (ref 0.5–1)
EOSINOPHILS ABSOLUTE: 0.15 K/UL (ref 0.05–0.5)
EOSINOPHILS RELATIVE PERCENT: 2 % (ref 0–6)
GFR, ESTIMATED: 56 ML/MIN/1.73M2
GLUCOSE BLD-MCNC: 95 MG/DL (ref 74–99)
HCT VFR BLD CALC: 41.9 % (ref 34–48)
HEMOGLOBIN: 13.5 G/DL (ref 11.5–15.5)
IMMATURE GRANULOCYTES %: 0 % (ref 0–5)
IMMATURE GRANULOCYTES ABSOLUTE: <0.03 K/UL (ref 0–0.58)
LACTATE DEHYDROGENASE: 180 U/L (ref 135–214)
LYMPHOCYTES ABSOLUTE: 2.35 K/UL (ref 1.5–4)
LYMPHOCYTES RELATIVE PERCENT: 35 % (ref 20–42)
MCH RBC QN AUTO: 30.1 PG (ref 26–35)
MCHC RBC AUTO-ENTMCNC: 32.2 G/DL (ref 32–34.5)
MCV RBC AUTO: 93.5 FL (ref 80–99.9)
MONOCYTES ABSOLUTE: 0.6 K/UL (ref 0.1–0.95)
MONOCYTES RELATIVE PERCENT: 9 % (ref 2–12)
NEUTROPHILS ABSOLUTE: 3.5 K/UL (ref 1.8–7.3)
NEUTROPHILS RELATIVE PERCENT: 52 % (ref 43–80)
PDW BLD-RTO: 13.6 % (ref 11.5–15)
PLATELET # BLD: 251 K/UL (ref 130–450)
PMV BLD AUTO: 10.8 FL (ref 7–12)
POTASSIUM SERPL-SCNC: 4.3 MMOL/L (ref 3.5–5)
RBC # BLD: 4.48 M/UL (ref 3.5–5.5)
SODIUM BLD-SCNC: 136 MMOL/L (ref 132–146)
TOTAL PROTEIN: 6.9 G/DL (ref 6.4–8.3)
WBC # BLD: 6.7 K/UL (ref 4.5–11.5)

## 2024-10-17 LAB
IGA: 112 MG/DL (ref 70–400)
IGG: 1020 MG/DL (ref 700–1600)
IGM: 94 MG/DL (ref 40–230)

## 2024-10-18 LAB
BETA-2 MICROGLOBULIN: 3.1 MG/L
FREE KAPPA/LAMBDA RATIO: 2.02 (ref 0.22–1.74)
KAPPA FREE LIGHT CHAINS QNT: 46 MG/L
LAMBDA FREE LIGHT CHAINS QNT: 22.8 MG/L (ref 4.2–27.7)

## 2024-10-19 LAB
ALBUMIN (CALCULATED): 3.5 G/DL (ref 3.5–4.7)
ALPHA-1-GLOBULIN: 0.3 G/DL (ref 0.2–0.4)
ALPHA-2-GLOBULIN: 0.9 G/DL (ref 0.5–1)
BETA GLOBULIN: 0.8 G/DL (ref 0.8–1.3)
GAMMA GLOBULIN: 1 G/DL (ref 0.7–1.6)
M SPIKE 1 SERUM PROTEIN ELEC: 0.6 G/DL
PATHOLOGIST REVIEW: NORMAL
PATHOLOGIST: NORMAL
PROTEIN ELECTROPHORESIS, SERUM: NORMAL
SERUM IFX INTERP: NORMAL
TOTAL PROTEIN: 6.6 G/DL (ref 6.4–8.3)

## 2024-10-23 ENCOUNTER — OFFICE VISIT (OUTPATIENT)
Dept: ONCOLOGY | Age: 89
End: 2024-10-23

## 2024-10-23 ENCOUNTER — HOSPITAL ENCOUNTER (OUTPATIENT)
Dept: INFUSION THERAPY | Age: 89
Discharge: HOME | End: 2024-10-23

## 2024-10-23 VITALS
WEIGHT: 176 LBS | HEIGHT: 65 IN | HEART RATE: 67 BPM | SYSTOLIC BLOOD PRESSURE: 168 MMHG | TEMPERATURE: 97.5 F | BODY MASS INDEX: 29.32 KG/M2 | DIASTOLIC BLOOD PRESSURE: 53 MMHG | OXYGEN SATURATION: 96 %

## 2024-10-23 DIAGNOSIS — D47.2 MONOCLONAL GAMMOPATHY: Primary | ICD-10-CM

## 2024-10-23 NOTE — PROGRESS NOTES
[facility]  [address] [address]  Dept: [phone]  Loc: [phone]  Attending Progress Note      Reason for Visit:   Monoclonal gammopathy.    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  The patient has pain from peripheral neuropathy, overall stable.  Also pain in the lateral aspect of the right thigh. No history of anemia, kidney disease or hypercalcemia.  The patient has chronic back pain, no new bony pain, she has peripheral neuropathy, the patient did not benefit from the epidurals.  The patient is doing well overall.    Review of Systems;  CONSTITUTIONAL: No fever, chills.  Fair appetite and energy level.  ENMT: Eyes: No diplopia; Nose: No epistaxis. Mouth: No sore throat.   RESPIRATORY: No hemoptysis, shortness of breath, cough.   CARDIOVASCULAR: No chest pain, palpitations.  GASTROINTESTINAL: No nausea/vomiting, she had right-sided abdominal pain, was seen in the ED on 4/10/2023, had CT scan of the abdomen the pelvis done, revealing cholelithiasis and renal cyst, she is following with surgery.  GENITOURINARY: No dysuria, urinary frequency, hematuria.  NEURO: No syncope, presyncope, pos for migraine headache, none recently. Peripheral neuropathy (tingling) in lower legs. Denies any lightheadedness.   Remainder:  ROS NEGATIVE    Past Medical History:      Diagnosis Date    Abdominal pain     Arthritis     l knee injections with dr hall    Chest pain     resolved    Difficult intubation

## 2024-10-30 ENCOUNTER — OFFICE VISIT (OUTPATIENT)
Dept: PAIN MANAGEMENT | Age: 89
End: 2024-10-30

## 2024-10-30 VITALS
BODY MASS INDEX: 29.32 KG/M2 | OXYGEN SATURATION: 96 % | RESPIRATION RATE: 18 BRPM | SYSTOLIC BLOOD PRESSURE: 108 MMHG | DIASTOLIC BLOOD PRESSURE: 52 MMHG | HEART RATE: 64 BPM | WEIGHT: 176 LBS | TEMPERATURE: 96.4 F | HEIGHT: 65 IN

## 2024-10-30 DIAGNOSIS — M48.061 SPINAL STENOSIS OF LUMBAR REGION WITHOUT NEUROGENIC CLAUDICATION: ICD-10-CM

## 2024-10-30 DIAGNOSIS — M51.9 LUMBAR DISC DISORDER: ICD-10-CM

## 2024-10-30 DIAGNOSIS — G89.4 CHRONIC PAIN SYNDROME: Primary | ICD-10-CM

## 2024-10-30 DIAGNOSIS — M79.2 NEURALGIA AND NEURITIS: ICD-10-CM

## 2024-10-30 DIAGNOSIS — M47.816 LUMBAR SPONDYLOSIS: ICD-10-CM

## 2024-10-30 DIAGNOSIS — M47.816 LUMBAR FACET ARTHROPATHY: ICD-10-CM

## 2024-10-30 RX ORDER — AMOXICILLIN 500 MG/1
CAPSULE ORAL
COMMUNITY
Start: 2024-10-16

## 2024-10-30 RX ORDER — GABAPENTIN 300 MG/1
300 CAPSULE ORAL 3 TIMES DAILY
Qty: 270 CAPSULE | Refills: 1 | Status: SHIPPED | OUTPATIENT
Start: 2024-10-30 | End: 2025-04-28

## 2024-10-30 NOTE — PROGRESS NOTES
Ama Urias presents to the [facility] on 10/30/2024. Ama is complaining of pain lower back, radiates to BLE. With tingling. The pain is constant. The pain is described as aching and tingling. Pain is rated on her best day at a 1, on her worst day at a 9, and on average at a 4 on the VAS scale. She took her last dose of Neurontin and Extra strength Tylenol  Tylenol is rarely.      Any procedures since your last visit: No    She is not on NSAIDS and  is not on anticoagulation medications   Pacemaker or defibrillator: No   Medication Contract and Consent for Opioid Use Documents Filed        No documents found                       Resp 18   Ht 1.651 m (5' 5\")   Wt 79.8 kg (176 lb)   BMI 29.29 kg/m²      No LMP recorded. Patient is postmenopausal.  
   Osteopenia     Palpitation     resolved    Plantar fasciitis     Skipped heart beats     Tingling of both feet     Tinnitus     hissing bilateral     Past Surgical History:   Procedure Laterality Date    BLADDER SURGERY      mesh    BREAST RECONSTRUCTION Bilateral 2004, 2007    COLONOSCOPY  10/13/2020    DILATION AND CURETTAGE OF UTERUS      EYE SURGERY Bilateral     cataract removal    INCONTINENCE SURGERY      MASTECTOMY Bilateral 1982    for fibrocystic breast disease; has saline implants Ok to take B/P either arm    PAIN MANAGEMENT PROCEDURE Right 04/12/2021    LUMBAR EPIDURAL STEROID INJECTION L5-S1 RIGHT PARAMEDIAN WITH 80 DEPO **ALLERGIC TO IV DYE** performed by Jose Moore MD at [facility] OR    KY ENDOVEN ABLTJ INCMPTNT VEIN XTR RF 1ST VEIN Right 08/10/2018    RIGHT GREASTER SAPHENOUS VEIN STAB PHLEBECTOMIES [place] [facility] [place]    TONSILLECTOMY       Prior to Admission medications    Medication Sig Start Date End Date Taking? Authorizing Provider   amoxicillin (AMOXIL) 500 MG capsule  10/16/24  Yes Provider, Historical, MD   pantoprazole (PROTONIX) 40 MG tablet  4/11/24  Yes Provider, Historical, MD   clotrimazole-betamethasone (LOTRISONE) 1-0.05 % cream  1/29/24  Yes Provider, Historical, MD   atorvastatin (LIPITOR) 20 MG tablet Take 1 tablet by mouth daily 1/4/24  Yes Antony Malcolm MD   isosorbide mononitrate (IMDUR) 30 MG extended release tablet Take 1 tablet by mouth daily 1/4/24  Yes Antony Malcolm MD   losartan-hydroCHLOROthiazide (HYZAAR) 100-12.5 MG per tablet Take 1 tablet by mouth daily 1/4/24  Yes Antony Malcolm MD   metoprolol succinate (TOPROL XL) 50 MG extended release tablet Take 1 tablet by mouth 2 times daily 1/4/24  Yes Antony Malcolm MD   estradiol (ESTRACE) 0.1 MG/GM vaginal cream  8/12/22  Yes Provider, MD Latosha   psyllium (KONSYL) 28.3 % PACK Take 1 packet by mouth as needed for Constipation   Yes Provider, Historical, MD   [employer]on

## 2024-12-13 ENCOUNTER — APPOINTMENT (OUTPATIENT)
Dept: CT IMAGING | Age: 89
End: 2024-12-13
Payer: MEDICARE

## 2024-12-13 ENCOUNTER — HOSPITAL ENCOUNTER (EMERGENCY)
Age: 89
Discharge: HOME | End: 2024-12-13
Attending: EMERGENCY MEDICINE
Payer: MEDICARE

## 2024-12-13 VITALS
BODY MASS INDEX: 28.32 KG/M2 | DIASTOLIC BLOOD PRESSURE: 79 MMHG | SYSTOLIC BLOOD PRESSURE: 147 MMHG | HEIGHT: 65 IN | TEMPERATURE: 97 F | RESPIRATION RATE: 14 BRPM | HEART RATE: 72 BPM | WEIGHT: 170 LBS | OXYGEN SATURATION: 96 %

## 2024-12-13 DIAGNOSIS — K80.20 GALLSTONES: ICD-10-CM

## 2024-12-13 DIAGNOSIS — K59.00 CONSTIPATION, UNSPECIFIED CONSTIPATION TYPE: ICD-10-CM

## 2024-12-13 DIAGNOSIS — R10.10 PAIN OF UPPER ABDOMEN: Primary | ICD-10-CM

## 2024-12-13 LAB
ANION GAP SERPL CALCULATED.3IONS-SCNC: 8 MMOL/L (ref 7–16)
BASOPHILS # BLD: 0.05 K/UL (ref 0–0.2)
BASOPHILS NFR BLD: 1 % (ref 0–2)
BILIRUB UR QL STRIP: NEGATIVE
BUN SERPL-MCNC: 18 MG/DL (ref 6–23)
CALCIUM SERPL-MCNC: 9.7 MG/DL (ref 8.6–10.2)
CHLORIDE SERPL-SCNC: 97 MMOL/L (ref 98–107)
CLARITY UR: CLEAR
CO2 SERPL-SCNC: 28 MMOL/L (ref 22–29)
COLOR UR: YELLOW
CREAT SERPL-MCNC: 1 MG/DL (ref 0.5–1)
EOSINOPHIL # BLD: 0.15 K/UL (ref 0.05–0.5)
EOSINOPHILS RELATIVE PERCENT: 2 % (ref 0–6)
ERYTHROCYTE [DISTWIDTH] IN BLOOD BY AUTOMATED COUNT: 13.8 % (ref 11.5–15)
GFR, ESTIMATED: 56 ML/MIN/1.73M2
GLUCOSE SERPL-MCNC: 103 MG/DL (ref 74–99)
GLUCOSE UR STRIP-MCNC: NEGATIVE MG/DL
HCT VFR BLD AUTO: 40.7 % (ref 34–48)
HGB BLD-MCNC: 13.3 G/DL (ref 11.5–15.5)
HGB UR QL STRIP.AUTO: NEGATIVE
IMM GRANULOCYTES # BLD AUTO: <0.03 K/UL (ref 0–0.58)
IMM GRANULOCYTES NFR BLD: 0 % (ref 0–5)
KETONES UR STRIP-MCNC: NEGATIVE MG/DL
LEUKOCYTE ESTERASE UR QL STRIP: NEGATIVE
LYMPHOCYTES NFR BLD: 2.04 K/UL (ref 1.5–4)
LYMPHOCYTES RELATIVE PERCENT: 27 % (ref 20–42)
MCH RBC QN AUTO: 30.1 PG (ref 26–35)
MCHC RBC AUTO-ENTMCNC: 32.7 G/DL (ref 32–34.5)
MCV RBC AUTO: 92.1 FL (ref 80–99.9)
MONOCYTES NFR BLD: 0.88 K/UL (ref 0.1–0.95)
MONOCYTES NFR BLD: 12 % (ref 2–12)
NEUTROPHILS NFR BLD: 59 % (ref 43–80)
NEUTS SEG NFR BLD: 4.43 K/UL (ref 1.8–7.3)
NITRITE UR QL STRIP: NEGATIVE
PH UR STRIP: 7 [PH] (ref 5–9)
PLATELET # BLD AUTO: 250 K/UL (ref 130–450)
PMV BLD AUTO: 9.9 FL (ref 7–12)
POTASSIUM SERPL-SCNC: 4.4 MMOL/L (ref 3.5–5)
PROT UR STRIP-MCNC: NEGATIVE MG/DL
RBC # BLD AUTO: 4.42 M/UL (ref 3.5–5.5)
RBC #/AREA URNS HPF: ABNORMAL /HPF
SODIUM SERPL-SCNC: 133 MMOL/L (ref 132–146)
SP GR UR STRIP: <1.005 (ref 1–1.03)
UROBILINOGEN UR STRIP-ACNC: 0.2 EU/DL (ref 0–1)
WBC #/AREA URNS HPF: ABNORMAL /HPF
WBC OTHER # BLD: 7.6 K/UL (ref 4.5–11.5)

## 2024-12-13 PROCEDURE — 87086 URINE CULTURE/COLONY COUNT: CPT

## 2024-12-13 PROCEDURE — 81001 URINALYSIS AUTO W/SCOPE: CPT

## 2024-12-13 PROCEDURE — 99284 EMERGENCY DEPT VISIT MOD MDM: CPT

## 2024-12-13 PROCEDURE — 80048 BASIC METABOLIC PNL TOTAL CA: CPT

## 2024-12-13 PROCEDURE — 85025 COMPLETE CBC W/AUTO DIFF WBC: CPT

## 2024-12-13 PROCEDURE — 74176 CT ABD & PELVIS W/O CONTRAST: CPT

## 2024-12-13 ASSESSMENT — PAIN SCALES - GENERAL: PAINLEVEL_OUTOF10: 2

## 2024-12-13 ASSESSMENT — PAIN DESCRIPTION - LOCATION: LOCATION: ABDOMEN

## 2024-12-13 ASSESSMENT — PAIN - FUNCTIONAL ASSESSMENT: PAIN_FUNCTIONAL_ASSESSMENT: 0-10

## 2024-12-13 NOTE — ED PROVIDER NOTES
HPI:  12/13/24, Time: 2:49 PM ANIVAL Urias is a 89 y.o. female presenting to the ED for presents with generalized abdominal ache lower left quadrant mid abdomen.  Onset 4 days ago had normal bowel movement today.  She has no nausea vomiting.  No hematochezia or melena reported.  She has no urinary complaints.  She has had a colonoscopy several years ago.  Her abdominal surgeries included bladder sling surgery.  No close contacts ill.  She has no fevers or chills., beginning 4 days ago.  The complaint has been persistent, moderate in severity, and worsened by nothing.      Review of Systems:   A complete review of systems was performed and pertinent positives and negatives are stated within HPI, all other systems reviewed and are negative.    --------------------------------------------- PAST HISTORY ---------------------------------------------  Past Medical History:  has a past medical history of Abdominal pain, Arthritis, Chest pain, Difficult intubation, Hypertension, Hypothyroidism, Low back pain, Migraine headache, Neuropathy, Osteopenia, Palpitation, Plantar fasciitis, Skipped heart beats, Tingling of both feet, and Tinnitus.    Past Surgical History:  has a past surgical history that includes Tonsillectomy; Mastectomy (Bilateral, 1982); Incontinence surgery; Breast reconstruction (Bilateral, 2004, 2007); Dilation and curettage of uterus; eye surgery (Bilateral); Colonoscopy (10/13/2020); pr endoven abltj incmptnt vein xtr rf 1st vein (Right, 08/10/2018); Bladder surgery; and Pain management procedure (Right, 04/12/2021).    Social History:  reports that she has never smoked. She has never used smokeless tobacco. She reports that she does not drink alcohol and does not use drugs.    Family History: family history includes Atrial Fibrillation in her father; Cancer in her paternal uncle; Coronary Art Dis in her maternal grandfather; Kidney Disease in her mother.     The patient’s home

## 2024-12-13 NOTE — ED TRIAGE NOTES
FIRST PROVIDER CONTACT ASSESSMENT NOTE       Department of Emergency Medicine                 First Provider Note            24  12:10 PM EST    Date of Encounter: No admission date for patient encounter.    Patient Name: Ama Urias  : 1935  MRN: [id number]    Chief Complaint: Abdominal Pain (Generalized achy pain for past 4 days. Bm today. )      History of Present Illness:   Ama Urias is a 89 y.o. female who presents to the ED for diffuse abdominal pain.   Denies fever or vomiting.    Denies diarrhea.        Focused Physical Exam:  VS:    ED Triage Vitals   BP Systolic BP Percentile Diastolic BP Percentile Temp Temp src Pulse Resp SpO2   -- -- -- -- -- -- -- --      Height Weight         -- --              Physical Ex: Constitutional: Alert and non-toxic.    Medical History:  has a past medical history of Abdominal pain, Arthritis, Chest pain, Difficult intubation, Hypertension, Hypothyroidism, Low back pain, Migraine headache, Neuropathy, Osteopenia, Palpitation, Plantar fasciitis, Skipped heart beats, Tingling of both feet, and Tinnitus.  Surgical History:  has a past surgical history that includes Tonsillectomy; Mastectomy (Bilateral, ); Incontinence surgery; Breast reconstruction (Bilateral, , ); Dilation and curettage of uterus; eye surgery (Bilateral); Colonoscopy (10/13/2020); pr endoven abltj incmptnt vein xtr rf 1st vein (Right, 08/10/2018); Bladder surgery; and Pain management procedure (Right, 2021).  Social History:  reports that she has never smoked. She has never used smokeless tobacco. She reports that she does not drink alcohol and does not use drugs.  Family History: family history includes Atrial Fibrillation in her father; Cancer in her paternal uncle; Coronary Art Dis in her maternal grandfather; Kidney Disease in her mother.    Allergies: Iv dye [iodides], Pcn [penicillins], Cefdinir, Norvasc [amlodipine], Aspirin, Bactrim, Drixoral

## 2024-12-14 LAB
MICROORGANISM SPEC CULT: NO GROWTH
SPECIMEN DESCRIPTION: NORMAL

## 2024-12-19 ENCOUNTER — APPOINTMENT (OUTPATIENT)
Dept: GENERAL RADIOLOGY | Age: 89
End: 2024-12-19
Payer: MEDICARE

## 2024-12-19 ENCOUNTER — HOSPITAL ENCOUNTER (OUTPATIENT)
Age: 89
Setting detail: OBSERVATION
Discharge: HOME | End: 2024-12-21
Attending: EMERGENCY MEDICINE | Admitting: INTERNAL MEDICINE
Payer: MEDICARE

## 2024-12-19 DIAGNOSIS — R07.9 CHEST PAIN, UNSPECIFIED TYPE: Primary | ICD-10-CM

## 2024-12-19 PROBLEM — I16.0 HYPERTENSIVE URGENCY: Status: ACTIVE | Noted: 2024-12-19

## 2024-12-19 PROBLEM — I20.89 ATYPICAL ANGINA (HCC): Status: ACTIVE | Noted: 2024-12-19

## 2024-12-19 LAB
ANION GAP SERPL CALCULATED.3IONS-SCNC: 9 MMOL/L (ref 7–16)
BASOPHILS # BLD: 0.04 K/UL (ref 0–0.2)
BASOPHILS NFR BLD: 1 % (ref 0–2)
BUN SERPL-MCNC: 14 MG/DL (ref 6–23)
CALCIUM SERPL-MCNC: 10.3 MG/DL (ref 8.6–10.2)
CHLORIDE SERPL-SCNC: 97 MMOL/L (ref 98–107)
CO2 SERPL-SCNC: 28 MMOL/L (ref 22–29)
CREAT SERPL-MCNC: 1 MG/DL (ref 0.5–1)
EKG ATRIAL RATE: 80 BPM
EKG P AXIS: 64 DEGREES
EKG P-R INTERVAL: 266 MS
EKG Q-T INTERVAL: 406 MS
EKG QRS DURATION: 144 MS
EKG QTC CALCULATION (BAZETT): 468 MS
EKG R AXIS: 59 DEGREES
EKG T AXIS: 56 DEGREES
EKG VENTRICULAR RATE: 80 BPM
EOSINOPHIL # BLD: 0.13 K/UL (ref 0.05–0.5)
EOSINOPHILS RELATIVE PERCENT: 2 % (ref 0–6)
ERYTHROCYTE [DISTWIDTH] IN BLOOD BY AUTOMATED COUNT: 13.8 % (ref 11.5–15)
GFR, ESTIMATED: 57 ML/MIN/1.73M2
GLUCOSE SERPL-MCNC: 117 MG/DL (ref 74–99)
HCT VFR BLD AUTO: 39.9 % (ref 34–48)
HGB BLD-MCNC: 13.5 G/DL (ref 11.5–15.5)
IMM GRANULOCYTES # BLD AUTO: <0.03 K/UL (ref 0–0.58)
IMM GRANULOCYTES NFR BLD: 0 % (ref 0–5)
LYMPHOCYTES NFR BLD: 1.46 K/UL (ref 1.5–4)
LYMPHOCYTES RELATIVE PERCENT: 24 % (ref 20–42)
MCH RBC QN AUTO: 30.5 PG (ref 26–35)
MCHC RBC AUTO-ENTMCNC: 33.8 G/DL (ref 32–34.5)
MCV RBC AUTO: 90.1 FL (ref 80–99.9)
MONOCYTES NFR BLD: 0.75 K/UL (ref 0.1–0.95)
MONOCYTES NFR BLD: 12 % (ref 2–12)
NEUTROPHILS NFR BLD: 61 % (ref 43–80)
NEUTS SEG NFR BLD: 3.7 K/UL (ref 1.8–7.3)
PLATELET # BLD AUTO: 239 K/UL (ref 130–450)
PMV BLD AUTO: 9.8 FL (ref 7–12)
POTASSIUM SERPL-SCNC: 4.5 MMOL/L (ref 3.5–5)
RBC # BLD AUTO: 4.43 M/UL (ref 3.5–5.5)
SODIUM SERPL-SCNC: 134 MMOL/L (ref 132–146)
TROPONIN I SERPL HS-MCNC: 14 NG/L (ref 0–9)
TROPONIN I SERPL HS-MCNC: 22 NG/L (ref 0–9)
WBC OTHER # BLD: 6.1 K/UL (ref 4.5–11.5)

## 2024-12-19 PROCEDURE — 6370000000 HC RX 637 (ALT 250 FOR IP): Performed by: HOSPITALIST

## 2024-12-19 PROCEDURE — 96376 TX/PRO/DX INJ SAME DRUG ADON: CPT

## 2024-12-19 PROCEDURE — 2500000003 HC RX 250 WO HCPCS: Performed by: HOSPITALIST

## 2024-12-19 PROCEDURE — G0378 HOSPITAL OBSERVATION PER HR: HCPCS

## 2024-12-19 PROCEDURE — 93005 ELECTROCARDIOGRAM TRACING: CPT | Performed by: EMERGENCY MEDICINE

## 2024-12-19 PROCEDURE — 99285 EMERGENCY DEPT VISIT HI MDM: CPT

## 2024-12-19 PROCEDURE — 6360000002 HC RX W HCPCS: Performed by: HOSPITALIST

## 2024-12-19 PROCEDURE — 96372 THER/PROPH/DIAG INJ SC/IM: CPT

## 2024-12-19 PROCEDURE — 71045 X-RAY EXAM CHEST 1 VIEW: CPT

## 2024-12-19 PROCEDURE — APPSS60 APP SPLIT SHARED TIME 46-60 MINUTES

## 2024-12-19 PROCEDURE — 80048 BASIC METABOLIC PNL TOTAL CA: CPT

## 2024-12-19 PROCEDURE — 6360000002 HC RX W HCPCS: Performed by: EMERGENCY MEDICINE

## 2024-12-19 PROCEDURE — 85025 COMPLETE CBC W/AUTO DIFF WBC: CPT

## 2024-12-19 PROCEDURE — 99222 1ST HOSP IP/OBS MODERATE 55: CPT | Performed by: INTERNAL MEDICINE

## 2024-12-19 PROCEDURE — 93010 ELECTROCARDIOGRAM REPORT: CPT | Performed by: INTERNAL MEDICINE

## 2024-12-19 PROCEDURE — 96374 THER/PROPH/DIAG INJ IV PUSH: CPT

## 2024-12-19 PROCEDURE — 84484 ASSAY OF TROPONIN QUANT: CPT

## 2024-12-19 RX ORDER — METOPROLOL SUCCINATE 50 MG/1
50 TABLET, EXTENDED RELEASE ORAL 2 TIMES DAILY
Status: DISCONTINUED | OUTPATIENT
Start: 2024-12-19 | End: 2024-12-21 | Stop reason: HOSPADM

## 2024-12-19 RX ORDER — SODIUM CHLORIDE 0.9 % (FLUSH) 0.9 %
5-40 SYRINGE (ML) INJECTION PRN
Status: DISCONTINUED | OUTPATIENT
Start: 2024-12-19 | End: 2024-12-21 | Stop reason: HOSPADM

## 2024-12-19 RX ORDER — HYDRALAZINE HYDROCHLORIDE 20 MG/ML
10 INJECTION INTRAMUSCULAR; INTRAVENOUS EVERY 6 HOURS PRN
Status: DISCONTINUED | OUTPATIENT
Start: 2024-12-19 | End: 2024-12-20

## 2024-12-19 RX ORDER — DOCUSATE SODIUM 100 MG/1
100 CAPSULE, LIQUID FILLED ORAL DAILY PRN
COMMUNITY

## 2024-12-19 RX ORDER — ACETAMINOPHEN 325 MG/1
650 TABLET ORAL EVERY 6 HOURS PRN
Status: DISCONTINUED | OUTPATIENT
Start: 2024-12-19 | End: 2024-12-21 | Stop reason: HOSPADM

## 2024-12-19 RX ORDER — ATORVASTATIN CALCIUM 20 MG/1
20 TABLET, FILM COATED ORAL NIGHTLY
Status: DISCONTINUED | OUTPATIENT
Start: 2024-12-19 | End: 2024-12-21 | Stop reason: HOSPADM

## 2024-12-19 RX ORDER — GABAPENTIN 300 MG/1
300 CAPSULE ORAL 3 TIMES DAILY
Status: DISCONTINUED | OUTPATIENT
Start: 2024-12-19 | End: 2024-12-21 | Stop reason: HOSPADM

## 2024-12-19 RX ORDER — VITAMIN B COMPLEX
2000 TABLET ORAL EVERY MORNING
Status: DISCONTINUED | OUTPATIENT
Start: 2024-12-20 | End: 2024-12-21 | Stop reason: HOSPADM

## 2024-12-19 RX ORDER — ISOSORBIDE MONONITRATE 30 MG/1
30 TABLET, EXTENDED RELEASE ORAL 2 TIMES DAILY
Status: DISCONTINUED | OUTPATIENT
Start: 2024-12-19 | End: 2024-12-19

## 2024-12-19 RX ORDER — POLYETHYLENE GLYCOL 3350 17 G/17G
17 POWDER, FOR SOLUTION ORAL DAILY PRN
Status: DISCONTINUED | OUTPATIENT
Start: 2024-12-19 | End: 2024-12-21 | Stop reason: HOSPADM

## 2024-12-19 RX ORDER — PHENOL 1.4 %
600 AEROSOL, SPRAY (ML) MUCOUS MEMBRANE 2 TIMES DAILY
COMMUNITY

## 2024-12-19 RX ORDER — AMLODIPINE BESYLATE 5 MG/1
5 TABLET ORAL DAILY
Status: DISCONTINUED | OUTPATIENT
Start: 2024-12-19 | End: 2024-12-20

## 2024-12-19 RX ORDER — HYDROCHLOROTHIAZIDE 12.5 MG/1
12.5 TABLET ORAL DAILY
Status: DISCONTINUED | OUTPATIENT
Start: 2024-12-20 | End: 2024-12-20

## 2024-12-19 RX ORDER — HYDRALAZINE HYDROCHLORIDE 20 MG/ML
10 INJECTION INTRAMUSCULAR; INTRAVENOUS ONCE
Status: COMPLETED | OUTPATIENT
Start: 2024-12-19 | End: 2024-12-19

## 2024-12-19 RX ORDER — CALCIUM CARBONATE 500(1250)
500 TABLET ORAL 2 TIMES DAILY
Status: DISCONTINUED | OUTPATIENT
Start: 2024-12-19 | End: 2024-12-21 | Stop reason: HOSPADM

## 2024-12-19 RX ORDER — ENOXAPARIN SODIUM 100 MG/ML
40 INJECTION SUBCUTANEOUS DAILY
Status: DISCONTINUED | OUTPATIENT
Start: 2024-12-19 | End: 2024-12-21 | Stop reason: HOSPADM

## 2024-12-19 RX ORDER — ATORVASTATIN CALCIUM 20 MG/1
20 TABLET, FILM COATED ORAL EVERY EVENING
COMMUNITY

## 2024-12-19 RX ORDER — LOSARTAN POTASSIUM 50 MG/1
100 TABLET ORAL DAILY
Status: DISCONTINUED | OUTPATIENT
Start: 2024-12-20 | End: 2024-12-21 | Stop reason: HOSPADM

## 2024-12-19 RX ORDER — LOSARTAN POTASSIUM AND HYDROCHLOROTHIAZIDE 12.5; 1 MG/1; MG/1
1 TABLET ORAL NIGHTLY
COMMUNITY
End: 2024-12-19

## 2024-12-19 RX ORDER — POTASSIUM CHLORIDE 7.45 MG/ML
10 INJECTION INTRAVENOUS PRN
Status: DISCONTINUED | OUTPATIENT
Start: 2024-12-19 | End: 2024-12-21 | Stop reason: HOSPADM

## 2024-12-19 RX ORDER — ISOSORBIDE MONONITRATE 30 MG/1
30 TABLET, EXTENDED RELEASE ORAL DAILY
Status: DISCONTINUED | OUTPATIENT
Start: 2024-12-20 | End: 2024-12-21 | Stop reason: HOSPADM

## 2024-12-19 RX ORDER — MAGNESIUM SULFATE IN WATER 40 MG/ML
2000 INJECTION, SOLUTION INTRAVENOUS PRN
Status: DISCONTINUED | OUTPATIENT
Start: 2024-12-19 | End: 2024-12-21 | Stop reason: HOSPADM

## 2024-12-19 RX ORDER — ONDANSETRON 2 MG/ML
4 INJECTION INTRAMUSCULAR; INTRAVENOUS EVERY 6 HOURS PRN
Status: DISCONTINUED | OUTPATIENT
Start: 2024-12-19 | End: 2024-12-21 | Stop reason: HOSPADM

## 2024-12-19 RX ORDER — LEVOTHYROXINE SODIUM 75 UG/1
75 TABLET ORAL
Status: DISCONTINUED | OUTPATIENT
Start: 2024-12-20 | End: 2024-12-21 | Stop reason: HOSPADM

## 2024-12-19 RX ORDER — SODIUM CHLORIDE 0.9 % (FLUSH) 0.9 %
5-40 SYRINGE (ML) INJECTION EVERY 12 HOURS SCHEDULED
Status: DISCONTINUED | OUTPATIENT
Start: 2024-12-19 | End: 2024-12-21 | Stop reason: HOSPADM

## 2024-12-19 RX ORDER — ACETAMINOPHEN 650 MG/1
650 SUPPOSITORY RECTAL EVERY 6 HOURS PRN
Status: DISCONTINUED | OUTPATIENT
Start: 2024-12-19 | End: 2024-12-21 | Stop reason: HOSPADM

## 2024-12-19 RX ORDER — ONDANSETRON 4 MG/1
4 TABLET, ORALLY DISINTEGRATING ORAL EVERY 8 HOURS PRN
Status: DISCONTINUED | OUTPATIENT
Start: 2024-12-19 | End: 2024-12-21 | Stop reason: HOSPADM

## 2024-12-19 RX ORDER — DOCUSATE SODIUM 100 MG/1
100 CAPSULE, LIQUID FILLED ORAL DAILY PRN
Status: DISCONTINUED | OUTPATIENT
Start: 2024-12-19 | End: 2024-12-21 | Stop reason: HOSPADM

## 2024-12-19 RX ORDER — SODIUM CHLORIDE 9 MG/ML
INJECTION, SOLUTION INTRAVENOUS PRN
Status: DISCONTINUED | OUTPATIENT
Start: 2024-12-19 | End: 2024-12-21 | Stop reason: HOSPADM

## 2024-12-19 RX ORDER — POTASSIUM CHLORIDE 1500 MG/1
40 TABLET, EXTENDED RELEASE ORAL PRN
Status: DISCONTINUED | OUTPATIENT
Start: 2024-12-19 | End: 2024-12-21 | Stop reason: HOSPADM

## 2024-12-19 RX ADMIN — METOPROLOL SUCCINATE 50 MG: 50 TABLET, EXTENDED RELEASE ORAL at 21:00

## 2024-12-19 RX ADMIN — AMITRIPTYLINE HYDROCHLORIDE 25 MG: 25 TABLET, FILM COATED ORAL at 21:51

## 2024-12-19 RX ADMIN — GABAPENTIN 300 MG: 300 CAPSULE ORAL at 21:00

## 2024-12-19 RX ADMIN — HYDRALAZINE HYDROCHLORIDE 10 MG: 20 INJECTION INTRAMUSCULAR; INTRAVENOUS at 13:33

## 2024-12-19 RX ADMIN — ATORVASTATIN CALCIUM 20 MG: 20 TABLET, FILM COATED ORAL at 21:00

## 2024-12-19 RX ADMIN — CALCIUM 500 MG: 500 TABLET ORAL at 21:51

## 2024-12-19 RX ADMIN — SODIUM CHLORIDE, PRESERVATIVE FREE 10 ML: 5 INJECTION INTRAVENOUS at 21:00

## 2024-12-19 RX ADMIN — ACETAMINOPHEN 650 MG: 325 TABLET ORAL at 21:50

## 2024-12-19 RX ADMIN — ENOXAPARIN SODIUM 40 MG: 100 INJECTION SUBCUTANEOUS at 21:50

## 2024-12-19 RX ADMIN — HYDRALAZINE HYDROCHLORIDE 10 MG: 20 INJECTION INTRAMUSCULAR; INTRAVENOUS at 22:32

## 2024-12-19 ASSESSMENT — PAIN DESCRIPTION - LOCATION
LOCATION: CHEST
LOCATION: HEAD

## 2024-12-19 ASSESSMENT — ENCOUNTER SYMPTOMS
SHORTNESS OF BREATH: 0
EYE PAIN: 0
NAUSEA: 0
ABDOMINAL DISTENTION: 0
DIARRHEA: 0
EYE DISCHARGE: 0
SINUS PRESSURE: 0
COUGH: 0
EYE REDNESS: 0
VOMITING: 0
WHEEZING: 0
SORE THROAT: 0
BACK PAIN: 0

## 2024-12-19 ASSESSMENT — PAIN SCALES - GENERAL
PAINLEVEL_OUTOF10: 0
PAINLEVEL_OUTOF10: 7
PAINLEVEL_OUTOF10: 3
PAINLEVEL_OUTOF10: 0

## 2024-12-19 ASSESSMENT — PAIN - FUNCTIONAL ASSESSMENT
PAIN_FUNCTIONAL_ASSESSMENT: ACTIVITIES ARE NOT PREVENTED
PAIN_FUNCTIONAL_ASSESSMENT: NONE - DENIES PAIN
PAIN_FUNCTIONAL_ASSESSMENT: 0-10

## 2024-12-19 ASSESSMENT — LIFESTYLE VARIABLES
HOW MANY STANDARD DRINKS CONTAINING ALCOHOL DO YOU HAVE ON A TYPICAL DAY: PATIENT DOES NOT DRINK
HOW OFTEN DO YOU HAVE A DRINK CONTAINING ALCOHOL: NEVER

## 2024-12-19 ASSESSMENT — PAIN SCALES - WONG BAKER: WONGBAKER_NUMERICALRESPONSE: NO HURT

## 2024-12-19 ASSESSMENT — PAIN DESCRIPTION - ORIENTATION: ORIENTATION: RIGHT;LEFT;MID

## 2024-12-19 ASSESSMENT — PAIN DESCRIPTION - DESCRIPTORS: DESCRIPTORS: ACHING;SORE

## 2024-12-19 NOTE — CONSULTS
Inpatient Cardiology Consultation      Reason for Consult: Chest pain    Consulting Physician: Dr. Barrett    Requesting Physician:  Jett Butts DO    Date of Consultation: 12/19/2024    HISTORY OF PRESENT ILLNESS:   Patient is a 89-year-old female who is known to [facility] cardiology through Dr. Malcoml.  She was last seen outpatient 1//2024.    HPI:  Patient presented to ER 12/19/2024 for chest pain going on for 1 to 2 days.  Patient reporting hypertension at home as well.  Patient given IV hydralazine 10 mg at 1300 for hypertension.  VS upon arrival 97.9, 18 respirations, 68 pulse, 145/76, 100% room air.  Labs: Potassium 4.5, chloride 97, BUN 14, creatinine 1.0, GFR 57, glucose 117, calcium 10.3, troponin 22 > 14 (2020: <0.01), WBC 6.1, H&H 13.5/39.9, platelet 239  CXR: No acute process    Upon assessment today 12/19/2024 patient reports for the last 2 days she has had constant chest pain that is retrosternal with radiation straight through to her back that feels pressure-like.  She reports this pain is nonexertional and nonradiating.  Thing makes the pain worse including position change or deep breathing.  She reports nothing is made the pain better at this time either.  She reports she checked her blood pressure earlier today and it was 200 systolic which made her come to the hospital.  She reports no recent trauma or heavy lifting of objects that could have exacerbated musculoskeletal chest pain.  Exam is unremarkable and she has no significant pain on palpation to anterior chest.  Currently not on telemetry monitoring.    Most recent VS 98.1, 18 respirations, 65 pulse, 150/58, 97% room air.    Please note: past medical records were reviewed per electronic medical record (EMR) - see detailed reports under Past Medical/ Surgical History.   Past Medical History:    History of symptomatic palpitations  Hypertension  Hyperlipidemia  Hypothyroidism  TIA  Tinnitus  Neuropathy bilateral lower extremities  History

## 2024-12-19 NOTE — CARE COORDINATION
Internal Medicine On-call Care Coordination Note     I was called by the ED physician because they recommended admission for this patient and we cover their PCP.  The history as I understand it after discussion with the ED physician is as follows:     Admit for chest pain     I placed admission orders.  Including:     Encourage med compliance  Stress test in the AM     Dr. Lanza or his coverage will see the patient tomorrow for H&P and review of all orders.     Electronically signed by Chaz Oleary MD on 12/19/2024 at 5:55 PM

## 2024-12-19 NOTE — ED PROVIDER NOTES
89-year-old female presents to the emergency department with chest pain.  The patient states has been going on for approximately last day and 1/2 to 2 days.  She states she had elevated blood pressure at home which concerned her and this prompted her to come to the emergency department evaluation.  She reports following with Dr. Malcolm of cardiology she reports history of high blood pressure and hyperlipidemia.  She states no fevers or chills no urinary symptoms leg swelling or other complaints at this time    The history is provided by the patient.   Chest Pain  Pain location:  Substernal area  Pain quality: pressure    Pain radiates to:  Does not radiate  Pain severity:  Mild  Onset quality:  Gradual  Duration:  2 days  Timing:  Intermittent  Progression:  Waxing and waning  Chronicity:  New  Relieved by:  Nothing  Worsened by:  Nothing  Ineffective treatments:  None tried  Associated symptoms: no back pain, no cough, no fever, no headache, no nausea, no shortness of breath, no vomiting and no weakness    Risk factors: high cholesterol and hypertension         Review of Systems   Constitutional:  Negative for chills and fever.   HENT:  Negative for ear pain, sinus pressure and sore throat.    Eyes:  Negative for pain, discharge and redness.   Respiratory:  Negative for cough, shortness of breath and wheezing.    Cardiovascular:  Positive for chest pain.   Gastrointestinal:  Negative for abdominal distention, diarrhea, nausea and vomiting.   Genitourinary:  Negative for dysuria and frequency.   Musculoskeletal:  Negative for arthralgias and back pain.   Skin:  Negative for rash and wound.   Neurological:  Negative for weakness and headaches.   Hematological:  Negative for adenopathy.   All other systems reviewed and are negative.       Physical Exam  Constitutional:       Appearance: Normal appearance.   HENT:      Head: Normocephalic and atraumatic.      Nose: Nose normal.      Mouth/Throat:      Mouth: Mucous  hemoperitoneum or free intraperitoneal air. Bones: Multilevel lumbar degenerative disc changes with no acute bony abnormality.     1. Cholelithiasis without evidence of cholecystitis. 2. Multifocal increased colonic fecal burden.  Correlate for patient's symptoms of constipation.     ------------------------- NURSING NOTES AND VITALS REVIEWED ---------------------------  Date / Time Roomed:  12/19/2024  9:18 AM  ED Bed Assignment:  0525/0525-A    The nursing notes within the ED encounter and vital signs as below have been reviewed.     Patient Vitals for the past 24 hrs:   BP Temp Temp src Pulse Resp SpO2 Height Weight   12/20/24 0600 (!) 151/64 98.2 °F (36.8 °C) Oral 85 16 97 % -- --   12/19/24 2252 (!) 164/55 98.2 °F (36.8 °C) Oral 100 16 98 % -- --   12/19/24 2228 (!) 194/101 -- -- -- -- -- -- --   12/19/24 2135 (!) 194/76 -- -- -- -- -- -- --   12/19/24 2034 (!) 209/84 98.4 °F (36.9 °C) Oral 83 18 98 % 1.651 m (5' 5\") 81.2 kg (179 lb 1.6 oz)   12/19/24 1956 (!) 171/67 -- -- 80 15 98 % -- --   12/19/24 1856 (!) 172/58 97.9 °F (36.6 °C) -- 80 15 98 % -- --   12/19/24 1704 (!) 150/63 -- -- 72 14 99 % -- --   12/19/24 1354 (!) 150/58 98.1 °F (36.7 °C) Oral 65 18 97 % -- --   12/19/24 1332 (!) 200/76 -- -- -- -- -- -- --   12/19/24 1222 (!) 205/77 97.9 °F (36.6 °C) Oral 60 18 100 % -- --   12/19/24 0916 (!) 145/76 97.9 °F (36.6 °C) Oral 68 18 100 % 1.651 m (5' 5\") 79.4 kg (175 lb)       Oxygen Saturation Interpretation: Normal    ------------------------------------------ PROGRESS NOTES ------------------------------------------  Counseling:  I have spoken with the patient and discussed today’s results, in addition to providing specific details for the plan of care and counseling regarding the diagnosis and prognosis.  Their questions are answered at this time and they are agreeable with the plan of admission.    --------------------------------- ADDITIONAL PROVIDER NOTES ---------------------------------  This

## 2024-12-19 NOTE — PROGRESS NOTES
Database initiated. Patient is A&O independent from home alone. States she uses no assistive devices and is RA at baseline.

## 2024-12-20 ENCOUNTER — APPOINTMENT (OUTPATIENT)
Dept: NUCLEAR MEDICINE | Age: 89
End: 2024-12-20
Attending: HOSPITALIST
Payer: MEDICARE

## 2024-12-20 ENCOUNTER — APPOINTMENT (OUTPATIENT)
Age: 89
End: 2024-12-20
Attending: HOSPITALIST
Payer: MEDICARE

## 2024-12-20 LAB
ANION GAP SERPL CALCULATED.3IONS-SCNC: 11 MMOL/L (ref 7–16)
B PARAP IS1001 DNA NPH QL NAA+NON-PROBE: NOT DETECTED
B PERT DNA SPEC QL NAA+PROBE: NOT DETECTED
BASOPHILS # BLD: 0.04 K/UL (ref 0–0.2)
BASOPHILS NFR BLD: 0 % (ref 0–2)
BUN SERPL-MCNC: 18 MG/DL (ref 6–23)
C PNEUM DNA NPH QL NAA+NON-PROBE: NOT DETECTED
CALCIUM SERPL-MCNC: 9.7 MG/DL (ref 8.6–10.2)
CHLORIDE SERPL-SCNC: 98 MMOL/L (ref 98–107)
CO2 SERPL-SCNC: 24 MMOL/L (ref 22–29)
CREAT SERPL-MCNC: 0.9 MG/DL (ref 0.5–1)
ECHO BSA: 1.93 M2
EOSINOPHIL # BLD: 0.03 K/UL (ref 0.05–0.5)
EOSINOPHILS RELATIVE PERCENT: 0 % (ref 0–6)
ERYTHROCYTE [DISTWIDTH] IN BLOOD BY AUTOMATED COUNT: 14 % (ref 11.5–15)
FLUAV RNA NPH QL NAA+NON-PROBE: NOT DETECTED
FLUBV RNA NPH QL NAA+NON-PROBE: NOT DETECTED
GFR, ESTIMATED: 60 ML/MIN/1.73M2
GLUCOSE SERPL-MCNC: 122 MG/DL (ref 74–99)
HADV DNA NPH QL NAA+NON-PROBE: NOT DETECTED
HCOV 229E RNA NPH QL NAA+NON-PROBE: NOT DETECTED
HCOV HKU1 RNA NPH QL NAA+NON-PROBE: NOT DETECTED
HCOV NL63 RNA NPH QL NAA+NON-PROBE: NOT DETECTED
HCOV OC43 RNA NPH QL NAA+NON-PROBE: NOT DETECTED
HCT VFR BLD AUTO: 39.3 % (ref 34–48)
HGB BLD-MCNC: 13.4 G/DL (ref 11.5–15.5)
HMPV RNA NPH QL NAA+NON-PROBE: NOT DETECTED
HPIV1 RNA NPH QL NAA+NON-PROBE: NOT DETECTED
HPIV2 RNA NPH QL NAA+NON-PROBE: NOT DETECTED
HPIV3 RNA NPH QL NAA+NON-PROBE: NOT DETECTED
HPIV4 RNA NPH QL NAA+NON-PROBE: NOT DETECTED
IMM GRANULOCYTES # BLD AUTO: 0.04 K/UL (ref 0–0.58)
IMM GRANULOCYTES NFR BLD: 0 % (ref 0–5)
LIPASE SERPL-CCNC: 30 U/L (ref 13–60)
LYMPHOCYTES NFR BLD: 1.45 K/UL (ref 1.5–4)
LYMPHOCYTES RELATIVE PERCENT: 13 % (ref 20–42)
M PNEUMO DNA NPH QL NAA+NON-PROBE: NOT DETECTED
MCH RBC QN AUTO: 30.4 PG (ref 26–35)
MCHC RBC AUTO-ENTMCNC: 34.1 G/DL (ref 32–34.5)
MCV RBC AUTO: 89.1 FL (ref 80–99.9)
MONOCYTES NFR BLD: 0.88 K/UL (ref 0.1–0.95)
MONOCYTES NFR BLD: 8 % (ref 2–12)
NEUTROPHILS NFR BLD: 78 % (ref 43–80)
NEUTS SEG NFR BLD: 8.73 K/UL (ref 1.8–7.3)
NUC STRESS EJECTION FRACTION: 72 %
PLATELET # BLD AUTO: 251 K/UL (ref 130–450)
PMV BLD AUTO: 10.2 FL (ref 7–12)
POTASSIUM SERPL-SCNC: 4.1 MMOL/L (ref 3.5–5)
RBC # BLD AUTO: 4.41 M/UL (ref 3.5–5.5)
RSV RNA NPH QL NAA+NON-PROBE: NOT DETECTED
RV+EV RNA NPH QL NAA+NON-PROBE: DETECTED
SARS-COV-2 RNA NPH QL NAA+NON-PROBE: NOT DETECTED
SODIUM SERPL-SCNC: 133 MMOL/L (ref 132–146)
SPECIMEN DESCRIPTION: ABNORMAL
STRESS BASELINE DIAS BP: 70 MMHG
STRESS BASELINE HR: 83 BPM
STRESS BASELINE SYS BP: 189 MMHG
STRESS ESTIMATED WORKLOAD: 1 METS
STRESS PEAK DIAS BP: 70 MMHG
STRESS PEAK SYS BP: 189 MMHG
STRESS PERCENT HR ACHIEVED: 77 %
STRESS POST PEAK HR: 102 BPM
STRESS RATE PRESSURE PRODUCT: NORMAL BPM*MMHG
STRESS TARGET HR: 133 BPM
WBC OTHER # BLD: 11.2 K/UL (ref 4.5–11.5)

## 2024-12-20 PROCEDURE — 85025 COMPLETE CBC W/AUTO DIFF WBC: CPT

## 2024-12-20 PROCEDURE — 0202U NFCT DS 22 TRGT SARS-COV-2: CPT

## 2024-12-20 PROCEDURE — 6370000000 HC RX 637 (ALT 250 FOR IP): Performed by: INTERNAL MEDICINE

## 2024-12-20 PROCEDURE — 80048 BASIC METABOLIC PNL TOTAL CA: CPT

## 2024-12-20 PROCEDURE — 3430000000 HC RX DIAGNOSTIC RADIOPHARMACEUTICAL: Performed by: RADIOLOGY

## 2024-12-20 PROCEDURE — 96372 THER/PROPH/DIAG INJ SC/IM: CPT

## 2024-12-20 PROCEDURE — 78452 HT MUSCLE IMAGE SPECT MULT: CPT

## 2024-12-20 PROCEDURE — 6360000002 HC RX W HCPCS: Performed by: HOSPITALIST

## 2024-12-20 PROCEDURE — 6370000000 HC RX 637 (ALT 250 FOR IP): Performed by: HOSPITALIST

## 2024-12-20 PROCEDURE — 6360000002 HC RX W HCPCS

## 2024-12-20 PROCEDURE — 78452 HT MUSCLE IMAGE SPECT MULT: CPT | Performed by: INTERNAL MEDICINE

## 2024-12-20 PROCEDURE — 99232 SBSQ HOSP IP/OBS MODERATE 35: CPT | Performed by: INTERNAL MEDICINE

## 2024-12-20 PROCEDURE — A9500 TC99M SESTAMIBI: HCPCS | Performed by: RADIOLOGY

## 2024-12-20 PROCEDURE — 83690 ASSAY OF LIPASE: CPT

## 2024-12-20 PROCEDURE — 93018 CV STRESS TEST I&R ONLY: CPT | Performed by: INTERNAL MEDICINE

## 2024-12-20 PROCEDURE — 93017 CV STRESS TEST TRACING ONLY: CPT

## 2024-12-20 PROCEDURE — G0378 HOSPITAL OBSERVATION PER HR: HCPCS

## 2024-12-20 PROCEDURE — 93016 CV STRESS TEST SUPVJ ONLY: CPT | Performed by: INTERNAL MEDICINE

## 2024-12-20 PROCEDURE — 2500000003 HC RX 250 WO HCPCS: Performed by: HOSPITALIST

## 2024-12-20 RX ORDER — HYDRALAZINE HYDROCHLORIDE 25 MG/1
25 TABLET, FILM COATED ORAL EVERY 8 HOURS SCHEDULED
Qty: 90 TABLET | Refills: 0 | Status: SHIPPED | OUTPATIENT
Start: 2024-12-20 | End: 2024-12-20 | Stop reason: HOSPADM

## 2024-12-20 RX ORDER — HYDRALAZINE HYDROCHLORIDE 25 MG/1
25 TABLET, FILM COATED ORAL 3 TIMES DAILY PRN
Status: DISCONTINUED | OUTPATIENT
Start: 2024-12-20 | End: 2024-12-21 | Stop reason: HOSPADM

## 2024-12-20 RX ORDER — LOSARTAN POTASSIUM AND HYDROCHLOROTHIAZIDE 12.5; 1 MG/1; MG/1
1 TABLET ORAL NIGHTLY
Qty: 30 TABLET | Refills: 0
Start: 2024-12-20 | End: 2025-01-19

## 2024-12-20 RX ORDER — HYDRALAZINE HYDROCHLORIDE 50 MG/1
50 TABLET, FILM COATED ORAL EVERY 8 HOURS SCHEDULED
Status: DISCONTINUED | OUTPATIENT
Start: 2024-12-20 | End: 2024-12-20

## 2024-12-20 RX ORDER — HYDROCHLOROTHIAZIDE 25 MG/1
25 TABLET ORAL DAILY
Status: DISCONTINUED | OUTPATIENT
Start: 2024-12-20 | End: 2024-12-21 | Stop reason: HOSPADM

## 2024-12-20 RX ORDER — REGADENOSON 0.08 MG/ML
0.4 INJECTION, SOLUTION INTRAVENOUS
Status: COMPLETED | OUTPATIENT
Start: 2024-12-20 | End: 2024-12-20

## 2024-12-20 RX ORDER — TETRAKIS(2-METHOXYISOBUTYLISOCYANIDE)COPPER(I) TETRAFLUOROBORATE 1 MG/ML
30 INJECTION, POWDER, LYOPHILIZED, FOR SOLUTION INTRAVENOUS
Status: COMPLETED | OUTPATIENT
Start: 2024-12-20 | End: 2024-12-20

## 2024-12-20 RX ORDER — TETRAKIS(2-METHOXYISOBUTYLISOCYANIDE)COPPER(I) TETRAFLUOROBORATE 1 MG/ML
10 INJECTION, POWDER, LYOPHILIZED, FOR SOLUTION INTRAVENOUS
Status: COMPLETED | OUTPATIENT
Start: 2024-12-20 | End: 2024-12-20

## 2024-12-20 RX ADMIN — ENOXAPARIN SODIUM 40 MG: 100 INJECTION SUBCUTANEOUS at 11:17

## 2024-12-20 RX ADMIN — HYDROCHLOROTHIAZIDE 25 MG: 25 TABLET ORAL at 11:21

## 2024-12-20 RX ADMIN — METOPROLOL SUCCINATE 50 MG: 50 TABLET, EXTENDED RELEASE ORAL at 22:10

## 2024-12-20 RX ADMIN — REGADENOSON 0.4 MG: 0.08 INJECTION, SOLUTION INTRAVENOUS at 09:36

## 2024-12-20 RX ADMIN — AMITRIPTYLINE HYDROCHLORIDE 25 MG: 25 TABLET, FILM COATED ORAL at 22:10

## 2024-12-20 RX ADMIN — CALCIUM 500 MG: 500 TABLET ORAL at 22:11

## 2024-12-20 RX ADMIN — SODIUM CHLORIDE, PRESERVATIVE FREE 10 ML: 5 INJECTION INTRAVENOUS at 22:11

## 2024-12-20 RX ADMIN — LEVOTHYROXINE SODIUM 75 MCG: 75 TABLET ORAL at 06:04

## 2024-12-20 RX ADMIN — Medication 2000 UNITS: at 11:16

## 2024-12-20 RX ADMIN — ACETAMINOPHEN 650 MG: 325 TABLET ORAL at 11:15

## 2024-12-20 RX ADMIN — SODIUM CHLORIDE, PRESERVATIVE FREE 10 ML: 5 INJECTION INTRAVENOUS at 11:18

## 2024-12-20 RX ADMIN — ATORVASTATIN CALCIUM 20 MG: 20 TABLET, FILM COATED ORAL at 22:10

## 2024-12-20 RX ADMIN — LOSARTAN POTASSIUM 100 MG: 50 TABLET, FILM COATED ORAL at 11:16

## 2024-12-20 RX ADMIN — Medication 30 MILLICURIE: at 08:31

## 2024-12-20 RX ADMIN — ISOSORBIDE MONONITRATE 30 MG: 30 TABLET, EXTENDED RELEASE ORAL at 11:16

## 2024-12-20 RX ADMIN — Medication 10 MILLICURIE: at 08:31

## 2024-12-20 RX ADMIN — GABAPENTIN 300 MG: 300 CAPSULE ORAL at 14:44

## 2024-12-20 RX ADMIN — HYDRALAZINE HYDROCHLORIDE 50 MG: 50 TABLET ORAL at 12:32

## 2024-12-20 RX ADMIN — GABAPENTIN 300 MG: 300 CAPSULE ORAL at 22:10

## 2024-12-20 NOTE — PLAN OF CARE
Problem: ABCDS Injury Assessment  Goal: Absence of physical injury  Outcome: Progressing     Problem: Discharge Planning  Goal: Discharge to home or other facility with appropriate resources  Recent Flowsheet Documentation  Taken 12/19/2024 2034 by Anuradha Gao  Discharge to home or other facility with appropriate resources: Identify barriers to discharge with patient and caregiver     Problem: Chronic Conditions and Co-morbidities  Goal: Patient's chronic conditions and co-morbidity symptoms are monitored and maintained or improved  Recent Flowsheet Documentation  Taken 12/19/2024 2034 by Anuradha Gao  Care Plan - Patient's Chronic Conditions and Co-Morbidity Symptoms are Monitored and Maintained or Improved: Monitor and assess patient's chronic conditions and comorbid symptoms for stability, deterioration, or improvement

## 2024-12-20 NOTE — H&P
Maternal Grandfather     Cancer Paternal Uncle         unknown       No pertinent family medical history with regard to current issues.    Social History  Patient lives at home alone.   Illicit drug use- denies  TOBACCO:   reports that she has never smoked. She has never used smokeless tobacco.  ETOH:   reports no history of alcohol use.    Home Medications  Prior to Admission medications    Medication Sig Start Date End Date Taking? Authorizing Provider   hydrALAZINE (APRESOLINE) 25 MG tablet Take 1 tablet by mouth every 8 hours 12/20/24 1/19/25 Yes Chaz Oleary MD   losartan-hydroCHLOROthiazide (HYZAAR) 100-12.5 MG per tablet Take 1 tablet by mouth nightly 12/20/24 1/19/25 Yes Chaz Oleary MD   atorvastatin (LIPITOR) 20 MG tablet Take 1 tablet by mouth every evening   Yes Latosha Guzman MD   docusate sodium (COLACE) 100 MG capsule Take 1 capsule by mouth daily as needed for Constipation   Yes Latosha Guzman MD   calcium carbonate 600 MG TABS tablet Take 1 tablet by mouth 2 times daily   Yes Latosha Guzman MD   gabapentin (NEURONTIN) 300 MG capsule Take 1 capsule by mouth 3 times daily for 180 days. 10/30/24 4/28/25 Yes Jose Moore MD   isosorbide mononitrate (IMDUR) 30 MG extended release tablet Take 1 tablet by mouth daily 1/4/24  Yes Antony Malcolm MD   metoprolol succinate (TOPROL XL) 50 MG extended release tablet Take 1 tablet by mouth 2 times daily 1/4/24  Yes Antony Malcolm MD   Vibegron (GEMTESA) 75 MG TABS Take 1 tablet by mouth every morning   Yes Latosha Guzman MD   vitamin D (CHOLECALCIFEROL) 1000 UNIT TABS tablet Take 2 tablets by mouth every morning Takes two tabs daily   Yes Latosha Guzman MD   levothyroxine (SYNTHROID) 75 MCG tablet Take 1 tablet by mouth every morning (before breakfast)   Yes Latosha Guzman MD   amitriptyline (ELAVIL) 25 MG tablet Take 1 tablet by mouth nightly   Yes Latosha Guzman MD       Allergies  Allergies   Allergen  EOMI  Ears: no obvious scars, no lesions, no masses, hearing intact  Mouth: mucous membranes moist, no obvious oral sores  Head: normocephalic, atraumatic  Neck: no JVD, no adenopathy, no thyromegaly, neck is supple, trachea is midline  Back: ROM normal, no CVA tenderness.  Chest: no pain on palpation  Lungs: clear to auscultation bilaterally, without rhonchi, crackle, wheezing, or rale, no retractions or use of accessory muscles  Heart: regular rate and regular rhythm, no murmur, normal S1, S2  Abdomen: soft, non-tender; bowel sounds normal; no masses, no organomegaly  : Deferred   Extremities: no lower extremity edema, extremities atraumatic, no cyanosis, no clubbing, 2+ pedal pulses palpated  Skin: normal color, normal texture, normal turgor, no rashes, no lesions  Neurologic:5/5 muscle strength throughout, normal muscle tone throughout, face symmetric, hearing intact, tongue midline, speech appropriate without slurring, sensation to fine touch intact in upper and lower extremities    Labs-   Lab Results   Component Value Date    WBC 11.2 12/20/2024    HGB 13.4 12/20/2024    HCT 39.3 12/20/2024     12/20/2024     12/20/2024    K 4.1 12/20/2024    CL 98 12/20/2024    CREATININE 0.9 12/20/2024    BUN 18 12/20/2024    CO2 24 12/20/2024    GLUCOSE 122 (H) 12/20/2024    ALT 13 10/16/2024    AST 22 10/16/2024    INR 1.0 08/10/2018    APTT 32.3 02/07/2014     Lab Results   Component Value Date    CKTOTAL 83 03/05/2021    CKMB 1.4 02/19/2020    TROPONINI <0.01 02/20/2020     Narrative:       Stress Test: A pharmacological stress test was performed using  regadenoson (Lexiscan). PO caffeine given as a reversal agent. The patient  reported headaches and no chest pain during the stress test.    ECG: The stress ECG was negative for ischemia.    Stress Combined Conclusion: Normal pharmacological myocardial perfusion  study. Findings suggest a low risk of cardiac events.    Stress Function: Left ventricular

## 2024-12-20 NOTE — ACP (ADVANCE CARE PLANNING)
12/20/2024  Advance Care Planning   Healthcare Decision Maker:  [phone]  David Urias-[phone]    Click here to complete Healthcare Decision Makers including selection of the Healthcare Decision Maker Relationship (ie \"Primary\").

## 2024-12-20 NOTE — PLAN OF CARE
Problem: ABCDS Injury Assessment  Goal: Absence of physical injury  12/20/2024 1205 by Gloria Hernandez RN  Outcome: Progressing  12/19/2024 2305 by Anuradha Gao  Outcome: Progressing     Problem: Chronic Conditions and Co-morbidities  Goal: Patient's chronic conditions and co-morbidity symptoms are monitored and maintained or improved  Outcome: Progressing

## 2024-12-20 NOTE — PROGRESS NOTES
Notified pt concerns with sore throat and daughter recently testing positive for covid, new orders placed

## 2024-12-20 NOTE — PROGRESS NOTES
Results   Component Value Date    TSH 0.05 (L) 04/17/2024     Lab Results   Component Value Date    LABA1C 5.9 (H) 03/05/2021     No results found for: \"EAG\"  Lab Results   Component Value Date    CHOL 122 04/17/2024    CHOL 129 01/05/2024    CHOL 145 09/07/2022     Lab Results   Component Value Date    TRIG 126 04/17/2024    TRIG 130 01/05/2024    TRIG 131 09/07/2022     Lab Results   Component Value Date    HDL 53 04/17/2024    HDL 63 01/05/2024    HDL 56 09/07/2022     No components found for: \"LDLCALC\", \"LDLCHOLESTEROL\"  Lab Results   Component Value Date    VLDL 25 04/17/2024    VLDL 26 01/05/2024    VLDL 26 09/07/2022     No results found for: \"CHOLHDLRATIO\"  No results for input(s): \"PROBNP\" in the last 72 hours.    Cardiac Tests:    Cardiac testing:  TTE 2/2020: EF 60 to 65%.  NWMA.  Normal RV size and function.  Intra-atrial septum intact.  No VHD.  NM stress 8/2021: Perfusion imaging normal.  EF 89%.  Low risk.     Undergoing nuclear perfusion scan today     Assessment:  Atypical chest pain in setting of hypertensive urgency.  Mildly elevated, downtrending troponin presentation 22 > 14.  EKG unchanged from previous.  Hypertensive urgency, patient reporting SBP >200 at home.  Continued hypertension while in ER.  History of symptomatic palpitations  Hyperlipidemia, on Lipitor  Hypothyroidism, on HRT  TIA  Tinnitus  Neuropathy bilateral lower extremities  History of migraines  Arthritis  Osteopenia  Fibrocystic breasts s/p bilateral mastectomy     Plan:  Nuclear perfusion scan today  Change Imdur to 30 mg twice daily dosing.  Started on Norvasc 5 mg daily  As needed hydralazine 10 mg IV every 6 hours for SBP >170.  Continue Lipitor, losartan/HCTZ, and Toprol.  Increase HCTZ to 25 mg daily  Telemetry monitoring while inpatient.  Rest per primary service other consultants.  Pain features are atypical.  Does have some risk factors.  Presentation likely secondary to hypertensive urgency.  Continues to have some  pain despite better pressures.  Should nuclear perfusion scan be within normal limits, she should be stable for discharge on current medications.  Pressures are doing better.  Does have low diastolic pressures  Sonny Barrett MD, [facility]    NOTE: This report was transcribed using voice recognition software. Every effort was made to ensure accuracy; however, inadvertent computerized transcription errors may be present.

## 2024-12-20 NOTE — CARE COORDINATION
12/20/2024  Social Work Discharge Planning:OBS. Chest pain. This worker met with Pt and her son to discuss  role and transition of care/discharge planning.Pt is independent from home and uses no DME. Awaiting stress test results. No needs at this time. Son will transport Pt home at discharge. Room air.Electronically signed by SIVA Moreno on 12/20/2024 at 1:05 PM

## 2024-12-20 NOTE — ED NOTES
ED to Inpatient Handoff Report    Notified Darlene that electronic handoff available and patient ready for transport to room 525.    Safety Risks: Risk of falls    Patient in Restraints: no    Constant Observer or Patient : no    Telemetry Monitoring Ordered: Yes          Order to transfer to unit without monitor: NO    Last MEWS: 1 Time completed: 1856    Deterioration Index: 24.59    Vitals:    12/19/24 1332 12/19/24 1354 12/19/24 1704 12/19/24 1856   BP: (!) 200/76 (!) 150/58 (!) 150/63 (!) 172/58   Pulse:  65 72 80   Resp:  18 14 15   Temp:  98.1 °F (36.7 °C)  97.9 °F (36.6 °C)   TempSrc:  Oral     SpO2:  97% 99% 98%   Weight:       Height:           Opportunity for questions and clarification was provided.

## 2024-12-20 NOTE — PROGRESS NOTES
4 Eyes Skin Assessment     NAME:  Ama Urias  YOB: 1935  MEDICAL RECORD NUMBER:  [id number]    The patient is being assessed for  Admission    I agree that at least one RN has performed a thorough Head to Toe Skin Assessment on the patient. ALL assessment sites listed below have been assessed.      Areas assessed by both nurses:    Head, Face, Ears, Shoulders, Back, Chest, Arms, Elbows, Hands, Sacrum. Buttock, Coccyx, Ischium, Legs. Feet and Heels, and Under Medical Devices         Does the Patient have a Wound? No noted wound(s)       Noah Prevention initiated by RN: No  Wound Care Orders initiated by RN: No    Pressure Injury (Stage 3,4, Unstageable, DTI, NWPT, and Complex wounds) if present, place Wound referral order by RN under : No    New Ostomies, if present place, Ostomy referral order under : No     Nurse 1 eSignature: Electronically signed by Pierre Gao on 12/19/24 at 11:27 PM EST    **SHARE this note so that the co-signing nurse can place an eSignature**    Nurse 2 eSignature: Electronically signed by Darlene Carter RN on 12/19/24 at 11:31 PM EST

## 2024-12-21 VITALS
HEIGHT: 65 IN | HEART RATE: 88 BPM | WEIGHT: 179.1 LBS | SYSTOLIC BLOOD PRESSURE: 114 MMHG | OXYGEN SATURATION: 93 % | TEMPERATURE: 98.2 F | BODY MASS INDEX: 29.84 KG/M2 | RESPIRATION RATE: 16 BRPM | DIASTOLIC BLOOD PRESSURE: 54 MMHG

## 2024-12-21 PROCEDURE — 6360000002 HC RX W HCPCS: Performed by: HOSPITALIST

## 2024-12-21 PROCEDURE — G0378 HOSPITAL OBSERVATION PER HR: HCPCS

## 2024-12-21 PROCEDURE — 2500000003 HC RX 250 WO HCPCS: Performed by: HOSPITALIST

## 2024-12-21 PROCEDURE — 96372 THER/PROPH/DIAG INJ SC/IM: CPT

## 2024-12-21 PROCEDURE — 6370000000 HC RX 637 (ALT 250 FOR IP): Performed by: HOSPITALIST

## 2024-12-21 PROCEDURE — 6370000000 HC RX 637 (ALT 250 FOR IP): Performed by: INTERNAL MEDICINE

## 2024-12-21 RX ADMIN — Medication 2000 UNITS: at 08:09

## 2024-12-21 RX ADMIN — SODIUM CHLORIDE, PRESERVATIVE FREE 10 ML: 5 INJECTION INTRAVENOUS at 08:09

## 2024-12-21 RX ADMIN — ISOSORBIDE MONONITRATE 30 MG: 30 TABLET, EXTENDED RELEASE ORAL at 08:09

## 2024-12-21 RX ADMIN — LEVOTHYROXINE SODIUM 75 MCG: 75 TABLET ORAL at 06:01

## 2024-12-21 RX ADMIN — HYDROCHLOROTHIAZIDE 25 MG: 25 TABLET ORAL at 06:01

## 2024-12-21 RX ADMIN — METOPROLOL SUCCINATE 50 MG: 50 TABLET, EXTENDED RELEASE ORAL at 08:09

## 2024-12-21 RX ADMIN — LOSARTAN POTASSIUM 100 MG: 50 TABLET, FILM COATED ORAL at 08:09

## 2024-12-21 RX ADMIN — GABAPENTIN 300 MG: 300 CAPSULE ORAL at 08:08

## 2024-12-21 RX ADMIN — ENOXAPARIN SODIUM 40 MG: 100 INJECTION SUBCUTANEOUS at 08:08

## 2024-12-21 RX ADMIN — CALCIUM 500 MG: 500 TABLET ORAL at 08:08

## 2024-12-21 NOTE — PROGRESS NOTES
Internal Medicine Progress Note    Patient's name: Ama Urias  : 1935  Chief complaints (on day of admission): Chest Pain (Pt having chest pain for last day and a half not getting better and states that her blood pressure was high this morning )  Admission date: 2024  Date of service: 2024   Room: [address] MED SURG/TELE  Primary care physician: Emanuel Singh MD  Reason for visit: Follow-up for chest pain    Subjective  Ama was seen and examined sitting up in bed. She tells me she is feeling ok. She denies any further chest pain. She denies difficulty breathing on room air. Plan for discharge today.    Review of Systems  There are no new complaints of chest pain, shortness of breath, abdominal pain, nausea, vomiting, diarrhea, constipation.    Hospital Medications  Current Facility-Administered Medications   Medication Dose Route Frequency Provider Last Rate Last Admin    hydroCHLOROthiazide (HYDRODIURIL) tablet 25 mg  25 mg Oral Daily Sonny Barrett MD   25 mg at 24 06    hydrALAZINE (APRESOLINE) tablet 25 mg  25 mg Oral TID PRN Chaz Oleary MD        losartan (COZAAR) tablet 100 mg  100 mg Oral Daily Chza Oleary MD   100 mg at 24 1116    levothyroxine (SYNTHROID) tablet 75 mcg  75 mcg Oral QAM AC Chaz Oleary MD   75 mcg at 24 0601    amitriptyline (ELAVIL) tablet 25 mg  25 mg Oral Nightly Chaz Oleary MD   25 mg at 24 2210    Vitamin D (CHOLECALCIFEROL) tablet 2,000 Units  2,000 Units Oral QAM Chaz Oleary MD   2,000 Units at 24 1116    psyllium (KONSYL) 28.3 % packet 1 packet  1 packet Oral Daily PRN Chaz Oleary MD        atorvastatin (LIPITOR) tablet 20 mg  20 mg Oral Nightly Chaz Oleary MD   20 mg at 24 2210    isosorbide mononitrate (IMDUR) extended release tablet 30 mg  30 mg Oral Daily Chaz Oleary MD   30 mg at 24 1116    metoprolol succinate (TOPROL XL) extended release tablet 50 mg  50 mg Oral BID Chaz Oleary MD   50  Recorded Vitals  /73   Pulse 81   Temp 98.2 °F (36.8 °C)   Resp 16   Ht 1.651 m (5' 5\")   Wt 81.2 kg (179 lb 1.6 oz)   SpO2 93%   BMI 29.80 kg/m²   I/O last 3 completed shifts:  In: 600 [P.O.:600]  Out: 400 [Urine:400]  No intake/output data recorded.    Physical Exam:  General: AAO to person/place/time/purpose, NAD, no labored breathing  Eyes: conjunctivae/corneas clear, sclera non icteric  Skin: color/texture/turgor normal, no rashes or lesions  Lungs: CTAB, no retractions/use of accessory muscles, no vocal fremitus, no rhonchi, no crackle, no rales  Heart: regular rate, regular rhythm, no murmur  Abdomen: soft, NT, bowel sounds normal  Extremities: atraumatic, no edema  Neurologic: cranial nerves 2-12 grossly intact, no slurred speech    Most Recent Labs  Lab Results   Component Value Date    WBC 11.2 12/20/2024    HGB 13.4 12/20/2024    HCT 39.3 12/20/2024     12/20/2024     12/20/2024    K 4.1 12/20/2024    CL 98 12/20/2024    CREATININE 0.9 12/20/2024    BUN 18 12/20/2024    CO2 24 12/20/2024    GLUCOSE 122 (H) 12/20/2024    ALT 13 10/16/2024    AST 22 10/16/2024    INR 1.0 08/10/2018    APTT 32.3 02/07/2014    TSH 0.05 (L) 04/17/2024    LABA1C 5.9 (H) 03/05/2021       XR CHEST PORTABLE   Final Result   No acute cardiopulmonary disease.             Stress test 12/19:    Stress Test: A pharmacological stress test was performed using regadenoson (Lexiscan). PO caffeine given as a reversal agent. The patient reported headaches and no chest pain during the stress test.    ECG: The stress ECG was negative for ischemia.    Stress Combined Conclusion: Normal pharmacological myocardial perfusion study. Findings suggest a low risk of cardiac events.    Stress Function: Left ventricular function post-stress is normal. Post-stress ejection fraction is 72%.    Perfusion Comments: LV perfusion is normal. There is no evidence of inducible ischemia.  Fixed inferior perfusion defect with preserved

## 2024-12-21 NOTE — DISCHARGE SUMMARY
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 female patient of Emanuel Singh MD who  has a past medical history of Abdominal pain, Arthritis, Chest pain, Difficult intubation, Hypertension, Hypothyroidism, Low back pain, Migraine headache, Neuropathy, Osteopenia, Palpitation, Plantar fasciitis, Skipped heart beats, Tingling of both feet, and Tinnitus. who originally had concerns including Chest Pain (Pt having chest pain for last day and a half not getting better and states that her blood pressure was high this morning ). at presentation on 2024, and was found to have Chest pain [R07.9]  Chest pain, unspecified type [R07.9] after workup.    Please see H&P for further details.    HOSPITAL COURSE:   The patient presented to the hospital with the chief complaint of Chest Pain (Pt having chest pain for last day and a half not getting better and states that her blood pressure was high this morning )  . The patient was admitted to the hospital.     Chest pain  Stress test performed and showed no evidence of reversible ischemia  Had a negative stress test 5 or 6 years ago  Likely related to elevated blood pressure  Hypertensive urgency  Patient started on increased dose of Imdur and hydrochlorothiazide  Added hydralazine as well, however, blood pressure dropped too low and we will discontinue that for

## 2024-12-23 ENCOUNTER — TELEPHONE (OUTPATIENT)
Dept: CARDIOLOGY CLINIC | Age: 89
End: 2024-12-23

## 2024-12-24 RX ORDER — METOPROLOL SUCCINATE 50 MG/1
125 TABLET, EXTENDED RELEASE ORAL DAILY
COMMUNITY

## 2024-12-24 NOTE — TELEPHONE ENCOUNTER
Patient notified of Dr. Ramsay's assessment/recommendations. Med list amended.  BP/P check scheduled for 1/7/25 at 2:00 p.m.

## 2024-12-24 NOTE — TELEPHONE ENCOUNTER
These are getting better but can increase the morning Toprol to 75 mg.  Blood pressure and heart rate check in 2 weeks.

## 2025-01-07 ENCOUNTER — TELEPHONE (OUTPATIENT)
Dept: CARDIOLOGY CLINIC | Age: 89
End: 2025-01-07

## 2025-01-07 ENCOUNTER — NURSE ONLY (OUTPATIENT)
Dept: CARDIOLOGY CLINIC | Age: 89
End: 2025-01-07

## 2025-01-07 RX ORDER — METOPROLOL SUCCINATE 100 MG/1
150 TABLET, EXTENDED RELEASE ORAL DAILY
COMMUNITY

## 2025-01-07 NOTE — PROGRESS NOTES
Patient was seen in the office today for a blood pressure check per     Standing BP:136/62  Standing P:80    Sitting BP:132/60  Sitting P:65

## 2025-01-07 NOTE — TELEPHONE ENCOUNTER
Increase Toprol from 75 mg in the a.m. and 50 mg in the p.m. to 100 mg in the a.m. and 50 mg in the p.m.  Blood pressure and heart rate check in 2 weeks for Dr. Malcolm to review.

## 2025-01-07 NOTE — TELEPHONE ENCOUNTER
Patient notified of Dr. Ramsay's recommendations.  Med list amended.  Patient is scheduled to see Dr. Malcolm on 1/17/25, we will check BP/P at that time.

## 2025-01-07 NOTE — TELEPHONE ENCOUNTER
Patient was seen in the office today for a blood pressure check per      Standing BP:136/62  Standing P:80     Sitting BP:132/60  Sitting P:65            Electronically signed by Theresa Hollins at 1/7/2025  1:51 PM    Toprol morning dose increased to 75 mg on 12/23/24.

## 2025-01-15 ENCOUNTER — TELEPHONE (OUTPATIENT)
Dept: CARDIOLOGY CLINIC | Age: 89
End: 2025-01-15

## 2025-01-15 RX ORDER — AMLODIPINE BESYLATE 5 MG/1
5 TABLET ORAL DAILY
COMMUNITY
End: 2025-01-15 | Stop reason: CLARIF

## 2025-01-15 RX ORDER — CARVEDILOL 12.5 MG/1
12.5 TABLET ORAL 2 TIMES DAILY WITH MEALS
COMMUNITY
End: 2025-01-15 | Stop reason: SDUPTHER

## 2025-01-15 NOTE — TELEPHONE ENCOUNTER
Attempted to e-scribed Norvasc, patient tried and had swelling previously.  Per verbal from Dr. Malcolm, just make the change to Coreg and call on Monday.  Patient notified.  Med list amended.

## 2025-01-15 NOTE — TELEPHONE ENCOUNTER
Nicole from Dr. Singh's office called stating patient's BP/P today is.170/82 (68).  Dr. Singh is deferring to Dr. Malcolm.

## 2025-01-15 NOTE — TELEPHONE ENCOUNTER
Change metoprolol to carvedilol 12.5 mg p.o. twice daily and add amlodipine 5 mg p.o. daily.  Please asked the patient to monitor blood pressure and heart rate daily and call on Monday.

## 2025-01-15 NOTE — TELEPHONE ENCOUNTER
Patient notified of Dr. Malcolm's recommendations.  Med list amended.  Coreg and Norvasc e-scribed.

## 2025-01-16 LAB
25(OH)D3 SERPL-MCNC: 32.7 NG/ML (ref 30–100)
ALBUMIN SERPL-MCNC: 4 G/DL (ref 3.5–5.2)
ALP SERPL-CCNC: 101 U/L (ref 35–104)
ALT SERPL-CCNC: 15 U/L (ref 0–32)
ANION GAP SERPL CALCULATED.3IONS-SCNC: 12 MMOL/L (ref 7–16)
AST SERPL-CCNC: 22 U/L (ref 0–31)
BACTERIA URNS QL MICRO: ABNORMAL
BASOPHILS # BLD: 0.05 K/UL (ref 0–0.2)
BASOPHILS NFR BLD: 1 % (ref 0–2)
BILIRUB SERPL-MCNC: 0.8 MG/DL (ref 0–1.2)
BILIRUB UR QL STRIP: NEGATIVE
BUN SERPL-MCNC: 17 MG/DL (ref 6–23)
CALCIUM SERPL-MCNC: 9.2 MG/DL (ref 8.6–10.2)
CHLORIDE SERPL-SCNC: 100 MMOL/L (ref 98–107)
CHOLEST SERPL-MCNC: 136 MG/DL
CLARITY UR: CLEAR
CO2 SERPL-SCNC: 25 MMOL/L (ref 22–29)
COLOR UR: YELLOW
CREAT SERPL-MCNC: 1 MG/DL (ref 0.5–1)
EOSINOPHIL # BLD: 0.21 K/UL (ref 0.05–0.5)
EOSINOPHILS RELATIVE PERCENT: 3 % (ref 0–6)
EPI CELLS #/AREA URNS HPF: ABNORMAL /HPF
ERYTHROCYTE [DISTWIDTH] IN BLOOD BY AUTOMATED COUNT: 14.4 % (ref 11.5–15)
GFR, ESTIMATED: 53 ML/MIN/1.73M2
GLUCOSE SERPL-MCNC: 102 MG/DL (ref 74–99)
GLUCOSE UR STRIP-MCNC: NEGATIVE MG/DL
HBA1C MFR BLD: 5.8 % (ref 4–5.6)
HCT VFR BLD AUTO: 38.7 % (ref 34–48)
HDLC SERPL-MCNC: 51 MG/DL
HGB BLD-MCNC: 12.7 G/DL (ref 11.5–15.5)
HGB UR QL STRIP.AUTO: NEGATIVE
IMM GRANULOCYTES # BLD AUTO: <0.03 K/UL (ref 0–0.58)
IMM GRANULOCYTES NFR BLD: 0 % (ref 0–5)
IRON SATN MFR SERPL: 36 % (ref 15–50)
IRON SERPL-MCNC: 101 UG/DL (ref 37–145)
KETONES UR STRIP-MCNC: NEGATIVE MG/DL
LDLC SERPL CALC-MCNC: 56 MG/DL
LEUKOCYTE ESTERASE UR QL STRIP: ABNORMAL
LYMPHOCYTES NFR BLD: 2.05 K/UL (ref 1.5–4)
LYMPHOCYTES RELATIVE PERCENT: 31 % (ref 20–42)
MCH RBC QN AUTO: 30.5 PG (ref 26–35)
MCHC RBC AUTO-ENTMCNC: 32.8 G/DL (ref 32–34.5)
MCV RBC AUTO: 93 FL (ref 80–99.9)
MONOCYTES NFR BLD: 0.66 K/UL (ref 0.1–0.95)
MONOCYTES NFR BLD: 10 % (ref 2–12)
MUCOUS THREADS URNS QL MICRO: PRESENT
NEUTROPHILS NFR BLD: 56 % (ref 43–80)
NEUTS SEG NFR BLD: 3.75 K/UL (ref 1.8–7.3)
NITRITE UR QL STRIP: NEGATIVE
PH UR STRIP: 7 [PH] (ref 5–9)
PLATELET # BLD AUTO: 231 K/UL (ref 130–450)
PMV BLD AUTO: 11.1 FL (ref 7–12)
POTASSIUM SERPL-SCNC: 4.4 MMOL/L (ref 3.5–5)
PROT SERPL-MCNC: 6.8 G/DL (ref 6.4–8.3)
PROT UR STRIP-MCNC: NEGATIVE MG/DL
RBC # BLD AUTO: 4.16 M/UL (ref 3.5–5.5)
RBC #/AREA URNS HPF: ABNORMAL /HPF
SODIUM SERPL-SCNC: 137 MMOL/L (ref 132–146)
SP GR UR STRIP: 1.01 (ref 1–1.03)
T4 FREE SERPL-MCNC: 1.3 NG/DL (ref 0.9–1.7)
TIBC SERPL-MCNC: 279 UG/DL (ref 250–450)
TRIGL SERPL-MCNC: 144 MG/DL
TSH SERPL DL<=0.05 MIU/L-ACNC: 0.33 UIU/ML (ref 0.27–4.2)
URATE SERPL-MCNC: 5.2 MG/DL (ref 2.4–5.7)
UROBILINOGEN UR STRIP-ACNC: 0.2 EU/DL (ref 0–1)
VLDLC SERPL CALC-MCNC: 29 MG/DL
WBC #/AREA URNS HPF: ABNORMAL /HPF
WBC OTHER # BLD: 6.7 K/UL (ref 4.5–11.5)

## 2025-01-16 RX ORDER — CARVEDILOL 12.5 MG/1
12.5 TABLET ORAL 2 TIMES DAILY WITH MEALS
Qty: 60 TABLET | Refills: 11 | Status: SHIPPED | OUTPATIENT
Start: 2025-01-16

## 2025-01-17 ENCOUNTER — OFFICE VISIT (OUTPATIENT)
Dept: CARDIOLOGY CLINIC | Age: 89
End: 2025-01-17
Payer: MEDICARE

## 2025-01-17 VITALS
RESPIRATION RATE: 18 BRPM | TEMPERATURE: 96.4 F | DIASTOLIC BLOOD PRESSURE: 50 MMHG | OXYGEN SATURATION: 97 % | HEART RATE: 59 BPM | HEIGHT: 65 IN | WEIGHT: 180 LBS | BODY MASS INDEX: 29.99 KG/M2 | SYSTOLIC BLOOD PRESSURE: 110 MMHG

## 2025-01-17 DIAGNOSIS — E78.5 HYPERLIPIDEMIA, UNSPECIFIED HYPERLIPIDEMIA TYPE: Primary | ICD-10-CM

## 2025-01-17 LAB — ERYTHROCYTE [SEDIMENTATION RATE] IN BLOOD BY WESTERGREN METHOD: 17 MM/HR (ref 0–20)

## 2025-01-17 PROCEDURE — 1159F MED LIST DOCD IN RCRD: CPT | Performed by: INTERNAL MEDICINE

## 2025-01-17 PROCEDURE — 99214 OFFICE O/P EST MOD 30 MIN: CPT | Performed by: INTERNAL MEDICINE

## 2025-01-17 PROCEDURE — 1123F ACP DISCUSS/DSCN MKR DOCD: CPT | Performed by: INTERNAL MEDICINE

## 2025-01-17 PROCEDURE — 93000 ELECTROCARDIOGRAM COMPLETE: CPT | Performed by: INTERNAL MEDICINE

## 2025-01-17 RX ORDER — LOSARTAN POTASSIUM AND HYDROCHLOROTHIAZIDE 12.5; 1 MG/1; MG/1
1 TABLET ORAL NIGHTLY
Qty: 90 TABLET | Refills: 3 | Status: SHIPPED | OUTPATIENT
Start: 2025-01-17

## 2025-01-17 RX ORDER — PANTOPRAZOLE SODIUM 40 MG/1
TABLET, DELAYED RELEASE ORAL
COMMUNITY
Start: 2025-01-16

## 2025-01-17 RX ORDER — ISOSORBIDE MONONITRATE 30 MG/1
30 TABLET, EXTENDED RELEASE ORAL DAILY
Qty: 90 TABLET | Refills: 3 | Status: SHIPPED | OUTPATIENT
Start: 2025-01-17

## 2025-01-17 RX ORDER — ATORVASTATIN CALCIUM 20 MG/1
20 TABLET, FILM COATED ORAL EVERY EVENING
Qty: 90 TABLET | Refills: 3 | Status: SHIPPED | OUTPATIENT
Start: 2025-01-17

## 2025-01-17 RX ORDER — METOPROLOL SUCCINATE 50 MG/1
50 TABLET, EXTENDED RELEASE ORAL DAILY
COMMUNITY
End: 2025-01-17 | Stop reason: ALTCHOICE

## 2025-01-17 NOTE — PROGRESS NOTES
OUTPATIENT CARDIOLOGY FOLLOW-UP    Name: Ama Urias    Age: 89 y.o.    Primary Care Physician: Emanuel Singh MD    Date of Service: 1/17/2025    Chief Complaint:   Chief Complaint   Patient presents with    Annual Exam       Interim History:   Mrs. Urias is a 89-year-old female history of TIA, symptomatic palpitations, migraines, mild hyperlipidemia, hypothyroidism, history of breast cancer status post the mastectomy and reconstructive surgery is here for follow-up visit. She was seen in the office on 1/4/24. Since her last visit, she was evaluated in the emergency room on 12/13/2024 with abdominal pain and was diagnosed with constipation and was discharged home.  She was hospitalized from 12/19/2024 to 12/21/2020 for with the chest pain and underwent Lexiscan nuclear stress test which showed no ischemia.  Patient also had hypertensive urgency and was initiated on Imdur and hydrochlorothiazide.  Recently, she noted to have blood pressure running high in the 190s.  On the 15th, patient was recommended to discontinue metoprolol and start on carvedilol 12.5 mg p.o. twice daily.  Now her blood pressure is doing well at home it was 130 range.  Today is 110/50.  She felt a bit lightheaded and dizzy after taking the first dose now she is feeling better.    In 2/2024, she underwent left knee arthroplasty without perioperative cardiac events except for blood pressure issues.  She had an ER visit on 4/13/23 with lower abdominal pain and was diagnosed with cholelithiasis and was discharged home and no further episodes.  She is here for follow up visit and also for pre-op evaluation prior to left knee arthroplasty which is scheduled on 2/1/24.      She had a colonoscopy and was noted to have a polyp which was resected.  She has been having swelling of the right leg more than the left without dyspnea, orthopnea, PND.  She is still having chest tightness without palpitations and did not notice any aggravating or

## 2025-01-17 NOTE — PATIENT INSTRUCTIONS
Recent stress test results were reviewed, as above and discussed with patient.  No evidence of ischemia normal perfusion and normal wall motion.  Continue carvedilol 12.5 mg twice a day, Imdur 30 mg po daily for chest pain and hypertension  Continue  losartan hydrochlorothiazide to losartan/HCTZ 100/12.5 mg 1 p.o. daily for hypertension.    Continue atorvastatin 20 mg p.o. daily.  Continue regular exercise   She was advised to continue using compression stockings  Recent lipid panel done on 1/16/2025 were reviewed, LDL 56 at goal level and the rest of the panel showed cholesterol of 136 HDL 51 triglycerides 144.  Follow up with me in 3 months.

## 2025-01-20 ENCOUNTER — TELEPHONE (OUTPATIENT)
Dept: CARDIOLOGY CLINIC | Age: 89
End: 2025-01-20

## 2025-01-20 NOTE — TELEPHONE ENCOUNTER
Patient notified of Dr. Malcolm's recommendations.  However, patient tried Amlodipine in the past and had bilateral LE edema.

## 2025-01-20 NOTE — TELEPHONE ENCOUNTER
Patient called with BP/P readings:     146/61 (59) before meds  165/69 (81) before meds  120/61 (67) after meds  159/78 (86) today before meds  128/61 (85) today after meds

## 2025-01-20 NOTE — TELEPHONE ENCOUNTER
Continue current medications and start on amlodipine 5 mg p.o. at bedtime.  Monitor blood pressure daily and call us in 1 week

## 2025-01-21 RX ORDER — LOSARTAN POTASSIUM AND HYDROCHLOROTHIAZIDE 12.5; 1 MG/1; MG/1
2 TABLET ORAL DAILY
COMMUNITY

## 2025-01-21 RX ORDER — CARVEDILOL 25 MG/1
25 TABLET ORAL 2 TIMES DAILY WITH MEALS
COMMUNITY

## 2025-01-21 NOTE — TELEPHONE ENCOUNTER
Will discontinue amlodipine, increase carvedilol to 25 mg.  Twice daily and change losartan hydrochlorothiazide dose to 200/25 mg p.o. daily.  She can monitor heart rate and blood pressure daily and call us in 1 week.  Please ask her to follow low-salt diet

## 2025-01-28 ENCOUNTER — TELEPHONE (OUTPATIENT)
Dept: CARDIOLOGY CLINIC | Age: 89
End: 2025-01-28

## 2025-01-28 DIAGNOSIS — I10 HYPERTENSION, UNSPECIFIED TYPE: Primary | ICD-10-CM

## 2025-01-28 RX ORDER — LOSARTAN POTASSIUM AND HYDROCHLOROTHIAZIDE 25; 100 MG/1; MG/1
1 TABLET ORAL DAILY
Qty: 90 TABLET | Refills: 1 | Status: SHIPPED | OUTPATIENT
Start: 2025-01-28

## 2025-01-28 NOTE — TELEPHONE ENCOUNTER
Patient called with BP/P readings since increasing Carvedilol to 25 mg BID and increasing Losartan-HCTZ  to 200/25 mg daily on 1/21/25.  The below readings are from after her medications, she states it is higher sometimes right before she takes her evening meds.     118/54 (69)  117/57 (69)  127/59 (68)  126/61 (66)  125/58 (65)  124/49 (72) today, felt dizzy  122/56 (64) recheck today, felt fine

## 2025-01-28 NOTE — TELEPHONE ENCOUNTER
Blood pressures are well-controlled.  She felt dizzy because of the high blood pressure medications.  Please ask her to stay well-hydrated ( 48-64 oz of fluids).  She needs to change losartan hydrochlorothiazide to 100/25 mg p.o. daily (it was a type from dictation),  check BMP tomorrow. I sent new script to [facility].

## 2025-01-29 DIAGNOSIS — I10 HYPERTENSION, UNSPECIFIED TYPE: ICD-10-CM

## 2025-01-29 LAB
ANION GAP SERPL CALCULATED.3IONS-SCNC: 13 MMOL/L (ref 7–16)
BUN BLDV-MCNC: 16 MG/DL (ref 6–23)
CALCIUM SERPL-MCNC: 9.6 MG/DL (ref 8.6–10.2)
CHLORIDE BLD-SCNC: 98 MMOL/L (ref 98–107)
CO2: 28 MMOL/L (ref 22–29)
CREAT SERPL-MCNC: 1.1 MG/DL (ref 0.5–1)
GFR, ESTIMATED: 50 ML/MIN/1.73M2
GLUCOSE BLD-MCNC: 109 MG/DL (ref 74–99)
POTASSIUM SERPL-SCNC: 4.1 MMOL/L (ref 3.5–5)
SODIUM BLD-SCNC: 139 MMOL/L (ref 132–146)

## 2025-01-30 ENCOUNTER — TELEPHONE (OUTPATIENT)
Dept: CARDIOLOGY CLINIC | Age: 89
End: 2025-01-30

## 2025-01-30 NOTE — TELEPHONE ENCOUNTER
----- Message from Dr. Antony Malcolm MD sent at 1/29/2025  9:28 PM EST -----  Blood work showed slightly elevated renal functions, please ask her to stay well-hydrated.

## 2025-01-31 RX ORDER — CARVEDILOL 25 MG/1
25 TABLET ORAL 2 TIMES DAILY WITH MEALS
Qty: 60 TABLET | Refills: 3 | Status: SHIPPED | OUTPATIENT
Start: 2025-01-31

## 2025-04-17 ENCOUNTER — OFFICE VISIT (OUTPATIENT)
Dept: CARDIOLOGY CLINIC | Age: 89
End: 2025-04-17
Payer: MEDICARE

## 2025-04-17 VITALS
SYSTOLIC BLOOD PRESSURE: 116 MMHG | DIASTOLIC BLOOD PRESSURE: 62 MMHG | RESPIRATION RATE: 18 BRPM | OXYGEN SATURATION: 95 % | BODY MASS INDEX: 29.51 KG/M2 | WEIGHT: 177.1 LBS | HEART RATE: 59 BPM | TEMPERATURE: 96.3 F | HEIGHT: 65 IN

## 2025-04-17 DIAGNOSIS — I10 PRIMARY HYPERTENSION: ICD-10-CM

## 2025-04-17 DIAGNOSIS — R07.89 ATYPICAL CHEST PAIN: ICD-10-CM

## 2025-04-17 DIAGNOSIS — E78.5 HYPERLIPIDEMIA, UNSPECIFIED HYPERLIPIDEMIA TYPE: Primary | ICD-10-CM

## 2025-04-17 PROCEDURE — 99214 OFFICE O/P EST MOD 30 MIN: CPT | Performed by: INTERNAL MEDICINE

## 2025-04-17 PROCEDURE — 1159F MED LIST DOCD IN RCRD: CPT | Performed by: INTERNAL MEDICINE

## 2025-04-17 PROCEDURE — G2211 COMPLEX E/M VISIT ADD ON: HCPCS | Performed by: INTERNAL MEDICINE

## 2025-04-17 PROCEDURE — 1123F ACP DISCUSS/DSCN MKR DOCD: CPT | Performed by: INTERNAL MEDICINE

## 2025-04-17 PROCEDURE — 93000 ELECTROCARDIOGRAM COMPLETE: CPT | Performed by: INTERNAL MEDICINE

## 2025-04-17 RX ORDER — ACETAMINOPHEN 325 MG/1
650 TABLET ORAL EVERY 6 HOURS PRN
COMMUNITY

## 2025-04-17 RX ORDER — CARVEDILOL 12.5 MG/1
12.5 TABLET ORAL 2 TIMES DAILY WITH MEALS
Qty: 180 TABLET | Refills: 3 | Status: SHIPPED | OUTPATIENT
Start: 2025-04-17

## 2025-04-17 RX ORDER — LOSARTAN POTASSIUM AND HYDROCHLOROTHIAZIDE 25; 100 MG/1; MG/1
1 TABLET ORAL DAILY
Qty: 90 TABLET | Refills: 3 | Status: SHIPPED | OUTPATIENT
Start: 2025-04-17

## 2025-04-17 NOTE — PROGRESS NOTES
OUTPATIENT CARDIOLOGY FOLLOW-UP    Name: Ama Urias    Age: 89 y.o.    Primary Care Physician: Emanuel Singh MD    Date of Service: 4/17/2025    Chief Complaint:   Chief Complaint   Patient presents with    Follow-up     3 month ov       Interim History:   Mrs. Urias is a 89-year-old female history of TIA, symptomatic palpitations, migraines, mild hyperlipidemia, hypothyroidism, history of breast cancer status post the mastectomy and reconstructive surgery is here for follow-up visit. She was seen in the office on 1/4/24. Since her last visit, she was evaluated in the emergency room on 12/13/2024 with abdominal pain and was diagnosed with constipation and was discharged home.  She was hospitalized from 12/19/2024 to 12/21/2020 for with the chest pain and underwent Lexiscan nuclear stress test which showed no ischemia.  Patient also had hypertensive urgency and was initiated on Imdur and hydrochlorothiazide.  Recently, she noted to have blood pressure running high in the 190s.  On the 15th, patient was recommended to discontinue metoprolol and start on carvedilol 12.5 mg p.o. twice daily.        She was seen in the office on 1/17/2025, since last visit, she has not any further hospitalizations or ER visits.  Still having wide fluctuations with her blood pressure ranging from 190 to 90.  She had a 1 reading or 190 but she did not recheck.  She also had a blood pressure dropping to 90 but she felt weak and tired did not recheck it again.  She usually drinks a good amount of water at least 48 to 64 ounces.  follows a low-salt diet      In 2/2024, she underwent left knee arthroplasty without perioperative cardiac events except for blood pressure issues.  She had an ER visit on 4/13/23 with lower abdominal pain and was diagnosed with cholelithiasis and was discharged home and no further episodes.  She is here for follow up visit and also for pre-op evaluation prior to left knee arthroplasty which is

## 2025-04-23 DIAGNOSIS — D47.2 MONOCLONAL GAMMOPATHY: ICD-10-CM

## 2025-04-23 LAB
ALBUMIN: 4 G/DL (ref 3.5–5.2)
ALP BLD-CCNC: 105 U/L (ref 35–104)
ALT SERPL-CCNC: 13 U/L (ref 0–35)
ANION GAP SERPL CALCULATED.3IONS-SCNC: 10 MMOL/L (ref 7–16)
AST SERPL-CCNC: 21 U/L (ref 0–35)
BASOPHILS ABSOLUTE: 0.03 K/UL (ref 0–0.2)
BASOPHILS RELATIVE PERCENT: 1 % (ref 0–2)
BETA-2 MICROGLOBULIN: 3.4 MG/L (ref 0–3)
BILIRUB SERPL-MCNC: 0.3 MG/DL (ref 0–1.2)
BUN BLDV-MCNC: 16 MG/DL (ref 8–23)
CALCIUM SERPL-MCNC: 9.6 MG/DL (ref 8.8–10.2)
CHLORIDE BLD-SCNC: 97 MMOL/L (ref 98–107)
CO2: 26 MMOL/L (ref 22–29)
CREAT SERPL-MCNC: 1 MG/DL (ref 0.5–1)
EOSINOPHILS ABSOLUTE: 0.14 K/UL (ref 0.05–0.5)
EOSINOPHILS RELATIVE PERCENT: 2 % (ref 0–6)
FREE KAPPA/LAMBDA RATIO: 1.51 (ref 0.22–1.74)
GFR, ESTIMATED: 57 ML/MIN/1.73M2
GLUCOSE BLD-MCNC: 106 MG/DL (ref 74–99)
HCT VFR BLD CALC: 37.1 % (ref 34–48)
HEMOGLOBIN: 12.3 G/DL (ref 11.5–15.5)
IGA: 106 MG/DL (ref 70–400)
IGG: 1051 MG/DL (ref 700–1600)
IGM: 93 MG/DL (ref 40–230)
IMMATURE GRANULOCYTES %: 0 % (ref 0–5)
IMMATURE GRANULOCYTES ABSOLUTE: <0.03 K/UL (ref 0–0.58)
KAPPA FREE LIGHT CHAINS QNT: 37.6 MG/L
LACTATE DEHYDROGENASE: 146 U/L (ref 135–214)
LAMBDA FREE LIGHT CHAINS QNT: 24.9 MG/L (ref 4.2–27.7)
LYMPHOCYTES ABSOLUTE: 1.74 K/UL (ref 1.5–4)
LYMPHOCYTES RELATIVE PERCENT: 29 % (ref 20–42)
MCH RBC QN AUTO: 30.9 PG (ref 26–35)
MCHC RBC AUTO-ENTMCNC: 33.2 G/DL (ref 32–34.5)
MCV RBC AUTO: 93.2 FL (ref 80–99.9)
MONOCYTES ABSOLUTE: 0.69 K/UL (ref 0.1–0.95)
MONOCYTES RELATIVE PERCENT: 11 % (ref 2–12)
NEUTROPHILS ABSOLUTE: 3.44 K/UL (ref 1.8–7.3)
NEUTROPHILS RELATIVE PERCENT: 57 % (ref 43–80)
PDW BLD-RTO: 13.3 % (ref 11.5–15)
PLATELET # BLD: 236 K/UL (ref 130–450)
PMV BLD AUTO: 10.6 FL (ref 7–12)
POTASSIUM SERPL-SCNC: 4.1 MMOL/L (ref 3.4–4.5)
RBC # BLD: 3.98 M/UL (ref 3.5–5.5)
SODIUM BLD-SCNC: 133 MMOL/L (ref 136–145)
TOTAL PROTEIN: 6.8 G/DL (ref 6.4–8.3)
WBC # BLD: 6.1 K/UL (ref 4.5–11.5)

## 2025-04-28 LAB
ALBUMIN (CALCULATED): 3.3 G/DL (ref 3.5–4.7)
ALPHA-1-GLOBULIN: 0.3 G/DL (ref 0.2–0.4)
ALPHA-2-GLOBULIN: 1 G/DL (ref 0.5–1)
BETA GLOBULIN: 0.9 G/DL (ref 0.8–1.3)
GAMMA GLOBULIN: 1.1 G/DL (ref 0.7–1.6)
M SPIKE 1 SERUM PROTEIN ELEC: 0.6 G/DL
PATHOLOGIST REVIEW: NORMAL
PATHOLOGIST: ABNORMAL
PROTEIN ELECTROPHORESIS, SERUM: ABNORMAL
SERUM IFX INTERP: NORMAL
TOTAL PROTEIN: 6.5 G/DL (ref 6.4–8.3)

## 2025-04-29 ENCOUNTER — OFFICE VISIT (OUTPATIENT)
Dept: PAIN MANAGEMENT | Age: 89
End: 2025-04-29
Payer: MEDICARE

## 2025-04-29 VITALS
TEMPERATURE: 96.9 F | OXYGEN SATURATION: 97 % | RESPIRATION RATE: 18 BRPM | BODY MASS INDEX: 29.49 KG/M2 | HEART RATE: 66 BPM | DIASTOLIC BLOOD PRESSURE: 58 MMHG | WEIGHT: 177 LBS | HEIGHT: 65 IN | SYSTOLIC BLOOD PRESSURE: 122 MMHG

## 2025-04-29 DIAGNOSIS — G89.4 CHRONIC PAIN SYNDROME: Primary | ICD-10-CM

## 2025-04-29 DIAGNOSIS — M47.816 LUMBAR SPONDYLOSIS: ICD-10-CM

## 2025-04-29 DIAGNOSIS — M79.2 NEURALGIA AND NEURITIS: ICD-10-CM

## 2025-04-29 DIAGNOSIS — M48.061 SPINAL STENOSIS OF LUMBAR REGION WITHOUT NEUROGENIC CLAUDICATION: ICD-10-CM

## 2025-04-29 DIAGNOSIS — M47.816 LUMBAR FACET ARTHROPATHY: ICD-10-CM

## 2025-04-29 PROCEDURE — 1160F RVW MEDS BY RX/DR IN RCRD: CPT | Performed by: PAIN MEDICINE

## 2025-04-29 PROCEDURE — 99213 OFFICE O/P EST LOW 20 MIN: CPT | Performed by: PAIN MEDICINE

## 2025-04-29 PROCEDURE — 1123F ACP DISCUSS/DSCN MKR DOCD: CPT | Performed by: PAIN MEDICINE

## 2025-04-29 PROCEDURE — 1159F MED LIST DOCD IN RCRD: CPT | Performed by: PAIN MEDICINE

## 2025-04-29 RX ORDER — GABAPENTIN 300 MG/1
300 CAPSULE ORAL 3 TIMES DAILY
Qty: 270 CAPSULE | Refills: 1 | Status: SHIPPED | OUTPATIENT
Start: 2025-04-29 | End: 2025-10-26

## 2025-04-29 RX ORDER — GABAPENTIN 300 MG/1
300 CAPSULE ORAL 3 TIMES DAILY
Qty: 270 CAPSULE | Refills: 1 | Status: SHIPPED | OUTPATIENT
Start: 2025-04-29 | End: 2025-04-29 | Stop reason: ALTCHOICE

## 2025-04-29 NOTE — PROGRESS NOTES
[facility]  [address], [place] [address]  [phone]    Follow up Note      Ama Urias     Date of Visit:  4/29/2025    CC:  Patient presents for follow up   Chief Complaint   Patient presents with    Back Pain     HPI:  Follow up on her low back pain with no acute issues.  Appropriate analgesia with current medications regimen: yes - .    Change in quality of symptoms:none  Medication side effects:none.   Recent diagnostic testing:none.   Recent interventional procedures:none    She has not been on anticoagulation medications to include none. The patient  has not been on herbal supplements.  The patient is not diabetic.     Imaging:   Lumbar spine MRI   Multilevel lumbar spondylosis most pronounced at L3-4, there is diffuse disc bulge with facet hypertrophy resulting in moderate central canal and bilateral foraminal narrowing.  Mild right foraminal narrowing at L2-3 and mild bilateral foraminal narrowing at L4-5 and L5-S1.     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     The study also reveals evidence of chronic neurogenic changes in the right S1 myotome indicative of a right chronic S1 radiculopathy.     Previous treatments: Physical Therapy and medications..         Potential Aberrant Drug-Related Behavior:  No      Urine Drug Screening:  None    OARRS report:  04/2025 consistent    Opioid Agreement:  Renewal date:N/A    Past Medical History:   Diagnosis Date    Abdominal pain     Arthritis     l knee injections with dr hall    Chest pain     resolved    Difficult intubation 1-    document on chart    Hypertension     Hypothyroidism     Low back pain     Migraine headache     Neuropathy     sensory in feet

## 2025-04-29 NOTE — PROGRESS NOTES
Ama Urias presents to the [facility] on 4/29/2025. Ama is complaining of pain LOWER BACK. The pain is persistent. The pain is described as penetrating. Pain is rated on her best day at a 1, on her worst day at a 10, and on average at a 5 on the VAS scale. She took her last dose of Neurontin and Tylenol Tylenol on occasion.      Any procedures since your last visit: No  She is not on NSAIDS and  is not on anticoagulation medications   Pacemaker or defibrillator: No     Do you want someone present when the provider examines you? No    Medication Contract and Consent for Opioid Use Documents Filed        No documents found                       Resp 18   Ht 1.651 m (5' 5\")   Wt 80.3 kg (177 lb)   BMI 29.45 kg/m²      No LMP recorded. Patient is postmenopausal.

## 2025-04-30 ENCOUNTER — OFFICE VISIT (OUTPATIENT)
Dept: ONCOLOGY | Age: 89
End: 2025-04-30
Payer: MEDICARE

## 2025-04-30 ENCOUNTER — HOSPITAL ENCOUNTER (OUTPATIENT)
Dept: INFUSION THERAPY | Age: 89
Discharge: HOME | End: 2025-04-30

## 2025-04-30 VITALS
TEMPERATURE: 97.4 F | BODY MASS INDEX: 29.56 KG/M2 | DIASTOLIC BLOOD PRESSURE: 58 MMHG | OXYGEN SATURATION: 100 % | HEIGHT: 65 IN | WEIGHT: 177.4 LBS | SYSTOLIC BLOOD PRESSURE: 152 MMHG | HEART RATE: 74 BPM

## 2025-04-30 DIAGNOSIS — D47.2 MONOCLONAL GAMMOPATHY: Primary | ICD-10-CM

## 2025-04-30 PROCEDURE — 1123F ACP DISCUSS/DSCN MKR DOCD: CPT | Performed by: INTERNAL MEDICINE

## 2025-04-30 PROCEDURE — 1159F MED LIST DOCD IN RCRD: CPT | Performed by: INTERNAL MEDICINE

## 2025-04-30 PROCEDURE — 1160F RVW MEDS BY RX/DR IN RCRD: CPT | Performed by: INTERNAL MEDICINE

## 2025-04-30 PROCEDURE — 99213 OFFICE O/P EST LOW 20 MIN: CPT

## 2025-04-30 PROCEDURE — 99214 OFFICE O/P EST MOD 30 MIN: CPT | Performed by: INTERNAL MEDICINE

## 2025-04-30 PROCEDURE — 1126F AMNT PAIN NOTED NONE PRSNT: CPT | Performed by: INTERNAL MEDICINE

## 2025-04-30 NOTE — PROGRESS NOTES
Patient refused AVS, Has My Chart. Transferring care to [facility].  
      Physical Exam:  BP (!) 152/58   Pulse 74   Temp 97.4 °F (36.3 °C)   Ht 1.651 m (5' 5\")   Wt 80.5 kg (177 lb 6.4 oz)   SpO2 100%   BMI 29.52 kg/m²     GENERAL: Alert, oriented x 3, not in acute distress.  HEENT: PERRLA; EOMI. Oropharynx clear.   NECK: Supple. No palpable cervical or supraclavicular lymphadenopathy.   LUNGS: Good air entry bilaterally. No wheezing, crackles or rhonchi.   CARDIOVASCULAR: Regular rate. No murmurs, rubs or gallops.   ABDOMEN: Soft. Non-tender, non-distended. Positive bowel sounds.  EXTREMITIES: Without clubbing, cyanosis, or edema. Gets inj into left knee for OA.   NEUROLOGIC: No focal deficits. Currently no lower back pain or hip pain.   ECOG PS 1    Impression/Plan:        The patient is a very pleasant 89-year-old lady, with a past medical history significant for hypertension, hypothyroidism, migraine headache, she is a retired RN, she was seen by neurology for peripheral neuropathy, she had a work-up done, including SPEP with BERE, revealing minor/faint band/minute amount of IgG kappa monoclonal protein, M spike 0.4G/DL.  IgA 118, IgG 779, IgM 106.     Patient with IgG kappa monoclonal gammopathy, the patient does not have anemia, hypercalcemia or kidney dysfunction, a work-up was ordered, the M spike is small, 0.3G/DL, IgA 131, IgG 907, IgM 114.  Kappa one 8.76, lambda one 2.34, kappa to lambda ratio 1.52, beta-2 microglobulin is 2.2, a skeletal survey was done on 3/30/2021, revealing osteopenia, no focal lytic lesions, small sclerotic lesion in the right iliac bone, likely a bone island.  The results were reviewed with the patient.  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  Labs reviewed, hemoglobin is normal at 12.6, calcium is normal, creatinine 1, SPEP with immunofixation had revealed an IgG kappa monoclonal protein, M spike 0.6G/DL,

## 2025-05-14 ENCOUNTER — HOSPITAL ENCOUNTER (OUTPATIENT)
Dept: GENERAL RADIOLOGY | Age: 89
Discharge: HOME | End: 2025-05-16
Payer: MEDICARE

## 2025-05-14 DIAGNOSIS — D47.2 MONOCLONAL GAMMOPATHY: ICD-10-CM

## 2025-05-14 PROCEDURE — 77075 RADEX OSSEOUS SURVEY COMPL: CPT

## 2025-07-22 ENCOUNTER — TRANSCRIBE ORDERS (OUTPATIENT)
Dept: ADMINISTRATIVE | Age: 89
End: 2025-07-22

## 2025-07-22 DIAGNOSIS — M54.42 BILATERAL LOW BACK PAIN WITH BILATERAL SCIATICA, UNSPECIFIED CHRONICITY: Primary | ICD-10-CM

## 2025-07-22 DIAGNOSIS — M54.41 BILATERAL LOW BACK PAIN WITH BILATERAL SCIATICA, UNSPECIFIED CHRONICITY: Primary | ICD-10-CM

## 2025-08-13 ENCOUNTER — HOSPITAL ENCOUNTER (OUTPATIENT)
Dept: GENERAL RADIOLOGY | Age: 89
Discharge: HOME | End: 2025-08-15
Payer: MEDICARE

## 2025-08-13 ENCOUNTER — HOSPITAL ENCOUNTER (OUTPATIENT)
Dept: MRI IMAGING | Age: 89
Discharge: HOME | End: 2025-08-15
Payer: MEDICARE

## 2025-08-13 DIAGNOSIS — M54.42 BILATERAL LOW BACK PAIN WITH BILATERAL SCIATICA, UNSPECIFIED CHRONICITY: ICD-10-CM

## 2025-08-13 DIAGNOSIS — R52 PAIN: ICD-10-CM

## 2025-08-13 DIAGNOSIS — M54.41 BILATERAL LOW BACK PAIN WITH BILATERAL SCIATICA, UNSPECIFIED CHRONICITY: ICD-10-CM

## 2025-08-13 PROCEDURE — 73502 X-RAY EXAM HIP UNI 2-3 VIEWS: CPT

## 2025-08-13 PROCEDURE — 72148 MRI LUMBAR SPINE W/O DYE: CPT

## 2025-09-03 ENCOUNTER — TELEPHONE (OUTPATIENT)
Age: 89
End: 2025-09-03

## (undated) DEVICE — GLOVE SURG SZ 75 STD WHT LTX SYN POLYMER BEAD REINF ANTI RL

## (undated) DEVICE — SET SURG INSTR MINI VASC

## (undated) DEVICE — SYRINGE MED 10ML LUERLOCK TIP W/O SFTY DISP

## (undated) DEVICE — LABEL MED 4 IN SURG PANEL W/ PEN STRL

## (undated) DEVICE — SET EXTN L14IN 1ML IV L BOR NO FLTR NO STPCOCK

## (undated) DEVICE — PACK,UNIV, II AURORA: Brand: MEDLINE

## (undated) DEVICE — 3M™ STERI-DRAPE™ ISOLATION BAG, 10 PER CARTON / 4 CARTONS PER CASE, 1003: Brand: 3M™ STERI-DRAPE™

## (undated) DEVICE — NEEDLE HYPO 18GA L1.5IN PNK POLYPR HUB S STL THN WALL FILL

## (undated) DEVICE — SURGICAL PROCEDURE PACK BASIC

## (undated) DEVICE — SOLUTION IV 500ML 0.9% SOD CHL INJ USP CONT EXCEL

## (undated) DEVICE — CHLORAPREP 26ML ORANGE

## (undated) DEVICE — GLOVE ORANGE PI 7 1/2   MSG9075

## (undated) DEVICE — TOWEL,OR,DSP,ST,BLUE,STD,6/PK,12PK/CS: Brand: MEDLINE

## (undated) DEVICE — STERILE HOOK LOCK ELASTIC BANDAGE 6IN X 15 YARD: Brand: HOOK LOCK™

## (undated) DEVICE — SOLUTION IV IRRIG WATER 1000ML POUR BRL 2F7114

## (undated) DEVICE — GAUZE,SPONGE,4"X4",12PLY,STERILE,LF,2'S: Brand: MEDLINE

## (undated) DEVICE — INTENDED FOR TISSUE SEPARATION, AND OTHER PROCEDURES THAT REQUIRE A SHARP SURGICAL BLADE TO PUNCTURE OR CUT.: Brand: BARD-PARKER ® STAINLESS STEEL BLADES

## (undated) DEVICE — BANDAGE,GAUZE,4.5"X4.1YD,STERILE,LF: Brand: MEDLINE

## (undated) DEVICE — STRIP,CLOSURE,WOUND,MEDI-STRIP,1/2X4: Brand: MEDLINE

## (undated) DEVICE — TRAY EPI SGL DOSE 18GA NDL CUST AULTMAN HOSP

## (undated) DEVICE — APPLICATOR MEDICATED 10.5 CC SOLUTION HI LT ORNG CHLORAPREP

## (undated) DEVICE — SWABSTICK SURG PREP BENZOIN TINCTURE SINGLE ST

## (undated) DEVICE — 3M™ RED DOT™ MONITORING ELECTRODE WITH FOAM TAPE AND STICKY GEL 2560, 50/BAG, 20/CASE, 72/PLT: Brand: RED DOT™

## (undated) DEVICE — 12 ML SYRINGE,LUER-LOCK TIP: Brand: MONOJECT

## (undated) DEVICE — Device

## (undated) DEVICE — 6 ML SYRINGE LUER-LOCK TIP: Brand: MONOJECT

## (undated) DEVICE — GLOVE SURG L12IN SZ 65FNGR THK94MIL TRNSLUC YEL LTX

## (undated) DEVICE — INTRODUCER SHTH 7FR L11CM CLSR FAST SET

## (undated) DEVICE — NEEDLE HYPO 27GA L1.25IN GRY POLYPR HUB S STL REG BVL STR

## (undated) DEVICE — BANDAGE ADH W1XL3IN NAT FAB WVN FLX DURABLE N ADH PD SEAL

## (undated) DEVICE — GLOVE ORANGE PI 8   MSG9080

## (undated) DEVICE — STANDARD HYPODERMIC NEEDLE,POLYPROPYLENE HUB: Brand: MONOJECT

## (undated) DEVICE — GAUZE,SPONGE,AVANT,4"X4",4PLY,STRL,10/TR: Brand: MEDLINE

## (undated) DEVICE — GAUZE,SPONGE,4"X4",16PLY,XRAY,STRL,LF: Brand: MEDLINE

## (undated) DEVICE — 3 ML SYRINGE LUER-LOCK TIP: Brand: MONOJECT

## (undated) DEVICE — SYRINGE MED 10ML POLYPR LUERSLIP TIP FLAT TOP W/O SFTY DISP

## (undated) DEVICE — 18 GA N.G. KIT, 10 PACK: Brand: SITE-RITE

## (undated) DEVICE — SET INFUS PMP TBNG L4M TUMESCENT VAR CTRL FT PEDAL

## (undated) DEVICE — GOWN,AURORA,NONREINF,RAGLAN,L,STERILE: Brand: MEDLINE

## (undated) DEVICE — GEL US 20GM NONIRRITATING OVERWRAPPED FILE PCH TRNSMIT

## (undated) DEVICE — SOLUTION IV 100ML 0.9% SOD CHL PLAS CONT USP VIAFLX 1 PER

## (undated) DEVICE — GLOVE SURG SZ 75 L12IN FNGR THK94MIL TRNSLUC YEL LTX

## (undated) DEVICE — ENCORE® LATEX MICRO SIZE 7.5, STERILE LATEX POWDER-FREE SURGICAL GLOVE: Brand: ENCORE